# Patient Record
Sex: FEMALE | Race: WHITE | Employment: UNEMPLOYED | URBAN - METROPOLITAN AREA
[De-identification: names, ages, dates, MRNs, and addresses within clinical notes are randomized per-mention and may not be internally consistent; named-entity substitution may affect disease eponyms.]

---

## 2021-12-25 VITALS
HEART RATE: 96 BPM | WEIGHT: 293 LBS | TEMPERATURE: 97.9 F | DIASTOLIC BLOOD PRESSURE: 81 MMHG | SYSTOLIC BLOOD PRESSURE: 103 MMHG | HEIGHT: 62 IN | RESPIRATION RATE: 18 BRPM | BODY MASS INDEX: 53.92 KG/M2 | OXYGEN SATURATION: 96 %

## 2021-12-25 PROCEDURE — 99281 EMR DPT VST MAYX REQ PHY/QHP: CPT

## 2021-12-26 ENCOUNTER — HOSPITAL ENCOUNTER (OUTPATIENT)
Dept: GENERAL RADIOLOGY | Age: 41
Discharge: HOME | End: 2021-12-26
Attending: EMERGENCY MEDICINE

## 2021-12-26 ENCOUNTER — HOSPITAL ENCOUNTER (EMERGENCY)
Age: 41
Discharge: HOME | End: 2021-12-26
Attending: EMERGENCY MEDICINE
Payer: MEDICAID

## 2021-12-26 DIAGNOSIS — R29.6 RECURRENT FALLS: ICD-10-CM

## 2021-12-26 DIAGNOSIS — M25.561 ACUTE PAIN OF RIGHT KNEE: Primary | ICD-10-CM

## 2021-12-26 RX ORDER — TRAMADOL HYDROCHLORIDE 50 MG/1
100 TABLET ORAL
Status: DISCONTINUED | OUTPATIENT
Start: 2021-12-26 | End: 2021-12-26 | Stop reason: HOSPADM

## 2021-12-26 RX ORDER — LIDOCAINE 4 G/100G
1 PATCH TOPICAL
Status: DISCONTINUED | OUTPATIENT
Start: 2021-12-26 | End: 2021-12-26 | Stop reason: HOSPADM

## 2021-12-26 RX ORDER — TRAMADOL HYDROCHLORIDE 50 MG/1
50 TABLET ORAL
Qty: 20 TABLET | Refills: 0 | Status: SHIPPED | OUTPATIENT
Start: 2021-12-26 | End: 2021-12-29

## 2021-12-26 NOTE — ED PROVIDER NOTES
EMERGENCY DEPARTMENT HISTORY AND PHYSICAL EXAM     ------------------------------------------------------------------------------------------------------  Please note that this dictation was completed with M[employer], the [employer] voice recognition software. Quite often unanticipated grammatical, syntax, homophones, and other interpretive errors are inadvertently transcribed by the computer software. Please disregard these errors. Please excuse any errors that have escaped final proofreading.  -----------------------------------------------------------------------------------------------------------------    Date: 12/26/2021  Patient Name: Kyrie Perez    History of Presenting Illness     Chief Complaint   Patient presents with   Preston Fall     Patient Reports Right Knee Pain after a GLR from a Chair. Patient Denies Striking her Head or Use of Blood Thinners. History Provided By: Patient    HPI: Kyrie Perez is a 42 y.o. female, with significant pmhx of previous work-related injury to her left ankle status post repair at [facility], who presents via private vehicle to the ED with c/o right knee pain. Patient reports that she was at her senior mother's house utilizing her wheelchair when she slipped out of the wheelchair with subsequent pain in her right knee. Patient reports that she was attempting to get off the ground and that her knee would not cooperate with her. Patient is due also fell the other day at physical therapy attempting to reach her walker. Has not taking any medication to alleviate her symptoms. Denies having struck her head or loss of consciousness. Notes aching pain along the medial aspect of her right knee. Notes having similar pain in her knee with previous primary care visit suggesting arthritis and physical therapy. Has not followed with orthopedics for her knee pain.   Pt also specifically denies any recent fevers, chills, CP, SOB, nausea, vomiting, diarrhea, abd pain, changes in BM, urinary sxs, or headache. PCP: Titus Rosenberg MD    Social Hx: denies tobacco, denies EtOH, denies recreational/ Illicit Drugs     There are no other complaints, changes, or physical findings at this time. No Known Allergies          Past History     Past Medical History:  No past medical history on file. Past Surgical History:  No past surgical history on file. Family History:  No family history on file. Social History:  Social History     Tobacco Use    Smoking status: Not on file    Smokeless tobacco: Not on file   Substance Use Topics    Alcohol use: Not on file    Drug use: Not on file       Allergies:  No Known Allergies      Review of Systems   Review of Systems   Constitutional: Negative. Negative for fever. Eyes: Negative. Respiratory: Negative. Negative for shortness of breath. Cardiovascular: Negative for chest pain. Gastrointestinal: Negative for abdominal pain, nausea and vomiting. Endocrine: Negative. Genitourinary: Negative. Negative for difficulty urinating, dysuria and hematuria. Musculoskeletal: Positive for arthralgias. Skin: Negative. Neurological: Negative. Psychiatric/Behavioral: Negative for suicidal ideas. All other systems reviewed and are negative. Physical Exam   Physical Exam  Vitals and nursing note reviewed. Constitutional:       General: She is not in acute distress. Appearance: She is well-developed. She is morbidly obese. She is not diaphoretic. HENT:      Head: Normocephalic and atraumatic. Nose: Nose normal.   Eyes:      General: No scleral icterus. Conjunctiva/sclera: Conjunctivae normal.   Neck:      Trachea: No tracheal deviation. Cardiovascular:      Rate and Rhythm: Normal rate and regular rhythm. Heart sounds: Normal heart sounds. No murmur heard. No friction rub. Pulmonary:      Effort: Pulmonary effort is normal. No respiratory distress. Breath sounds: Normal breath sounds.  No stridor. No wheezing or rales. Abdominal:      General: Bowel sounds are normal. There is no distension. Palpations: Abdomen is soft. Tenderness: There is no abdominal tenderness. There is no rebound. Musculoskeletal:         General: Normal range of motion. Cervical back: Normal range of motion. Right knee: Tenderness present over the medial joint line. Legs:    Skin:     General: Skin is warm and dry. Findings: No rash. Neurological:      Mental Status: She is alert and oriented to person, place, and time. Cranial Nerves: No cranial nerve deficit. Psychiatric:         Speech: Speech normal.         Behavior: Behavior normal.         Thought Content: Thought content normal.         Judgment: Judgment normal.           Diagnostic Study Results     Labs -   No results found for this or any previous visit (from the past 12 hour(s)). Radiologic Studies -   XR KNEE RT 3 V   Final Result   No acute fracture or dislocation. CT Results  (Last 48 hours)    None        CXR Results  (Last 48 hours)    None            Medical Decision Making   I am the first provider for this patient. I reviewed the vital signs, available nursing notes, past medical history, past surgical history, family history and social history. Vital Signs-Reviewed the patient's vital signs. Patient Vitals for the past 12 hrs:   Temp Pulse Resp BP SpO2   12/25/21 2352 97.9 °F (36.6 °C) 96 18 103/81 96 %       Pulse Oximetry Analysis - 96% on RA Normal    Records Reviewed/Interpretted: Nursing Notes from triage and Old Medical Records, noting previous primary care and physical therapy visits    Provider Notes (Medical Decision Making):     DDX:  Contusion, fracture    Plan:  X-ray, analgesic    Impression:  Acute knee pain    ED Course:   Initial assessment performed.  The patients presenting problems have been discussed, and they are in agreement with the care plan formulated and outlined with them.  I have encouraged them to ask questions as they arise throughout their visit. I reviewed our electronic medical record system for any past medical records that were available that may contribute to the patients current condition, the nursing notes and and vital signs from today's visit  Nursing notes will be reviewed as they become available in realtime while the pt has been in the ED. Terra Downs MD    I personally reviewed/interpreted pt's imaging. Agree with official read by radiology as noted above. Terra Downs MD      2:39 AM  Progress note:  Pt noted to be feeling better, ready for discharge. Discussed lab and imaging findings with pt, specifically noting no evidence of fracture. Pt will follow up with primary care as instructed. All questions have been answered, pt voiced understanding and agreement with plan. Specific return precautions provided in addition to instructions for pt to return to the ED immediately should sx worsen at any time. Terra Downs MD             Critical Care Time:     none      Diagnosis     Clinical Impression:   1. Acute pain of right knee    2. Recurrent falls        PLAN:  1. Current Discharge Medication List      START taking these medications    Details   traMADoL (Ultram) 50 mg tablet Take 1 Tablet by mouth every six (6) hours as needed for Pain for up to 3 days. Max Daily Amount: 200 mg. Qty: 20 Tablet, Refills: 0  Start date: 12/26/2021, End date: 12/29/2021    Associated Diagnoses: Acute pain of right knee           2.    Follow-up Information     Follow up With Specialties Details Why Contact Info    Zoila Bird MD [facility] Medicine Schedule an appointment as soon as possible for a visit in 2 days  [facility] & [address]      Alexandr Sanz MD Orthopedic Surgery Schedule an appointment as soon as possible for a visit in 2 days  [address]  [address] [phone]  [address] [facility]  Schedule an appointment as soon as possible for a visit in 2 days  [address] [phone]  [phone]        Return to ED if worse     Disposition:    2:39 AM   The patient's results have been reviewed with family and/or caregiver. They verbally convey their understanding and agreement of the patient's signs, symptoms, diagnosis, treatment and prognosis and additionally agree to follow up as recommended in the discharge instructions or to return to the Emergency Room should the patient's condition change prior to their follow-up appointment. The family and/or caregiver verbally agrees with the care-plan and all of their questions have been answered. The discharge instructions have also been provided to the them with educational information regarding the patient's diagnosis as well a list of reasons why the patient would want to return to the ER prior to their follow-up appointment should their condition change.   North Salcedo MD

## 2022-01-25 ENCOUNTER — HOSPITAL ENCOUNTER (EMERGENCY)
Age: 42
Discharge: HOME | End: 2022-01-25
Attending: EMERGENCY MEDICINE
Payer: MEDICAID

## 2022-01-25 ENCOUNTER — APPOINTMENT (OUTPATIENT)
Dept: GENERAL RADIOLOGY | Age: 42
End: 2022-01-25
Attending: EMERGENCY MEDICINE
Payer: MEDICAID

## 2022-01-25 VITALS
BODY MASS INDEX: 51.74 KG/M2 | RESPIRATION RATE: 18 BRPM | TEMPERATURE: 98.9 F | OXYGEN SATURATION: 96 % | WEIGHT: 292 LBS | DIASTOLIC BLOOD PRESSURE: 64 MMHG | HEART RATE: 110 BPM | HEIGHT: 63 IN | SYSTOLIC BLOOD PRESSURE: 136 MMHG

## 2022-01-25 DIAGNOSIS — M25.561 CHRONIC PAIN OF RIGHT KNEE: Primary | ICD-10-CM

## 2022-01-25 DIAGNOSIS — G89.29 CHRONIC PAIN OF RIGHT KNEE: Primary | ICD-10-CM

## 2022-01-25 DIAGNOSIS — S80.01XA CONTUSION OF RIGHT KNEE, INITIAL ENCOUNTER: ICD-10-CM

## 2022-01-25 DIAGNOSIS — E66.01 MORBID OBESITY WITH BMI OF 50.0-59.9, ADULT (HCC): ICD-10-CM

## 2022-01-25 PROCEDURE — 73562 X-RAY EXAM OF KNEE 3: CPT

## 2022-01-25 PROCEDURE — 74011250637 HC RX REV CODE- 250/637: Performed by: EMERGENCY MEDICINE

## 2022-01-25 PROCEDURE — 99284 EMERGENCY DEPT VISIT MOD MDM: CPT

## 2022-01-25 RX ORDER — SULFAMETHOXAZOLE AND TRIMETHOPRIM 800; 160 MG/1; MG/1
1 TABLET ORAL 2 TIMES DAILY
COMMUNITY
End: 2022-01-25

## 2022-01-25 RX ORDER — IBUPROFEN 600 MG/1
600 TABLET ORAL
COMMUNITY
End: 2022-01-25 | Stop reason: SDUPTHER

## 2022-01-25 RX ORDER — OXYCODONE AND ACETAMINOPHEN 5; 325 MG/1; MG/1
2 TABLET ORAL
Status: COMPLETED | OUTPATIENT
Start: 2022-01-25 | End: 2022-01-25

## 2022-01-25 RX ORDER — IBUPROFEN 600 MG/1
600 TABLET ORAL
Qty: 20 TABLET | Refills: 0 | Status: SHIPPED | OUTPATIENT
Start: 2022-01-25 | End: 2022-02-13

## 2022-01-25 RX ADMIN — OXYCODONE HYDROCHLORIDE AND ACETAMINOPHEN 2 TABLET: 5; 325 TABLET ORAL at 17:15

## 2022-01-25 NOTE — PROGRESS NOTES
Transition of Care Plan:     * Disposition- Home with [place]  * Transportation at Discharge: [facility] with Ortho @ [place] 2/14/22  * DME needed: has walker at home     1600 CM receive consult patient lives alone at home patient took 2 to assist concern ADLs. 1620 CM reviewed chart. CM completed assessment with pt at bedside. Pt is alert and oriented throughout encounter. CM introduced self/role, verified demographics, and discussed discharge planning. Per patient her home phone does not work anymore. She is somewhat IADLS Her mother Linda Klein (73y/o) assist with something's. She walks around with a walker and gets in the shower with she can its difficult for her to get in the tub. She states \" I need help at home my mother can't lyft me and I can barely move around I just need a few days\" per patient she her PCP tried outpatient PT with [facility] she went for 2 days and she fell while there cause the pt staff had her walker far when she reach for it she fell she stated she was at [facility] for approximate 28 days did one hour of therapy and that was enough for her. Her goal with therapy is to get up walking and do things for herself cause her mother can't do it all. She has a sister she has not spoken to too since her car engine blew and patient has a boyfriend Juliet Lebron [phone]. CM discuss rules/regulations/admission criteria for medicaid for Inpatient Rehab, outpatient rehab, Home health, and SNF. Inform patient if she wants placement it will not take place today but facility are able to place from the community.      Discuss assisted living as her mother is getting of age and she needs help she states \" well I'm 42 and I don't want to go to a nursing home\" patient reference multiple times to having conversation with her  in reference to a prior injury and because of that patient neglects her health AEB: her knees has been hurting for the past several months close to a year she states \"I called my  and he said if I fell again then call the doctor, and I if can prove that my ankle that still has a screw in there is the reason for my arthritis and why my knees gave out then I can call my doctor and schedule an appointment\"     Discuss [facility], [facility] CM sent to [facility] agency via [phone] patient upset that she is not able to stay in the hospital and states \" I tried to explain to the doctor I have 4 stairs to get up to get in the house\" CM explain to patient the transportation agency will get patient in the home and will even place her wherever she like in the home, patient was okay with that.  discuss [facility] services coming to the home tomorrow and the SNF referral sent per her request.    1730 Cm called [facility] ETA 1830.   CM will follow-up with patient in the morning in reference to [facility] and SNF referral.     Jimenez Fuller CM

## 2022-01-25 NOTE — DISCHARGE INSTRUCTIONS
Follow up with outpatient physical therapy. You may benefit from short term physical rehabilitation, but you will also benefit from walking, weight loss, and taking extra strength ibuprofen for pain.

## 2022-01-25 NOTE — ED NOTES
Discharge instructions were given to the patient by Jason Askew  . The patient left the Emergency Department, alert and oriented and in no acute distress with 1 prescription. The patient was encouraged to call or return to the ED for worsening issues or problems and was encouraged to schedule a follow up appointment for continuing care. The patient verbalized understanding of discharge instructions and prescriptions, all questions were answered. The patient has no further concerns at this time.

## 2022-01-25 NOTE — ED NOTES
Pt was evaluated by MD. Pt reports she was trying to get up from the couch and fell in an awkward position. Pt noted to be making statements like \"I can't go home. \" And when MD mentioned that pt might not need to be admitted. Pt states \"I need to go somewhere that can help me. That is what medicare is for. \"

## 2022-01-25 NOTE — ED PROVIDER NOTES
EMERGENCY DEPARTMENT HISTORY AND PHYSICAL EXAM      Date: 1/25/2022  Patient Name: Constantin Escalante  Patient Age and Sex: 42 y.o. female  MRN:  [id number]  CSN:  [id number]    History of Presenting Illness     Chief Complaint   Patient presents with    Knee Pain       History Provided By: Patient    Ability to gather history was limited by:     HPI: Constantin Escalante, 42 y.o. female with history of morbid obesity, chronic knee pain, complains of pain in her right knee. Reports that about 30 to 60 minutes ago she fell off her sofa, landed on her right knee and in an awkward position on the floor, could not get herself back up. Her mother called EMS for assistance. She complains of 4 out of 10 severity throbbing pain in the right knee. No other injuries    Location:    Quality:      Severity:    Duration:   Timing:      Context:    Modifying factors:   Associated symptoms:     Past History      The patient's medical, surgical, and social history on file were reviewed by me today.  The family history was reviewed by me today and was non-contributory, unless otherwise specified below:    Past Medical History:  History reviewed. No pertinent past medical history. Past Surgical History:  History reviewed. No pertinent surgical history. Family History:  History reviewed. No pertinent family history. Social History:  Social History     Tobacco Use    Smoking status: Current Every Day Smoker     Packs/day: 0.25     Years: 22.00     Pack years: 5.50    Smokeless tobacco: Never Used   Vaping Use    Vaping Use: Never used   Substance Use Topics    Alcohol use: Yes    Drug use: Not Currently     Types: Marijuana     Comment: last dose was 9 years ago       Current Medications:  No current facility-administered medications on file prior to encounter.      Current Outpatient Medications on File Prior to Encounter   Medication Sig Dispense Refill    [DISCONTINUED] ibuprofen (MOTRIN) 600 mg tablet Take 600 mg by mouth every six (6) hours as needed for Pain.  [DISCONTINUED] trimethoprim-sulfamethoxazole (Bactrim DS) 160-800 mg per tablet Take 1 Tablet by mouth two (2) times a day. Allergies:  No Known Allergies  Review of Systems    A complete ROS was reviewed by me today and was negative, unless otherwise specified below:    Review of Systems   Neurological: Negative for syncope and headaches. All other systems reviewed and are negative. Physical Exam   Vital Signs  Patient Vitals for the past 8 hrs:   Temp Pulse Resp BP SpO2   01/25/22 1610     96 %   01/25/22 1552 98.9 °F (37.2 °C) (!) 110 18 136/64 96 %          Physical Exam  Vitals and nursing note reviewed. Constitutional:       General: She is not in acute distress. Appearance: Normal appearance. She is well-developed. She is obese. She is not ill-appearing. HENT:      Head: Normocephalic and atraumatic. Mouth/Throat:      Mouth: Mucous membranes are moist.   Eyes:      General:         Right eye: No discharge. Left eye: No discharge. Conjunctiva/sclera: Conjunctivae normal.   Cardiovascular:      Rate and Rhythm: Normal rate and regular rhythm. Heart sounds: Normal heart sounds. No murmur heard. Pulmonary:      Effort: Pulmonary effort is normal. No respiratory distress. Breath sounds: Normal breath sounds. No wheezing. Abdominal:      General: There is no distension. Palpations: Abdomen is soft. Tenderness: There is no abdominal tenderness. Musculoskeletal:         General: No deformity. Cervical back: Normal range of motion and neck supple. Right hip: Normal.      Right upper leg: Normal.      Right knee: No swelling, deformity, effusion, ecchymosis or bony tenderness. Decreased range of motion. Tenderness present. Normal alignment. Comments: Allodynia to light palpation over the soft tissues around the knee, yelling out in pain to very light palpation.   Otherwise completely normal examination. No swelling, no deformity, no effusion, no skin changes. Skin:     General: Skin is warm and dry. Findings: No bruising, ecchymosis or rash. Neurological:      General: No focal deficit present. Mental Status: She is alert and oriented to person, place, and time. Psychiatric:         Mood and Affect: Affect is angry. Speech: Speech normal.         Behavior: Behavior normal.         Cognition and Memory: Cognition normal.      Comments: Angry. Very unpleasant affect. Diagnostic Study Results   Labs  No results found for this or any previous visit (from the past 24 hour(s)). Radiologic Studies  XR KNEE RT 3 V   Final Result   Limited radiographic examination of the right knee. No definite   abnormalities detected. No definite evidence of acute traumatic injury. CT Results  (Last 48 hours)    None        CXR Results  (Last 48 hours)    None          Billable Procedures   Procedures    Medical Decision Making     I reviewed the patient's most recent Emergency Dept notes and diagnostic tests in formulating my MDM on today's visit. Provider Notes (Medical Decision Making):   79-year-old female complaining of right knee pain after she fell off of her sofa an hour ago. Pain is acute on chronic. She has been seen in the emergency department before for pain in the same knee. Reassuring vital signs and physical examination. Morbidly obese. The knee appears normal, without any significant deformity or swelling. She screams out in pain to the lightest of touch over the soft tissues around the knee. The exam is not suggestive of acute fracture or ligamentous injury. Xrays negative for fracture, consistent with my exam.  Recommend ibuprofen and outpatient PT/OT consult, which I arranged with case management. She can continue using her walker for now. Encouraged weight loss.     Mervin Ding MD  4:11 PM  1/25/2022 Consults:    Social History     Tobacco Use    Smoking status: Current Every Day Smoker     Packs/day: 0.25     Years: 22.00     Pack years: 5.50    Smokeless tobacco: Never Used   Vaping Use    Vaping Use: Never used   Substance Use Topics    Alcohol use: Yes    Drug use: Not Currently     Types: Marijuana     Comment: last dose was 9 years ago       Medications Administered during ED course:  Medications   oxyCODONE-acetaminophen (PERCOCET) 5-325 mg per tablet 2 Tablet (2 Tablets Oral Given 1/25/22 1715)          Prescriptions from today's ED visit:  Current Discharge Medication List      CONTINUE these medications which have CHANGED    Details   ibuprofen (MOTRIN) 600 mg tablet Take 1 Tablet by mouth every six (6) hours as needed for Pain. Qty: 20 Tablet, Refills: 0  Start date: 1/25/2022            Diagnosis and Disposition     Disposition:  Discharged    Clinical Impression:   1. Chronic pain of right knee    2. Contusion of right knee, initial encounter    3. Morbid obesity with BMI of 50.0-59.9, adult [facility])        Attestation:  I personally performed the services described in this documentation on this date 1/25/2022 for patient Farrah Lima MD        I was the first provider for this patient on this visit. To the best of my ability I reviewed relevant prior medical records, electrocardiograms, laboratories, and radiologic studies. The patient's presenting problems were discussed, and the patient was in agreement with the care plan formulated and outlined with them. Shar Curry MD    Please note that this dictation was completed with Dragon voice recognition software. Quite often unanticipated grammatical, syntax, homophones, and other interpretive errors are inadvertently transcribed by the computer software. Please disregard these errors and excuse any errors that have escaped final proofreading.

## 2022-01-26 NOTE — ED NOTES
Pt presents to ED via EMS complaining of 5/10 right knee pain. Pt fell off of couch and was found face down by EMS. Pt has history of arthritis. Pt is alert and oriented x 4, RR even and unlabored, skin is warm and dry. Assessment completed and pt updated on plan of care. Call bell in reach. Emergency Department Nursing Plan of Care       The Nursing Plan of Care is developed from the Nursing assessment and Emergency Department Attending provider initial evaluation. The plan of care may be reviewed in the ED Provider note.     The Plan of Care was developed with the following considerations:   Patient / Family readiness to learn indicated by:verbalized understanding  Persons(s) to be included in education: patient  Barriers to Learning/Limitations:No    Signed     Mahsa Vanegas    1/25/2022   7:26 PM

## 2022-01-26 NOTE — ED NOTES
This RN assumes role as preceptor to Rashawn Anderson, Student Nurse. This RN reviews all assessments, charting, medication administration, and skills performed by the preceptee.

## 2022-01-28 ENCOUNTER — TELEPHONE (OUTPATIENT)
Dept: CASE MANAGEMENT | Age: 42
End: 2022-01-28

## 2022-01-28 ENCOUNTER — HOSPITAL ENCOUNTER (INPATIENT)
Age: 42
LOS: 13 days | Discharge: NURSING, REHAB OR LONG-TERM CARE | DRG: 321 | End: 2022-02-13
Attending: EMERGENCY MEDICINE | Admitting: INTERNAL MEDICINE
Payer: MEDICAID

## 2022-01-28 ENCOUNTER — APPOINTMENT (OUTPATIENT)
Dept: GENERAL RADIOLOGY | Age: 42
DRG: 321 | End: 2022-01-28
Attending: EMERGENCY MEDICINE
Payer: MEDICAID

## 2022-01-28 DIAGNOSIS — R26.2 INABILITY TO WALK: ICD-10-CM

## 2022-01-28 DIAGNOSIS — Z98.1 S/P CERVICAL SPINAL FUSION: ICD-10-CM

## 2022-01-28 DIAGNOSIS — R29.6 RECURRENT FALLS: ICD-10-CM

## 2022-01-28 DIAGNOSIS — E66.01 MORBID OBESITY (HCC): ICD-10-CM
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
## 2022-01-28 DIAGNOSIS — Z72.0 TOBACCO ABUSE: ICD-10-CM

## 2022-01-28 DIAGNOSIS — M54.12 CERVICAL RADICULOPATHY: ICD-10-CM

## 2022-01-28 DIAGNOSIS — R26.2 AMBULATORY DYSFUNCTION: ICD-10-CM

## 2022-01-28 DIAGNOSIS — M25.561 ACUTE PAIN OF RIGHT KNEE: ICD-10-CM

## 2022-01-28 PROCEDURE — 99285 EMERGENCY DEPT VISIT HI MDM: CPT

## 2022-01-28 PROCEDURE — 73562 X-RAY EXAM OF KNEE 3: CPT

## 2022-01-28 RX ORDER — KETOROLAC TROMETHAMINE 30 MG/ML
30 INJECTION, SOLUTION INTRAMUSCULAR; INTRAVENOUS
Status: COMPLETED | OUTPATIENT
Start: 2022-01-28 | End: 2022-01-29

## 2022-01-28 RX ORDER — OXYCODONE AND ACETAMINOPHEN 5; 325 MG/1; MG/1
1 TABLET ORAL
Status: COMPLETED | OUTPATIENT
Start: 2022-01-28 | End: 2022-01-29

## 2022-01-28 NOTE — TELEPHONE ENCOUNTER
Late entry for 1/27/2022     Update on Home health services CM sent referral to [facility] home health pending referral.    Mayela Clancy CM    Recipient First Sent On Respond By First Response Received Last Response Received Last [address] [facility])  [phone]    * COVID-19: Positive patients under care,* COVID-19: Willing/Equipped to accept COVID-19 positive patients,* High-Risk Isolation Patients: Willing/Equipped to accept High-Risk Isolation Patients 1/25/2022 5:44 PM (ET)  1/26/2022 4:12 PM (ET)  1/25/2022 11:41 PM (ET)  1/25/2022 11:41 PM (ET)  Response: No, unable to accept patient  Reason: Insufficient Funding  Comments: We do not participate with patient's insurance  Waiting for you    [facility]  [phone]  none  * COVID-19: Positive patients under care,* COVID-19: Willing/Equipped to accept COVID-19 positive patients,* High-Risk Isolation Patients: Willing/Equipped to accept High-Risk Isolation Patients 1/25/2022 5:44 PM (ET)  1/26/2022 4:12 PM (ET)  1/25/2022 5:47 PM (ET)  1/26/2022 3:16 PM (ET)  Response: No, unable to accept patient  Reason: Other (see comments)  Comments: This payer not accepted  Waiting for you    [place] [facility]  [phone]    * COVID-19: Willing/Equipped to accept COVID-19 positive patients,* High-Risk Isolation Patients: Willing/Equipped to accept High-Risk Isolation Patients 1/25/2022 5:44 PM (ET)  1/26/2022 9:43 AM (ET)  1/25/2022 5:46 PM (ET)  1/25/2022 5:46 PM (ET)  Response: No, unable to accept patient  Reason: Other (see comments)  Comments: Thank you for your confidence in trusting us with your patient. We are unable to service this patient at this time. Please contact us with your next referral as things change quickly and we want to ensure we provide the highest clinical excellence for all of our patients.   Waiting for you    FABIO OWEN [facility]  [phone]    * COVID-19: Positive patients under care,* COVID-19: Willing/Equipped to accept COVID-19 positive patients,* High-Risk Isolation Patients: Willing/Equipped to accept High-Risk Isolation Patients 1/25/2022 5:44 PM (ET)  1/26/2022 4:12 PM (ET)  1/25/2022 6:07 PM (ET)  1/26/2022 3:15 PM (ET)  Response: No, unable to accept patient  Reason: Out of Network  Comments: .   Waiting for you    [address] [place] [address] [employer]  [phone]: Positive patients under care,* COVID-19: Willing/Equipped to accept COVID-19 positive patients,* High-Risk Isolation Patients: Willing/Equipped to accept High-Risk Isolation Patients 1/26/2022 3:13 PM (ET)  1/26/2022 4:12 PM (ET)  1/26/2022 3:15 PM (ET)  1/26/2022 3:15 PM (ET)  Response: No, unable to accept patient  Reason: Out of Network  Comments: Unable to accept due to patients insurance being out of network with agency  Waiting for you    [facility]  [phone]: Positive patients under care,* COVID-19: Willing/Equipped to accept COVID-19 positive patients,* High-Risk Isolation Patients: Willing/Equipped to accept High-Risk Isolation Patients -  -  -  -  -      AT [address] [place] [phone]: Positive patients under care,* COVID-19: Willing/Equipped to accept COVID-19 positive patients,* High-Risk Isolation Patients: Willing/Equipped to accept High-Risk Isolation Patients -  -  -  -  -      [address]  [phone]: Services not specified 1/25/2022 5:44 PM (ET)  1/26/2022 4:12 PM (ET)  -  -  -  Waiting for recipient    Response Notes  [address]   [phone]  -  -  -  -  -      [facility] [address])  [phone]: Willing/Equipped to accept COVID-19 positive patients -  -  -  -  -      [facility]  [phone]    * COVID-19: Positive patients under care,* COVID-19: Willing/Equipped to accept COVID-19 positive patients,* High-Risk Isolation Patients: Willing/Equipped to accept High-Risk Isolation Patients 1/25/2022 5:44 PM (ET)  1/26/2022 4:12 PM (ET)  -  -  -  Waiting for recipient    Response Notes  [facility]  [phone]: NOT able to accept COVID-19 positive patients -  -  -  -  -      [facility]  [phone]: Willing/Equipped to accept COVID-19 positive patients -  -  -  -  -      [facility] - ([address])  [phone]: Positive patients under care,* COVID-19: Willing/Equipped to accept COVID-19 positive patients,* High-Risk Isolation Patients: Willing/Equipped to accept High-Risk Isolation Patients -  -  -  -  -      [address] [place] [address].  [phone]  1/26/2022 3:13 PM (ET)  1/26/2022 4:12 PM (ET)  -  -  -  Waiting for recipient

## 2022-01-28 NOTE — TELEPHONE ENCOUNTER
CM receive call from [facility] with EMS/FIRE [facility] department states receive call from patient needed clarification on her discharge plan as patient was not clear. Per [facility] EMS/FIRE workers were at the home ans states the home is not safe for patient or mother the home is a hoarding condition pile of items waist tall and barely any place to move. Explain patient did not indicate the home was in such condition we discuss her ADLs and she only stated she has some problems getting in the tub to take a shower due to fall risk. Inform of [facility] referrals and SNF referrals inform several messages for care coordinator with Medicaid which patient was aware off. Per [facility] she inform patient if she calls EMS ask to go to [address] [place] [address] and she stated she will contact APS/CRISIS unit for additional assistance. Discuss if patient does not meet criteria to be admitted patient will have to be discharge this writer unable to approve social admits will inform Siria Lowell Davis.      John Hurst CM
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
## 2022-01-29 LAB
ANION GAP SERPL CALC-SCNC: 6 MMOL/L (ref 5–15)
BASOPHILS # BLD: 0 K/UL (ref 0–0.1)
BASOPHILS NFR BLD: 0 % (ref 0–1)
BUN SERPL-MCNC: 17 MG/DL (ref 6–20)
BUN/CREAT SERPL: 17 (ref 12–20)
CALCIUM SERPL-MCNC: 8.5 MG/DL (ref 8.5–10.1)
CHLORIDE SERPL-SCNC: 107 MMOL/L (ref 97–108)
CK SERPL-CCNC: 200 U/L (ref 26–192)
CO2 SERPL-SCNC: 24 MMOL/L (ref 21–32)
CREAT SERPL-MCNC: 1 MG/DL (ref 0.55–1.02)
DIFFERENTIAL METHOD BLD: ABNORMAL
EOSINOPHIL # BLD: 0.2 K/UL (ref 0–0.4)
EOSINOPHIL NFR BLD: 2 % (ref 0–7)
ERYTHROCYTE [DISTWIDTH] IN BLOOD BY AUTOMATED COUNT: 14.6 % (ref 11.5–14.5)
GLUCOSE SERPL-MCNC: 91 MG/DL (ref 65–100)
HCT VFR BLD AUTO: 41.4 % (ref 35–47)
HGB BLD-MCNC: 13.6 G/DL (ref 11.5–16)
IMM GRANULOCYTES # BLD AUTO: 0.1 K/UL (ref 0–0.04)
IMM GRANULOCYTES NFR BLD AUTO: 1 % (ref 0–0.5)
LYMPHOCYTES # BLD: 2 K/UL (ref 0.8–3.5)
LYMPHOCYTES NFR BLD: 24 % (ref 12–49)
MCH RBC QN AUTO: 37.3 PG (ref 26–34)
MCHC RBC AUTO-ENTMCNC: 32.9 G/DL (ref 30–36.5)
MCV RBC AUTO: 113.4 FL (ref 80–99)
MONOCYTES # BLD: 0.3 K/UL (ref 0–1)
MONOCYTES NFR BLD: 4 % (ref 5–13)
NEUTS SEG # BLD: 5.7 K/UL (ref 1.8–8)
NEUTS SEG NFR BLD: 69 % (ref 32–75)
NRBC # BLD: 0 K/UL (ref 0–0.01)
NRBC BLD-RTO: 0 PER 100 WBC
PLATELET # BLD AUTO: 237 K/UL (ref 150–400)
PMV BLD AUTO: 10.2 FL (ref 8.9–12.9)
POTASSIUM SERPL-SCNC: 4.4 MMOL/L (ref 3.5–5.1)
RBC # BLD AUTO: 3.65 M/UL (ref 3.8–5.2)
RBC MORPH BLD: ABNORMAL
SODIUM SERPL-SCNC: 137 MMOL/L (ref 136–145)
WBC # BLD AUTO: 8.3 K/UL (ref 3.6–11)

## 2022-01-29 PROCEDURE — 97530 THERAPEUTIC ACTIVITIES: CPT | Performed by: OCCUPATIONAL THERAPIST

## 2022-01-29 PROCEDURE — G0378 HOSPITAL OBSERVATION PER HR: HCPCS

## 2022-01-29 PROCEDURE — 85025 COMPLETE CBC W/AUTO DIFF WBC: CPT

## 2022-01-29 PROCEDURE — 97165 OT EVAL LOW COMPLEX 30 MIN: CPT | Performed by: OCCUPATIONAL THERAPIST

## 2022-01-29 PROCEDURE — 82550 ASSAY OF CK (CPK): CPT

## 2022-01-29 PROCEDURE — 74011250636 HC RX REV CODE- 250/636: Performed by: INTERNAL MEDICINE

## 2022-01-29 PROCEDURE — 74011250637 HC RX REV CODE- 250/637: Performed by: EMERGENCY MEDICINE

## 2022-01-29 PROCEDURE — 96372 THER/PROPH/DIAG INJ SC/IM: CPT

## 2022-01-29 PROCEDURE — 74011250636 HC RX REV CODE- 250/636: Performed by: EMERGENCY MEDICINE

## 2022-01-29 PROCEDURE — 97535 SELF CARE MNGMENT TRAINING: CPT | Performed by: OCCUPATIONAL THERAPIST

## 2022-01-29 PROCEDURE — 36415 COLL VENOUS BLD VENIPUNCTURE: CPT

## 2022-01-29 PROCEDURE — 80048 BASIC METABOLIC PNL TOTAL CA: CPT

## 2022-01-29 PROCEDURE — 97162 PT EVAL MOD COMPLEX 30 MIN: CPT

## 2022-01-29 PROCEDURE — 97530 THERAPEUTIC ACTIVITIES: CPT

## 2022-01-29 RX ORDER — ACETAMINOPHEN 325 MG/1
650 TABLET ORAL
Status: DISCONTINUED | OUTPATIENT
Start: 2022-01-29 | End: 2022-02-13 | Stop reason: HOSPADM

## 2022-01-29 RX ORDER — IBUPROFEN 200 MG
1 TABLET ORAL DAILY
Status: DISCONTINUED | OUTPATIENT
Start: 2022-01-29 | End: 2022-02-13 | Stop reason: HOSPADM

## 2022-01-29 RX ORDER — ONDANSETRON 2 MG/ML
4 INJECTION INTRAMUSCULAR; INTRAVENOUS
Status: DISCONTINUED | OUTPATIENT
Start: 2022-01-29 | End: 2022-02-13 | Stop reason: HOSPADM

## 2022-01-29 RX ORDER — SODIUM CHLORIDE 0.9 % (FLUSH) 0.9 %
5-40 SYRINGE (ML) INJECTION AS NEEDED
Status: DISCONTINUED | OUTPATIENT
Start: 2022-01-29 | End: 2022-02-04 | Stop reason: SDUPTHER

## 2022-01-29 RX ORDER — ENOXAPARIN SODIUM 100 MG/ML
40 INJECTION SUBCUTANEOUS DAILY
Status: DISCONTINUED | OUTPATIENT
Start: 2022-01-29 | End: 2022-02-04

## 2022-01-29 RX ORDER — OXYCODONE HYDROCHLORIDE 5 MG/1
5 TABLET ORAL
Status: DISCONTINUED | OUTPATIENT
Start: 2022-01-29 | End: 2022-02-13 | Stop reason: HOSPADM

## 2022-01-29 RX ORDER — POLYETHYLENE GLYCOL 3350 17 G/17G
17 POWDER, FOR SOLUTION ORAL DAILY PRN
Status: DISCONTINUED | OUTPATIENT
Start: 2022-01-29 | End: 2022-02-04

## 2022-01-29 RX ORDER — ACETAMINOPHEN 650 MG/1
650 SUPPOSITORY RECTAL
Status: DISCONTINUED | OUTPATIENT
Start: 2022-01-29 | End: 2022-02-13 | Stop reason: HOSPADM

## 2022-01-29 RX ORDER — IBUPROFEN 600 MG/1
600 TABLET ORAL
Status: DISCONTINUED | OUTPATIENT
Start: 2022-01-29 | End: 2022-01-30

## 2022-01-29 RX ORDER — ACETAMINOPHEN 325 MG/1
650 TABLET ORAL
Status: DISCONTINUED | OUTPATIENT
Start: 2022-01-29 | End: 2022-02-03

## 2022-01-29 RX ORDER — SODIUM CHLORIDE 0.9 % (FLUSH) 0.9 %
5-40 SYRINGE (ML) INJECTION EVERY 8 HOURS
Status: DISCONTINUED | OUTPATIENT
Start: 2022-01-29 | End: 2022-02-04 | Stop reason: SDUPTHER

## 2022-01-29 RX ORDER — ONDANSETRON 4 MG/1
4 TABLET, ORALLY DISINTEGRATING ORAL
Status: DISCONTINUED | OUTPATIENT
Start: 2022-01-29 | End: 2022-02-13 | Stop reason: HOSPADM

## 2022-01-29 RX ADMIN — ENOXAPARIN SODIUM 40 MG: 100 INJECTION SUBCUTANEOUS at 11:56

## 2022-01-29 RX ADMIN — OXYCODONE AND ACETAMINOPHEN 1 TABLET: 5; 325 TABLET ORAL at 00:23

## 2022-01-29 RX ADMIN — KETOROLAC TROMETHAMINE 30 MG: 30 INJECTION, SOLUTION INTRAMUSCULAR; INTRAVENOUS at 00:23

## 2022-01-29 NOTE — PROGRESS NOTES
End of Shift Note    Bedside shift change report given to [facility] (oncoming nurse) by Pippa Baumann RN (offgoing nurse). Report included the following information SBAR, Kardex, ED Summary, Procedure Summary, Intake/Output and MAR    Shift worked:  [id number]     Shift summary and any significant changes:     Pt arrived on unit from ED this am. Pt had no complaints of pain. Pt stated she was unable to walk, had no control of her bladder and was unable to care for herself and just wanted us to help get her into some kind of care facility. Safety rounding and toileting needs met.       Concerns for physician to address:       Zone phone for oncoming shift:            Pippa Bamuann RN

## 2022-01-29 NOTE — ED PROVIDER NOTES
EMERGENCY DEPARTMENT HISTORY AND PHYSICAL EXAM          Date: 1/28/2022  Patient Name: Simona Garcia  Attending of Record: Janeth Jaylon     History of Presenting Illness     Chief Complaint   Patient presents with    Knee Pain     Patient arrives via EMS from home after her right knee \"gave out\" and the patient fell. Patient is unable to take care of herself and needs long term care admission. History Provided By: Patient and EMS    HPI: Simona Garcia is a 42 y.o. female with no reported PMH who presents today with complaints of right knee pain. Pt states that she has had multiple falls because her legs give out. She fell a few days ago on her knee and now has right knee pain. She was evaluated at that time and was told nothing was wrong with her knee. She fell again today and called EMS because she was not able to get up. She states that she is unable to take care of herself and is unable to ambulate. She lives at home with her mother. She is requesting help with getting admitted to a nursing facility. She denies any SOB, chest pain, or abdominal pain. Pt denies any other exacerbating or ameliorating factors. Additionally, pt specifically denies any recent fever, chills, headache, nausea, vomiting, abdominal pain, CP, SOB, lightheadedness, dizziness, numbness, weakness, lower extremity swelling, heart palpitations, urinary sxs, diarrhea, constipation, melena, hematochezia, cough, or congestion. PCP: Armen Loco MD    There are no other complaints, changes, or physical findings at this time.      Current Facility-Administered Medications   Medication Dose Route Frequency Provider Last Rate Last Admin    ibuprofen (MOTRIN) tablet 600 mg  600 mg Oral Q6H PRN Alfredo Wynn MD        sodium chloride (NS) flush 5-40 mL  5-40 mL IntraVENous Q8H Alfredo Wynn MD        sodium chloride (NS) flush 5-40 mL  5-40 mL IntraVENous PRN Alfredo Wynn MD        acetaminophen (TYLENOL) tablet 650 mg  650 mg Oral Q6H PRN Harjeet Colvin MD        Or    acetaminophen (TYLENOL) suppository 650 mg  650 mg Rectal Q6H PRN Harjeet Colvin MD        polyethylene glycol (MIRALAX) packet 17 g  17 g Oral DAILY PRN Harjeet Colvin MD        ondansetron (ZOFRAN ODT) tablet 4 mg  4 mg Oral Q8H PRN Harjeet Colvin MD        Or    ondansetron [facility]) injection 4 mg  4 mg IntraVENous Q6H PRN Harjeet Colvin MD        enoxaparin (LOVENOX) injection 40 mg  40 mg SubCUTAneous DAILY Harjeet Colvin MD   40 mg at 01/29/22 1156    acetaminophen (TYLENOL) tablet 650 mg  650 mg Oral Q6H PRN Deanna Kurtz MD        oxyCODONE IR (ROXICODONE) tablet 5 mg  5 mg Oral Q4H PRN Harjeet Colvin MD   5 mg at 01/30/22 0431    nicotine (NICODERM CQ) 14 mg/24 hr patch 1 Patch  1 Patch TransDERmal DAILY Harjeet Colvin MD           Past History     Past Medical History:  History reviewed. No pertinent past medical history. Past Surgical History:  Past Surgical History:   Procedure Laterality Date    HX ANKLE FRACTURE TX         Family History:  History reviewed. No pertinent family history. Social History:  Social History     Tobacco Use    Smoking status: Current Every Day Smoker     Packs/day: 0.25     Years: 22.00     Pack years: 5.50    Smokeless tobacco: Never Used   Vaping Use    Vaping Use: Never used   Substance Use Topics    Alcohol use: Yes    Drug use: Not Currently     Types: Marijuana     Comment: last dose was 9 years ago       Allergies:  No Known Allergies      Review of Systems   Review of Systems   Constitutional: Negative for chills and fever. HENT: Negative for sore throat. Respiratory: Negative for cough and shortness of breath. Cardiovascular: Negative for chest pain. Gastrointestinal: Negative for abdominal pain, nausea and vomiting. Genitourinary: Negative for dysuria, frequency and urgency. Musculoskeletal: Negative for back pain.         Right knee pain   Skin: Negative for rash.   Neurological: Negative for headaches. Falls     Psychiatric/Behavioral: Negative for confusion. Physical Exam   Physical Exam  Constitutional:       General: She is not in acute distress. Appearance: She is obese. Comments: Appears disheveled, only wearing a shirt   HENT:      Head: Normocephalic and atraumatic. Mouth/Throat:      Mouth: Mucous membranes are moist.   Eyes:      Pupils: Pupils are equal, round, and reactive to light. Cardiovascular:      Rate and Rhythm: Normal rate and regular rhythm. Pulses: Normal pulses. Heart sounds: Normal heart sounds. Pulmonary:      Effort: Pulmonary effort is normal.      Breath sounds: Normal breath sounds. Abdominal:      General: Abdomen is flat. Bowel sounds are normal.      Palpations: Abdomen is soft. Musculoskeletal:         General: Normal range of motion. Cervical back: Normal range of motion. Comments: Right knee tender to palpation, ecchymosis over knee   Skin:     General: Skin is warm and dry. Neurological:      General: No focal deficit present. Mental Status: She is alert. Psychiatric:         Mood and Affect: Mood normal.         Diagnostic Study Results     Labs -       Fabio Koyanagi #[id number] ([de-identified]) (15 y.o.  F) PCP: Ever Duran ([phone])  01    Lab Results           Contains abnormal data CBC WITH AUTOMATED DIFF (Final result)   Component (Lab Inquiry)  Collection Time Result Time WBC RBC HGB HCT MCV MCH MCHC RDW PLT MPLV   01/29/22 00:45:00 01/29/22 02:06:56 8.3 3.65 Low  13.6 41.4 113.4 High  37.3 High  32.9 14.6 High  237 10.2       Collection Time Result Time NRBC ANRBC GRANS LYMPH MONOS EOS BASOS IG ABG ABL   01/29/22 00:45:00 01/29/22 02:06:56 0.0 0.00 69 24 4 Low  2 0 1 High  5.7 2.0       Collection Time Result Time ABM CLIF ABB AIG DF RCOM   01/29/22 00:45:00 01/29/22 02:06:56 0.3 0.2 0.0 0.1 High  SMEAR SCANNED MACROCYTOSIS   1+          Final result           METABOLIC PANEL, BASIC (Final result)   Component (Lab Inquiry)  Collection Time Result Time NA K CL CO2 AGAP GLU BUN CREA BUCR GFRAA   01/29/22 00:45:00 01/29/22 02:07:52 137 4.4   SPECIMEN HEMOLYZED, RESULTS MAY BE AFFECTED 107 24 6 91 17 1.00 17 >60       Collection Time Result Time GFRNA CA   01/29/22 00:45:00 01/29/22 02:07:52 >60   Estimated GFR is calcu. .. 8.5         Final result                           Contains abnormal data CK (Final result)   Component (Lab Inquiry)  Collection Time Result Time CPK   01/29/22 00:45:00 01/29/22 02:07:52 200   SPECIMEN HEMOLYZED, RESULTS MAY BE AFFECTED High          Final result                      ECG Results    None    PACS Images     Show images for XR KNEE RT 3 V    PACS Images     Show images for MRI CERV SPINE W WO CONT    PACS Images     Show images for MRI LUMB SPINE W WO CONT    PACS Images     Show images for XR CHEST PORT    Imaging Results          XR KNEE RT 3 V (Final result)  Result time 01/28/22 23:15:12  Final result by Giacomo Arteaga MD (01/28/22 23:15:12)                Impression:    No acute abnormality. Narrative:    EXAM: XR KNEE RT 3 V     INDICATION: Right knee pain. COMPARISON: 1/25/2022. FINDINGS: Three views of the right knee demonstrate no acute fracture or bony   erosion. The joint spaces are maintained. There is no joint effusion.                   Peak Flow Results    None   Go to Results Review             Radiologic Studies -   XR KNEE RT 3 V   Final Result   No acute abnormality. CT Results  (Last 48 hours)    None        CXR Results  (Last 48 hours)    None            Medical Decision Making   I am the first provider for this patient. I reviewed the vital signs, available nursing notes, past medical history, past surgical history, family history and social history. Vital Signs-Reviewed the patient's vital signs.   Patient Vitals for the past 12 hrs:   Temp Pulse Resp BP SpO2 01/29/22 2358 97.9 °F (36.6 °C) 89 20 (!) 112/52 92 %   01/29/22 1959 98 °F (36.7 °C) 95 20 137/77 95 %     Pulse Oximetry Analysis: 95% on RA    Cardiac Monitor:   Rate: 89 bpm  The cardiac monitor revealed the following rhythm as interpreted by me: Normal Sinus Rhythm  Cardiac monitoring was ordered to monitor patient for signs of cardiac dysrhythmia, which they are at risk for based on their history and/or risk for cardiovascular disease and/or metabolic abnormalities. Records Reviewed: Nursing Notes and Old Medical Records    Provider Notes (Medical Decision Making):   Pt presents today with complaint of right knee pain, multiple falls, and requesting placement into a facility. Pt is hemodynamically stable is morbidly obese with pain to palpation of her knee. Pulses are normal and she is neurologically intact. Will get xray, treat patient's pain, and ambulate. ED Course and Progress Notes:   Initial assessment performed. The patients presenting problems have been discussed, and they are in agreement with the care plan formulated and outlined with them. I have encouraged them to ask questions as they arise throughout their visit. ED Course as of 01/30/22 0647   Fri Jan 28, 2022   2324 XR KNEE RT 3 V  Normal xray [ND]      ED Course User Index  [ND] Tabby Beach MD     TOBACCO COUNSELING:  Upon evaluation, pt expressed that they are a current tobacco user. For approximately 3-5 mins, pt has been counseled on the dangers of smoking and was encouraged to quit as soon as possible in order to decrease further risks to their health. Pt has conveyed their understanding of the risks involved should they continue to use tobacco products. Progress Note:  Pt with noted recurrent falls, ambulatory dysfunction, fall precautions initiated, will likely need admission with PT/OT evaluations and possible rehab placement. Progress Note:  I have just re-evaluated the patient.  Patient reports improvement of sx's.  I have reviewed His vital signs and determined there is currently no worsening in their condition or physical exam. Results have been reviewed with them and their questions have been answered. We will continue to review further results as they come available. Consult Note:  Ally Arora MD spoke with Dr. Alvan Lanes  Specialty: Hospitalist  Discussed pt's hx, disposition, and available diagnostic and imaging results. Reviewed care plans. Agree with management and plan thus far. Consultant will evaluate pt. ADMIT  Given the patient's current clinical presentation, I have a high level of concern for decompensation if discharged from the emergency department. Patient is being admitted to the hospital.  The results of their tests and reasons for their admission have been discussed with them and/or available family. They convey agreement and understanding for the need to be admitted and for their admission diagnosis. Consultation has been made with the inpatient physician specialist for hospitalization. Diagnosis     Clinical Impression:   1. Fall from ground level    2. Acute pain of right knee    3. Inability to walk    4. Recurrent falls    5. Ambulatory dysfunction    6. Morbid obesity ([address]    7. Tobacco abuse        Disposition:  Admitted to hospital     I personally performed the services described in this documentation on this date 1/28/2022 for Zaki Infante. I have reviewed and verified that all the information is accurate and complete.  Ally Arora MD

## 2022-01-29 NOTE — ED NOTES
Pt voided in the bed, states she is incontinent, advised pt to use the call bell provided and call for assist, bedding changed, large chux pad placed and pure wick; area to left hip/buttock circular skin breakdown size of 50cent piece, pt states she is unable to care for herself, hands are filthy with visible dirt;

## 2022-01-29 NOTE — ED NOTES
Patient is being transferred to \[facility]\"" Medical Oncology, Room # 6604. Report given to Marlena Verduzco RN on [address], [facility] JOSEPHINE Weeks for routine progression of care. Report consisted of the following information SBAR, ED Summary, Procedure Summary, Intake/Output, MAR and Recent Results. Patient transferred to receiving unit by: ed tech (RN or tech name). Outstanding consults needed: Yes    Next labs due: No     The following personal items will be sent with the patient during transfer to the floor: All valuables:    Cardiac monitoring ordered: No     The following CURRENT information was reported to the receiving RN:    Code status: Full Code at time of transfer    Last set of vital signs:  Vital Signs  Level of Consciousness: Alert (0) (01/29/22 0335)  Temp: 98.1 °F (36.7 °C) (01/29/22 0335)  Temp Source: Oral (01/29/22 0335)  Pulse (Heart Rate): 95 (01/29/22 0335)  Heart Rate Source: Monitor (01/29/22 0335)  Resp Rate: 18 (01/29/22 0335)  BP: 126/88 (01/29/22 0335)  MAP (Calculated): 101 (01/29/22 0335)  BP 1 Location: Right lower arm (01/29/22 0335)  BP 1 Method: Automatic (01/29/22 0335)  BP Patient Position: At rest (01/29/22 0335)  MEWS Score: 1 (01/29/22 0335)         Oxygen Therapy  O2 Sat (%): 95 % (01/29/22 0335)  O2 Device: None (01/29/22 0335)      Last pain assessment:  Pain 1  Pain Scale 1: Numeric (0 - 10)  Pain Intensity 1: 10  Patient Stated Pain Goal: 0      Wounds: No pt has areas of skin breakdown left hip/buttock area    Urinary catheter: incontinent using pure wick  Is there a trujillo order: No     LDAs:       Peripheral IV 01/29/22 Right Antecubital (Active)         Opportunity for questions and clarification was provided.     Chanel Jaimes RN

## 2022-01-29 NOTE — PROGRESS NOTES
Pt seen and examined briefly today AM on my rounds. Pt just admitted on Observation bed overnight/after MN for recurrent falls due to R knee giving away.   Labs ok  Imaging ok  Awaiting Ortho eval for any further recommendations  IP PT/OT eval for DC planning  IP CM consulted for DC planning in 24-48 hrs        DO NOT BILL

## 2022-01-29 NOTE — PROGRESS NOTES
Orders received, chart reviewed and patient evaluated by physical therapy. Pending progression with skilled acute physical therapy, recommend:  Therapy 3 hours per day 5-7 days per week    Found to have weakness in R S1, L5 and L4 myotomes with numbness/tingling R leg with painful R knee to palpation concerning for possible lumbar dysfunction and R knee injury. Ortho consult recommended. Recommend with nursing OOB to chair 2x/day and  2 person assist and walker. Thank you for completing as able in order to maintain patient strength, endurance and independence. Full evaluation to follow.      Camilla Guallpa, PT

## 2022-01-29 NOTE — PROGRESS NOTES
Transition of Care Plan:    RUR: OBS. Oral and Written notification given to patient and/or caregiver informing patient of current Observation status receiving care in our facility. Copied placed on bedside chart. - Copy given to Pt. Pt refused to sign. CM documented refusal on form and placed on bedside chart. Disposition: TBD    Ortho Consult is pending. Therapy recommending Acute Rehab. CM offered FOC to Pt. Copy placed on bedside chart. Referrals will be sent to the following once evals on are on chart and Ortho MD evaluates Pt:   - [address] pending    Insurance: [employer]. - it will require authorization to go to Acute Rehab. Pt was at [facility] ER 1/25: and they DC home. ER CM was working on setting up [place] but has been unsuccessful in finding [place] provider that can accept. [facility] had set up a Ortho Consult at [facility] on 2/14/22? Follow up appointments: PCP: Alexa Trinidad   DME needed: tbd  Transportation at Discharge: tbd. Sofia Demarco anticipating Pt will need stretcher EMTALA transport to Acute Rehab. [place] or means to access home:     mother   IM Medicare Letter: n/a Medicaid  Is patient a BCPI-A Bundle:        n/a   If yes, was Bundle Letter given?:    Is patient a [place] and connected with the [address] [facility]? no             If yes, was [place] transfer form completed and [facility] notified? Caregiver Contact: Mother: Donice Dance: [phone]  Boyfriend: Alverto [facility]: [phone]  Discharge Caregiver contacted prior to discharge? CM talked to Pt in room and Boyfriend was on the phone and CM provided updates. Pt is alert and oriented and is able to update mom and boyfriend. Care Conference needed?:               TBD  Currently in OBS status. Reason for Admission:  Pt was admitted under OBS on 1/28/22 d/t Recurrent Falls and R Knee Pain. Obesity 5'3\" and 292 lbs.  Pt indicating that she can not care for herself at home with this pain/weakness in Knee. RUR Score:          OBS           Plan for utilizing home health:      Pt came to [facility] and CM there has been trying to get [place] for her but has been unsuccessful in finding an agency that works with her insurance. PCP: First and Last name:  Phillip Nolen   Name of Practice: [facility]   Are you a current patient: Yes/No: yes   Approximate date of last visit: 2 months   Can you participate in a virtual visit with your PCP: yes                    Current Advanced Directive/Advance Care Plan: Full Code      Healthcare Decision Maker: Mother: Trace Saleem: [phone]  Boyfriend: Marilyn Shi: [phone]                 Eval  Pt is alert and oriented. Pt is very detailed oriented. Sherryle Moder and feels that she can no longer take care of herself at home because her knee/leg is in so much pain and weak. She lives with elderly mother Nain Olson who is 66 y/o and she can not pick her up when she falls. Pt has a boyfriend that come over on Wednesday and Thursday but he takes care of elderly parent as well. He has come and helped her up after a fall before. Pt stated she tried outpatient at [facility] but had a fall there and then developed COVID and has not been back. Her goal with therapy is to get up walking and do things for herself since mother has health issues over her own. EMS driver note documented that home was a [place] situation and that they contacted APS. Lives in one story home with 4 BRENDEN.   DME: RW  Needs A currently for all ADLs. Pt does not drive. Mother transports if she is able to get into car. Pharmacy: [facility] in [place]. Pt has prescription drug coverage. CM will continue to monitor discharge plan. Care Management Interventions  PCP Verified by CM:  Yes  Palliative Care Criteria Met (RRAT>21 & CHF Dx)?: No  Mode of Transport at Discharge: BLS  Transition of Care Consult (CM Consult): Discharge Planning,Acute Rehab  MyChart Signup: No  Discharge Durable Medical Equipment: No  Physical Therapy Consult: Yes  Occupational Therapy Consult: Yes  Speech Therapy Consult: No  Support Systems: Parent(s),Spouse/Significant Other  Confirm Follow Up Transport: Family  The Plan for Transition of Care is Related to the Following Treatment Goals : Acute Rehab: Nola CHONG, Awilda Adkins  The Patient and/or Patient Representative was Provided with a Choice of Provider and Agrees with the Discharge Plan?: Yes  Name of the Patient Representative Who was Provided with a Choice of Provider and Agrees with the Discharge Plan: Pt  Freedom of Choice List was Provided with Basic Dialogue that Supports the Patient's Individualized Plan of Care/Goals, Treatment Preferences and Shares the Quality Data Associated with the Providers?: Yes  Discharge Location  Patient Expects to be Discharged to[de-identified] Rehab hospital/unit acute    Mayelin Antu, Weekend   Ext [phone]

## 2022-01-29 NOTE — PROGRESS NOTES
Problem: Falls - Risk of  Goal: *Absence of Falls  Description: Document Julio Hauser Fall Risk and appropriate interventions in the flowsheet.   Outcome: Progressing Towards Goal  Note: Fall Risk Interventions:  Mobility Interventions: PT Consult for mobility concerns,Patient to call before getting OOB         Medication Interventions: Bed/chair exit alarm    Elimination Interventions: Call light in reach    History of Falls Interventions: Bed/chair exit alarm

## 2022-01-29 NOTE — PROGRESS NOTES
Problem: Self Care Deficits Care Plan (Adult)  Goal: *Acute Goals and Plan of Care (Insert Text)  Description: FUNCTIONAL STATUS PRIOR TO ADMISSION: Lives with her mother, who is able to care for herself, but can not take care of the patient. History of frequent falls due to her R knee \"giving out\". Sleeping on the sofa. She indicates that the can not use a RW in the house \"because there is not enough room for it. \" She reports having difficulty getting dressed; likely does not get dressed every day. Mother assists with IADL. HOME SUPPORT: Mother    Occupational Therapy Goals  Initiated 1/29/2022  1. Patient will perform grooming standing up to 2 minutes with contact guard assist within 7 day(s). 2.  Patient will perform lower body dressing with minimal assistance using AE as needed within 7 day(s). 3.  Patient will perform toilet transfers with supervision/SBA within 7 day(s). 4.  Patient will perform all aspects of toileting with minimal assistance within 7 day(s). 5.  Patient will participate in upper extremity therapeutic exercise/activities with Min cues for 10 minutes within 7 day(s). Outcome: Not Met    OCCUPATIONAL THERAPY EVALUATION  Patient: Fernanda Baez (89 y.o. female)  Date: 1/29/2022  Primary Diagnosis: Falls [W19. XXXA]        Precautions:  Fall    ASSESSMENT  Based on the objective data described below, the patient presents with deficits in self-care, primarily due to poor activity tolerance, R knee pain and distal weakness, impaired balance, general weakness, anxiety, fear of falling, and decreased safety. She is quick to say \"I can't\" before she even tries anything. She needs A LOT of encouragement throughout all tasks. Ortho consult is pending for R LE, which significantly impacts function due to pain and weakness. Patient will benefit from skilled OT treatment to maximize independence and safety in ADL.     Current Level of Function Impacting Discharge (ADLs/self-care): up to Total A    Functional Outcome Measure: The patient scored 35/100 on the Barthel Index. Patient will benefit from skilled therapy intervention to address the above noted impairments. PLAN :  Recommendations and Planned Interventions: self care training, functional mobility training, therapeutic exercise, balance training, therapeutic activities, cognitive retraining, endurance activities, neuromuscular re-education, patient education, home safety training and family training/education    Frequency/Duration: Patient will be followed by occupational therapy 4 times a week to address goals. Recommendation for discharge: (in order for the patient to meet his/her long term goals)  Therapy 3 hours per day 5-7 days per week    This discharge recommendation:  Has been made in collaboration with the attending provider and/or case management    IF patient discharges home will need the following DME: TBD       SUBJECTIVE:   Patient stated I CAN'T.    OBJECTIVE DATA SUMMARY:   HISTORY:   History reviewed. No pertinent past medical history. Past Surgical History:   Procedure Laterality Date    HX ANKLE FRACTURE [address]         Expanded or extensive additional review of patient history:     Home Situation  Home Environment: Private residence  # Steps to Enter: 4  Rails to Enter: Yes  Hand Rails : Bilateral (unstable)  One/Two Story Residence: One story  Living Alone: No  Support Systems: Parent(s)  Current DME Used/Available at Home: Crutches,Walker, rolling,Shower chair,Grab bars  Tub or Shower Type: Tub/Shower combination    Hand dominance: Right    EXAMINATION OF PERFORMANCE DEFICITS:  Cognitive/Behavioral Status:  Neurologic State: Alert;Irritable  Orientation Level: Oriented X4  Cognition: Follows commands; Impaired decision making;Poor safety awareness     Perseveration: Perseverates during conversation  Safety/Judgement: Home safety; Fall prevention;Decreased insight into deficits; Decreased awareness of need for safety    Hearing:       Vision/Perceptual:    Not formally assessed. No acute changes reported. Range of Motion:  B carpal tunnel (R worse than L)  AROM: Generally decreased, functional  PROM: Within functional limits                      Strength:  Strength: B UE grossly WFL; mild deficits in R hand                Coordination:  Coordination: Within functional limits  Fine Motor Skills-Upper: Right Impaired;Left Intact    Gross Motor Skills-Upper: Right Intact; Left Intact    Tone & Sensation:  Tone: Normal  Sensation: Impaired (socklike numbess/tingling R leg; B hands due to carpal tunnel)                      Balance:  Sitting: Intact  Standing: Impaired  Standing - Static: Fair;Constant support  Standing - Dynamic : Fair;Constant support    Functional Mobility and Transfers for ADLs:  Bed Mobility:  Supine to Sit: Minimum assistance; Additional time (cues for motor planning)  Scooting: Contact guard assistance    Transfers:  Sit to Stand: Moderate assistance;Assist x2; Additional time  Stand to Sit: Moderate assistance;Minimum assistance;Assist x2  Bed to Chair: Moderate assistance;Assist x2;Adaptive equipment; Additional time (RW)  Toilet Transfer : Moderate assistance;Assist x2 (recommend [facility])  Assistive Device : Walker, rolling    ADL Assessment:  Feeding: Independent    Oral Facial Hygiene/Grooming: Setup    Bathing: Moderate assistance    Upper Body Dressing: Supervision;Setup    Lower Body Dressing: Total assistance    Toileting: Total assistance                ADL Intervention and task modifications:  Patient instructed and indicated understanding the benefits of maintaining activity tolerance, functional mobility, and independence with self care tasks during acute stay  to ensure safe return home and to baseline.  Encouraged patient to increase frequency and duration OOB, be out of bed for all meals, perform daily ADLs (as approved by RN/MD regarding bathing etc), and performing functional mobility to/from bathroom. Pt educated on safe transfer techniques, with specific emphasis on proper hand placement to push up from seated surface rather than attempt to pull self up, fully positioning self in-front of desired seated location, feeling chair on back of legs and reaching back with 1-2 UE to slowly lower self to seated position. Provided verbal cuing for education for breathing techniques including pursed lip breathing techniques (PLB) and circulatory breathing, especially slow deep breaths to help with anxiety. Additionally, patient was educated on energy conservation techniques, including how they specifically apply to functional activities; This includes pacing, rest breaks, and planning. Cognitive Retraining  Safety/Judgement: Home safety; Fall prevention;Decreased insight into deficits; Decreased awareness of need for safety    Functional Measure:    Barthel Index:  Bathin  Bladder: 5  Bowels: 5  Groomin  Dressin  Feeding: 10  Mobility: 0  Stairs: 0  Toilet Use: 0  Transfer (Bed to Chair and Back): 5  Total: 35/100      The Barthel ADL Index: Guidelines  1. The index should be used as a record of what a patient does, not as a record of what a patient could do. 2. The main aim is to establish degree of independence from any help, physical or verbal, however minor and for whatever reason. 3. The need for supervision renders the patient not independent. 4. A patient's performance should be established using the best available evidence. Asking the patient, friends/relatives and nurses are the usual sources, but direct observation and common sense are also important. However direct testing is not needed. 5. Usually the patient's performance over the preceding 24-48 hours is important, but occasionally longer periods will be relevant. 6. Middle categories imply that the patient supplies over 50 per cent of the effort.   7. Use of aids to be independent is allowed. Score Interpretation (from [address] [facility])    Independent   60-79 Minimally independent   40-59 Partially dependent   20-39 Very dependent   <20 Totally dependent     -Iggy Barker., Barthel, D.W. (1965). Functional evaluation: the Barthel Index. [facility] ([address] (1997). The Barthel activities of daily living index: self-reporting versus actual performance in the old (> or = 75 years). Journal of [address] [place] [address] 45(7), [facility], JBRANDENF, Maurice Orr., Janel Burr. (1999). Measuring the change in disability after inpatient rehabilitation; comparison of the responsiveness of the Barthel Index and Functional [place] Measure. Journal of Neurology, Neurosurgery, and Psychiatry, 66(4), 173-441. Kasia Diaz, NNATASHA.JOSEPHINE, EVER Coles, & Darrian Torres, MSiriaA. (2004) Assessment of post-stroke quality of life in cost-effectiveness studies: The usefulness of the Barthel Index and the EuroQoL-5D. Quality of Life Research, 15, 523-49        Occupational Therapy Evaluation Charge Determination   History Examination Decision-Making   LOW Complexity : Brief history review  HIGH Complexity : 5 or more performance deficits relating to physical, cognitive , or psychosocial skils that result in activity limitations and / or participation restrictions LOW Complexity : No comorbidities that affect functional and no verbal or physical assistance needed to complete eval tasks       Based on the above components, the patient evaluation is determined to be of the following complexity level: LOW   Pain Rating:  R knee/LE    Activity Tolerance:   Poor    After treatment patient left in no apparent distress:    Sitting in chair and Call bell within reach    COMMUNICATION/EDUCATION:   The patients plan of care was discussed with: Physical therapist, Registered nurse and Case management.      Patient/family agree to work toward stated goals and plan of care. This patients plan of care is appropriate for delegation to JOSE DAVID.     Thank you for this referral.  Mingo Marsh OTR/L  Time Calculation: 34 mins

## 2022-01-29 NOTE — PROGRESS NOTES
Problem: Mobility Impaired (Adult and Pediatric)  Goal: *Acute Goals and Plan of Care (Insert Text)  Description: FUNCTIONAL STATUS PRIOR TO ADMISSION: recurrent falls due to R knee buckling; has difficulty getting off sofa where she sleeps; reports home is too cluttered to use a RW. HOME SUPPORT PRIOR TO ADMISSION: The patient lived with mother in 1 story home with 4 steps to enter and unstable rails. Mother assists patient with ADLs and meals. Physical Therapy Goals  Initiated 1/29/2022  1. Patient will move from supine to sit and sit to supine , scoot up and down, and roll side to side in bed with supervision/set-up within 7 day(s). 2.  Patient will transfer from bed to chair and chair to bed with minimal assistance/contact guard assist using the least restrictive device within 7 day(s). 3.  Patient will perform sit to stand with minimal assistance/contact guard assist within 7 day(s). 4.  Patient will ambulate with minimal assistance/contact guard assist for 35 feet with the least restrictive device within 7 day(s). 5.  Patient will ascend/descend 4 stairs with 1 handrail(s) with minimal assistance/contact guard assist within 7 day(s). Outcome: Not Met   PHYSICAL THERAPY EVALUATION  Patient: Billie Flores (59 y.o. female)  Date: 1/29/2022  Primary Diagnosis: Falls [W19. XXXA]        Precautions: spine; Fall    ASSESSMENT  Based on the objective data described below, the patient presents with RLE weakness distal>proximal with R foot drop and numbness/tingling from right knee down, increased R knee pain with light palpation, decreased standing balance due to RLE deficits and decreased mobility skills with high risk for falls. Weakness noted in R S1, L5, L4, and L3 myotomes; reported some numbness in LLE but much less than RLE. Needed assist to come to sitting. 2 person assist sit to stand and bed to chair with RW support.  She lives in her mothers home which is too cluttered for her to use RW at this time. Reports her RLE just alex unexpectedly and she falls. Recommend ortho consult to assess knee and possible lumbar spine involvement in symptoms. Current Level of Function Impacting Discharge (mobility/balance): min a sit to supine; mod a x 2 sit to stand with bed height raised; mod a x 2 bed to chair with RW taking very small steps; reports increased pain in R knee with WB during transfers. Functional Outcome Measure: The patient scored 35 on the Barthel outcome measure which is indicative of being very dependent for ADLs and mobility at this time. Other factors to consider for discharge: lives with mother who assists with meals and ADLs; high risk for continued falls if unassisted; may have knee injury and/or lumbar spine dysfunction. Patient will benefit from skilled therapy intervention to address the above noted impairments. PLAN :  Recommendations and Planned Interventions: bed mobility training, transfer training, gait training, therapeutic exercises, neuromuscular re-education, modalities, edema management/control, patient and family training/education and therapeutic activities      Frequency/Duration: Patient will be followed by physical therapy:  4 times a week to address goals. Recommendation for discharge: (in order for the patient to meet his/her long term goals)  Therapy 3 hours per day 5-7 days per week    This discharge recommendation:  Has been made in collaboration with the attending provider and/or case management    IF patient discharges home will need the following DME: rolling walker       SUBJECTIVE:   Patient stated  I can't stand up or walk.     OBJECTIVE DATA SUMMARY:   HISTORY:    History reviewed. No pertinent past medical history.   Past Surgical History:   Procedure Laterality Date    HX ANKLE FRACTURE TX         Personal factors and/or comorbidities impacting plan of care: morbid obesity, recurrent falls    Home Situation  Home Environment: Private residence  # Steps to Enter: 4  Rails to Enter: Yes  Hand Rails : Bilateral (unstable)  One/Two Story Residence: One story  Living Alone: No  Support Systems: Parent(s)  Current DME Used/Available at Home: Crutches,Walker, rolling,Shower chair,Grab bars  Tub or Shower Type: Tub/Shower combination    EXAMINATION/PRESENTATION/DECISION MAKING:   Critical Behavior:  Neurologic State: Alert,Irritable  Orientation Level: Oriented X4  Cognition: Follows commands,Impaired decision making,Poor safety awareness  Safety/Judgement: Home safety,Fall prevention,Decreased insight into deficits,Decreased awareness of need for safety  Hearing:   good  Skin:  R knee mildly bruised  Edema: mild edema R knee  Range Of Motion:  AROM: Generally decreased, functional           PROM: Within functional limits           Strength:    Strength: Generally decreased, functional (R: S1 2+/5; L5 0/5; L4 0/5; L3 3-/5; L2 3/5; 4/5 otherwise)                    Tone & Sensation:   Tone: Normal              Sensation: Impaired (socklike numbess/tingling R leg)               Coordination:  Coordination: Within functional limits  Vision:      Functional Mobility:  Bed Mobility:     Supine to Sit: Minimum assistance; Additional time (cues for motor planning)     Scooting: Contact guard assistance  Transfers:  Sit to Stand: Moderate assistance;Assist x2; Additional time  Stand to Sit: Moderate assistance;Minimum assistance;Assist x2        Bed to Chair: Moderate assistance;Assist x2;Adaptive equipment; Additional time (RW)              Balance:   Sitting: Intact  Standing: Impaired  Standing - Static: Fair;Constant support  Standing - Dynamic : Fair;Constant support  Ambulation/Gait Training:              Gait Description (WDL):  (took some small steps to chair with RW)     Right Side Weight Bearing: As tolerated  Left Side Weight Bearing: Full                         Functional Measure:  Barthel Index:    Bathin  Bladder: 5  Bowels: 5  Grooming: 5  Dressin  Feeding: 10  Mobility: 0  Stairs: 0  Toilet Use: 0  Transfer (Bed to Chair and Back): 5  Total: 35/100       The Barthel ADL Index: Guidelines  1. The index should be used as a record of what a patient does, not as a record of what a patient could do. 2. The main aim is to establish degree of independence from any help, physical or verbal, however minor and for whatever reason. 3. The need for supervision renders the patient not independent. 4. A patient's performance should be established using the best available evidence. Asking the patient, friends/relatives and nurses are the usual sources, but direct observation and common sense are also important. However direct testing is not needed. 5. Usually the patient's performance over the preceding 24-48 hours is important, but occasionally longer periods will be relevant. 6. Middle categories imply that the patient supplies over 50 per cent of the effort. 7. Use of aids to be independent is allowed. Score Interpretation (from [address] [facility] [address])    Independent   60-79 Minimally independent   40-59 Partially dependent   20-39 Very dependent   <20 Totally dependent     -Iggy Barker., Barthel, D.W. (1965). Functional evaluation: the Barthel Index. [address] [facility] ([address] [place] [address] (). The Barthel activities of daily living index: self-reporting versus actual performance in the old (> or = 75 years). Journal of [address] [place] [address] 45(7), [address] [facility], ..., [facility]., [facility]. (). Measuring the change in disability after inpatient rehabilitation; comparison of the responsiveness of the Barthel Index and Functional Lequire Measure. Journal of Neurology, Neurosurgery, and Psychiatry, 66(4), 384-420. Clemencia Gilford, N.J.A, KISHORE Coles.MARY, & Ericka Flannery M.A. (2004) Assessment of post-stroke quality of life in cost-effectiveness studies:  The usefulness of the Barthel Index and the EuroQoL-5D. Quality of Life Research, 15, 140-36        Physical Therapy Evaluation Charge Determination   History Examination Presentation Decision-Making   HIGH Complexity :3+ comorbidities / personal factors will impact the outcome/ POC  HIGH Complexity : 4+ Standardized tests and measures addressing body structure, function, activity limitation and / or participation in recreation  HIGH Complexity : Unstable and unpredictable characteristics  Other outcome measures Barthel  HIGH       Based on the above components, the patient evaluation is determined to be of the following complexity level: HIGH     Pain Ratin/10 R knee cap with palpation    Activity Tolerance:   Fair, SpO2 stable on RA and requires rest breaks    After treatment patient left in no apparent distress:   Sitting in chair and Call bell within reach    COMMUNICATION/EDUCATION:   The patients plan of care was discussed with: Occupational therapist, Registered nurse and Case management. Fall prevention education was provided and the patient/caregiver indicated understanding., Patient/family have participated as able in goal setting and plan of care. and Patient/family agree to work toward stated goals and plan of care.     Thank you for this referral.  Blayne Sullivan, PT   Time Calculation: 34 mins

## 2022-01-29 NOTE — H&P
Hospitalist Admission Note    NAME: Robbi Jordan   :  1980   MRN:  [id number]     Date/Time:  2022 3:15 AM    Patient PCP: Unknown, Provider, AMILCAR  ______________________________________________________________________  Given the patient's current clinical presentation, I have a high level of concern for decompensation if discharged from the emergency department. Complex decision making was performed, which includes reviewing the patient's available past medical records, laboratory results, and x-ray films. My assessment of this patient's clinical condition and my plan of care is as follows. Assessment / Plan:  Right knee pain  Recurrent falls  Ambulatory dysfunction  History of arthritis  Morbid obesity  Tobacco abuse  We will admit under observation, as needed ibuprofen oxycodone, falls precautions  PT OT and  consult  Ortho consult  Check HbA1c, TSH  We will order nicotine patch  Patient counseled about weight loss    Code Status: Full code  Surrogate Decision Maker:    DVT Prophylaxis: Lovenox  GI Prophylaxis: not indicated    Baseline: Ambulatory      Subjective:   CHIEF COMPLAINT: Recurrent falls and right knee pain    HISTORY OF PRESENT ILLNESS:     Robbi Jordan is a 42 y.o.  female history of tobacco abuse, arthritis who presents with complaint of right knee pain and ambulatory dysfunction, status post recurrent falls. Patient reported having right knee pain for the last 5 months, has been getting gradually worse leading to difficulty getting up to perform her ADL, lately has been difficult to get up to go to the bathroom. Also reported recurrent falls, has had for falls in total.  She said her right knee just gives out , then she falls. Also reported numbness and tingling sensation of lower extremity. She lives with her 70-year-old mom who cannot help her.   She is being admitted for PT OT evaluation and possible rehab placement  Vital signs are stable, labs unremarkable  Right knee x-ray showed no fracture  We were asked to admit for work up and evaluation of the above problems. No past medical history on file. No past surgical history on file. Social History     Tobacco Use    Smoking status: Current Every Day Smoker     Packs/day: 0.25     Years: 22.00     Pack years: 5.50    Smokeless tobacco: Never Used   Substance Use Topics    Alcohol use: Yes        No family history on file. No Known Allergies     Prior to Admission medications    Medication Sig Start Date End Date Taking? Authorizing Provider   ibuprofen (MOTRIN) 600 mg tablet Take 1 Tablet by mouth every six (6) hours as needed for Pain. 1/25/22   Fito Ivan MD       REVIEW OF SYSTEMS:     I am not able to complete the review of systems because:    The patient is intubated and sedated    The patient has altered mental status due to his acute medical problems    The patient has baseline aphasia from prior stroke(s)    The patient has baseline dementia and is not reliable historian    The patient is in acute medical distress and unable to provide information           Total of 12 systems reviewed as follows:       POSITIVE= underlined text  Negative = text not underlined  General:  fever, chills, sweats, generalized weakness, weight loss/gain,      loss of appetite   Eyes:    blurred vision, eye pain, loss of vision, double vision  ENT:    rhinorrhea, pharyngitis   Respiratory:   cough, sputum production, SOB, BECKETT, wheezing, pleuritic pain   Cardiology:   chest pain, palpitations, orthopnea, PND, edema, syncope   Gastrointestinal:  abdominal pain , N/V, diarrhea, dysphagia, constipation, bleeding   Genitourinary:  frequency, urgency, dysuria, hematuria, incontinence   Muskuloskeletal :  arthralgia, myalgia, back pain  Hematology:  easy bruising, nose or gum bleeding, lymphadenopathy   Dermatological: rash, ulceration, pruritis, color change / jaundice  Endocrine: hot flashes or polydipsia   Neurological:  headache, dizziness, confusion, focal weakness, paresthesia,     Speech difficulties, memory loss, gait difficulty  Psychological: Feelings of anxiety, depression, agitation    Objective:   VITALS:    Visit Vitals  BP (!) 123/94 (BP 1 Location: Left upper arm, BP Patient Position: At rest)   Pulse 100   Temp 98.1 °F (36.7 °C)   Resp 20   SpO2 97%       PHYSICAL EXAM:    General:    Alert, cooperative, no distress, appears stated age. HEENT: Atraumatic, anicteric sclerae, pink conjunctivae     No oral ulcers, mucosa moist, throat clear, dentition fair  Neck:  Supple, symmetrical,  thyroid: non tender  Lungs:   Clear to auscultation bilaterally. No Wheezing or Rhonchi. No rales. Chest wall:  No tenderness  No Accessory muscle use. Heart:   Regular  rhythm,  No  murmur   No edema  Abdomen:   Soft, non-tender. Not distended. Bowel sounds normal  Extremities: No cyanosis. No clubbing,      Skin turgor normal, Capillary refill normal, Radial dial pulse 2+  Skin:     Not pale. Not Jaundiced  No rashes   Psych:  Good insight. Not depressed. Not anxious or agitated. Neurologic: EOMs intact. No facial asymmetry. No aphasia or slurred speech. Symmetrical strength, Sensation grossly intact.  Alert and oriented X 4.     _______________________________________________________________________  Care Plan discussed with:    Comments   Patient     Family      RN     Care Manager                    Consultant:      _______________________________________________________________________  Expected  Disposition:   Home with Family    HH/PT/OT/RN    SNF/LTC    IONA    ________________________________________________________________________  TOTAL TIME:  72 Minutes    Critical Care Provided     Minutes non procedure based      Comments     Reviewed previous records   >50% of visit spent in counseling and coordination of care  Discussion with patient and/or family and questions answered       ________________________________________________________________________  Signed: Rosie Jin MD    Procedures: see electronic medical records for all procedures/Xrays and details which were not copied into this note but were reviewed prior to creation of Plan. LAB DATA REVIEWED:    Recent Results (from the past 24 hour(s))   CBC WITH AUTOMATED DIFF    Collection Time: 01/29/22 12:45 AM   Result Value Ref Range    WBC 8.3 3.6 - 11.0 K/uL    RBC 3.65 (L) 3.80 - 5.20 M/uL    HGB 13.6 11.5 - 16.0 g/dL    HCT 41.4 35.0 - 47.0 %    .4 (H) 80.0 - 99.0 FL    MCH 37.3 (H) 26.0 - 34.0 PG    MCHC 32.9 30.0 - 36.5 g/dL    RDW 14.6 (H) 11.5 - 14.5 %    PLATELET 676 863 - 132 K/uL    MPV 10.2 8.9 - 12.9 FL    NRBC 0.0 0  WBC    ABSOLUTE NRBC 0.00 0.00 - 0.01 K/uL    NEUTROPHILS 69 32 - 75 %    LYMPHOCYTES 24 12 - 49 %    MONOCYTES 4 (L) 5 - 13 %    EOSINOPHILS 2 0 - 7 %    BASOPHILS 0 0 - 1 %    IMMATURE GRANULOCYTES 1 (H) 0.0 - 0.5 %    ABS. NEUTROPHILS 5.7 1.8 - 8.0 K/UL    ABS. LYMPHOCYTES 2.0 0.8 - 3.5 K/UL    ABS. MONOCYTES 0.3 0.0 - 1.0 K/UL    ABS. EOSINOPHILS 0.2 0.0 - 0.4 K/UL    ABS. BASOPHILS 0.0 0.0 - 0.1 K/UL    ABS. IMM.  GRANS. 0.1 (H) 0.00 - 0.04 K/UL    DF SMEAR SCANNED      RBC COMMENTS MACROCYTOSIS  1+       METABOLIC PANEL, BASIC    Collection Time: 01/29/22 12:45 AM   Result Value Ref Range    Sodium 137 136 - 145 mmol/L    Potassium 4.4 3.5 - 5.1 mmol/L    Chloride 107 97 - 108 mmol/L    CO2 24 21 - 32 mmol/L    Anion gap 6 5 - 15 mmol/L    Glucose 91 65 - 100 mg/dL    BUN 17 6 - 20 MG/DL    Creatinine 1.00 0.55 - 1.02 MG/DL    BUN/Creatinine ratio 17 12 - 20      GFR est AA >60 >60 ml/min/1.73m2    GFR est non-AA >60 >60 ml/min/1.73m2    Calcium 8.5 8.5 - 10.1 MG/DL   CK    Collection Time: 01/29/22 12:45 AM   Result Value Ref Range     (H) 26 - 192 U/L

## 2022-01-30 PROCEDURE — 74011250637 HC RX REV CODE- 250/637: Performed by: INTERNAL MEDICINE

## 2022-01-30 PROCEDURE — 96372 THER/PROPH/DIAG INJ SC/IM: CPT

## 2022-01-30 PROCEDURE — 74011000250 HC RX REV CODE- 250: Performed by: INTERNAL MEDICINE

## 2022-01-30 PROCEDURE — G0378 HOSPITAL OBSERVATION PER HR: HCPCS

## 2022-01-30 PROCEDURE — 74011250636 HC RX REV CODE- 250/636: Performed by: INTERNAL MEDICINE

## 2022-01-30 PROCEDURE — 99223 1ST HOSP IP/OBS HIGH 75: CPT | Performed by: PSYCHIATRY & NEUROLOGY

## 2022-01-30 RX ORDER — MELOXICAM 7.5 MG/1
7.5 TABLET ORAL DAILY
Status: DISCONTINUED | OUTPATIENT
Start: 2022-01-30 | End: 2022-02-13 | Stop reason: HOSPADM

## 2022-01-30 RX ADMIN — OXYCODONE HYDROCHLORIDE 5 MG: 5 TABLET ORAL at 04:31

## 2022-01-30 RX ADMIN — SODIUM CHLORIDE, PRESERVATIVE FREE 10 ML: 5 INJECTION INTRAVENOUS at 13:23

## 2022-01-30 RX ADMIN — ENOXAPARIN SODIUM 40 MG: 100 INJECTION SUBCUTANEOUS at 13:21

## 2022-01-30 RX ADMIN — MELOXICAM 7.5 MG: 7.5 TABLET ORAL at 13:23

## 2022-01-30 NOTE — CONSULTS
ORTHOPAEDIC CONSULT NOTE    Subjective:     Date of Consultation:  January 29, 2022      Ragini Carney is a 42 y.o. female who is being seen for right knee pain, difficulty ambulating. Pt reports she has been suffering right knee pain for 6mths, states she hasn't been able to get out of a chair(from sit to stand). C/O of numbness of the leg, below the knee. States she can move her ankle. Ambulates at baseline for very short distances w walker. Pt notes several fall over the last 6mths. She thinks her RLE problems are a result of relying on it so much while her Left ankle healed-up after fx(treated by [facility])    States she is scared and cant go home b/c her 68 yo mother cant help her. Inquiring about smoking a cig/e-sig ASAP---admits that she smokes to much. Patient Active Problem List    Diagnosis Date Noted    Falls 01/29/2022     History reviewed. No pertinent family history. Social History     Tobacco Use    Smoking status: Current Every Day Smoker     Packs/day: 0.25     Years: 22.00     Pack years: 5.50    Smokeless tobacco: Never Used   Substance Use Topics    Alcohol use: Yes     History reviewed. No pertinent past medical history. Past Surgical History:   Procedure Laterality Date    HX ANKLE FRACTURE TX        Prior to Admission medications    Medication Sig Start Date End Date Taking? Authorizing Provider   ibuprofen (MOTRIN) 600 mg tablet Take 1 Tablet by mouth every six (6) hours as needed for Pain.  1/25/22   Patricia Beverly MD     Current Facility-Administered Medications   Medication Dose Route Frequency    ibuprofen (MOTRIN) tablet 600 mg  600 mg Oral Q6H PRN    sodium chloride (NS) flush 5-40 mL  5-40 mL IntraVENous Q8H    sodium chloride (NS) flush 5-40 mL  5-40 mL IntraVENous PRN    acetaminophen (TYLENOL) tablet 650 mg  650 mg Oral Q6H PRN    Or    acetaminophen (TYLENOL) suppository 650 mg  650 mg Rectal Q6H PRN    polyethylene glycol (MIRALAX) packet 17 g  17 g Oral DAILY PRN    ondansetron (ZOFRAN ODT) tablet 4 mg  4 mg Oral Q8H PRN    Or    ondansetron (ZOFRAN) injection 4 mg  4 mg IntraVENous Q6H PRN    enoxaparin (LOVENOX) injection 40 mg  40 mg SubCUTAneous DAILY    acetaminophen (TYLENOL) tablet 650 mg  650 mg Oral Q6H PRN    oxyCODONE IR (ROXICODONE) tablet 5 mg  5 mg Oral Q4H PRN    nicotine (NICODERM CQ) 14 mg/24 hr patch 1 Patch  1 Patch TransDERmal DAILY      No Known Allergies     Review of Systems:  A comprehensive review of systems was negative except for that written in the HPI. Mental Status: no dementia    Objective:     Patient Vitals for the past 8 hrs:   BP Temp Pulse Resp SpO2   22 1542 (!) 118/57 98.6 °F (37 °C) 90 19 94 %     Temp (24hrs), Av.2 °F (36.8 °C), Min:98 °F (36.7 °C), Max:98.6 °F (37 °C)    BMI: 51+  Gen: Well-developed,  in no acute distress, obese, anxious  HEENT: Pink conjunctivae, hearing intact to voice, moist mucous membranes   Neck: Supple  Resp: No respiratory distress   Card: RRR, palpable distal pulse-equal bilaterally, birsk cap refill all distal digits   Abd:  non-distended  Musc: NO knee effusion. NO erythema. Hyper acute ttp of the entire knee. Full ex/[id number]. No collateral instability , Intact ext mech. Minimal calf ttp. Intact achilles mech. Pt is either unable or unwilling to dorsiflex her ankle or EHL. Plantar flexion 2/5  Skin: fingers dusky/grey in color likely due to cigarette smoking. No skin breakdown noted. Skin warm, pink, dry  Neuro: Cranial nerves are grossly intact,   Psych: Emotional Good insight, oriented to person, place and time, alert    Imaging Review: EXAM: XR KNEE RT 3 V   INDICATION: Right knee pain.   COMPARISON: 2022.   FINDINGS: Three views of the right knee demonstrate no acute fracture or bony  erosion. The joint spaces are maintained. There is no joint effusion.   IMPRESSION--No acute abnormality.     Labs:   Recent Results (from the past 24 hour(s))   CBC WITH AUTOMATED DIFF    Collection Time: 01/29/22 12:45 AM   Result Value Ref Range    WBC 8.3 3.6 - 11.0 K/uL    RBC 3.65 (L) 3.80 - 5.20 M/uL    HGB 13.6 11.5 - 16.0 g/dL    HCT 41.4 35.0 - 47.0 %    .4 (H) 80.0 - 99.0 FL    MCH 37.3 (H) 26.0 - 34.0 PG    MCHC 32.9 30.0 - 36.5 g/dL    RDW 14.6 (H) 11.5 - 14.5 %    PLATELET 413 231 - 560 K/uL    MPV 10.2 8.9 - 12.9 FL    NRBC 0.0 0  WBC    ABSOLUTE NRBC 0.00 0.00 - 0.01 K/uL    NEUTROPHILS 69 32 - 75 %    LYMPHOCYTES 24 12 - 49 %    MONOCYTES 4 (L) 5 - 13 %    EOSINOPHILS 2 0 - 7 %    BASOPHILS 0 0 - 1 %    IMMATURE GRANULOCYTES 1 (H) 0.0 - 0.5 %    ABS. NEUTROPHILS 5.7 1.8 - 8.0 K/UL    ABS. LYMPHOCYTES 2.0 0.8 - 3.5 K/UL    ABS. MONOCYTES 0.3 0.0 - 1.0 K/UL    ABS. EOSINOPHILS 0.2 0.0 - 0.4 K/UL    ABS. BASOPHILS 0.0 0.0 - 0.1 K/UL    ABS. IMM. GRANS. 0.1 (H) 0.00 - 0.04 K/UL    DF SMEAR SCANNED      RBC COMMENTS MACROCYTOSIS  1+       METABOLIC PANEL, BASIC    Collection Time: 01/29/22 12:45 AM   Result Value Ref Range    Sodium 137 136 - 145 mmol/L    Potassium 4.4 3.5 - 5.1 mmol/L    Chloride 107 97 - 108 mmol/L    CO2 24 21 - 32 mmol/L    Anion gap 6 5 - 15 mmol/L    Glucose 91 65 - 100 mg/dL    BUN 17 6 - 20 MG/DL    Creatinine 1.00 0.55 - 1.02 MG/DL    BUN/Creatinine ratio 17 12 - 20      GFR est AA >60 >60 ml/min/1.73m2    GFR est non-AA >60 >60 ml/min/1.73m2    Calcium 8.5 8.5 - 10.1 MG/DL   CK    Collection Time: 01/29/22 12:45 AM   Result Value Ref Range     (H) 26 - 192 U/L         Impression:     Patient Active Problem List    Diagnosis Date Noted    Falls 01/29/2022     Active Problems:    Falls (1/29/2022)        Plan:     RLE weakness/foot drop  Consider lumbar stenosis? ?? Disk herniation ??? Radicular cause? ?   She denies sig LBP, radicular pain of the buttock/thigh---    - Left knee without concern for infectious process at this time  - Pt's body habitus in combination with generalized deconditioning/muscular weakness are likely the primary contributor to her declining function   - Generalized bony knee pain due to overload   - NO knee intervention at this time  - Smoking cessation   - Wt loss through dietary changes   -  Considering adding once a day NSAID- Celebrex or mobic if OK with medical team  - Pt should continue working with PT/OT- upper and lower body strengthening   - She need to off load her RLE w/ a walker  - If right foot drop is persistent--- recommend AFO  - She is not safe to return home in her current condition-- She needs placement    Will consult neurology to evaluate her plantarflexion weakness    Dr. Londa Buerger aware and agrees with plan as above.         PREET Braswell-C  [employer]

## 2022-01-30 NOTE — PROGRESS NOTES
Hospitalist Progress Note    NAME: Fernanda Baez   :  1980   MRN:  [id number]       Assessment / Plan:  Right knee pain POA  Causing Recurrent falls at home POA- per pt causes buckling  Ambulatory dysfunction due to above  History of arthritis  H/o L ankle Fracture s/p ORIF at [facility] ortho  Morbid obesity POA  Tobacco abuse     Pt under observation stay  Started Mobic per recommendations of Orthopedic team  Cont PO oxycodone prn severe pain  Cont falls precautions  IP PT OT consult noted-- recommends IPR  IP  consulted- working on [employer] placement  IP Ortho consult appreciated-   - Generalized bony knee pain due to overload - NO KNEE intervention recommended  -  Wt loss through dietary changes   -  Considering adding once a day NSAID- Celebrex or mobic  - Rehab for upper and lower body strengthening  - AFO if R foot drop persists  - IP Neurology consulted for recommendations on R Foot drop  Check HbA1c, TSH stat today  Cont nicotine patch  Patient counseled about weight loss- discussed bariatric surgery options, pt not motivated for it but understands the weight is the crux of all her problems     Code Status: Full code  Surrogate Decision Maker: Mother 79 yrs old at home who cant help physically     DVT Prophylaxis: Lovenox  GI Prophylaxis: not indicated     Baseline: Ambulatory    Estimated discharge date:   Barriers: IPRehab placement    Recommended Disposition: [facility]/Rehab being looked into     Subjective:     Chief Complaint / Reason for Physician Visit: F/U R Knee pain/Buckling, Recurrent falls at home, Obesity, inability to ambulate  \"I am ok\". Discussed with RN events overnight.      Review of Systems:  Symptom Y/N Comments  Symptom Y/N Comments   Fever/Chills n   Chest Pain n    Poor Appetite n   Edema n    Cough n   Abdominal Pain n    Sputum n   Joint Pain y R Knee   SOB/BECKETT n   Pruritis/Rash n    Nausea/vomit n   Tolerating PT/OT y    Diarrhea    Tolerating Diet y Constipation    Other       Could NOT obtain due to:      Objective:     VITALS:   Last 24hrs VS reviewed since prior progress note. Most recent are:  Patient Vitals for the past 24 hrs:   Temp Pulse Resp BP SpO2   01/29/22 2358 97.9 °F (36.6 °C) 89 20 (!) 112/52 92 %   01/29/22 1959 98 °F (36.7 °C) 95 20 137/77 95 %   01/29/22 1542 98.6 °F (37 °C) 90 19 (!) 118/57 94 %   01/29/22 0812 98.1 °F (36.7 °C) (!) 113 19 111/64 97 %     No intake or output data in the 24 hours ending 01/30/22 0810     I had a face to face encounter and independently examined this patient on 1/30/2022, as outlined below:  PHYSICAL EXAM:  General: WD, WN. Alert, cooperative, no acute distress, Obese +    EENT:  EOMI. Anicteric sclerae. MMM  Resp:  CTA bilaterally, no wheezing or rales. No accessory muscle use  CV:  Regular  rhythm,  No edema  GI:  Soft, Non distended, Non tender. +Bowel sounds  Neurologic:  Alert and oriented X 3, normal speech,   Psych:   Good insight. Not anxious nor agitated  Skin:  No rashes. No jaundice    Reviewed most current lab test results and cultures  YES  Reviewed most current radiology test results   YES  Review and summation of old records today    NO  Reviewed patient's current orders and MAR    YES  PMH/ reviewed - no change compared to H&P  ________________________________________________________________________  Care Plan discussed with:    Comments   Patient x    Family      RN x    Care Manager     Consultant                        Multidiciplinary team rounds were held today with , nursing, pharmacist and clinical coordinator. Patient's plan of care was discussed; medications were reviewed and discharge planning was addressed.      ________________________________________________________________________  Total NON critical care TIME:  36   Minutes    Total CRITICAL CARE TIME Spent:   Minutes non procedure based      Comments   >50% of visit spent in counseling and coordination of care ________________________________________________________________________  Anita Weston MD     Procedures: see electronic medical records for all procedures/Xrays and details which were not copied into this note but were reviewed prior to creation of Plan. LABS:  I reviewed today's most current labs and imaging studies.   Pertinent labs include:  Recent Labs     01/29/22 0045   WBC 8.3   HGB 13.6   HCT 41.4        Recent Labs     01/29/22 0045      K 4.4      CO2 24   GLU 91   BUN 17   CREA 1.00   CA 8.5       Signed: Anita Weston MD

## 2022-01-30 NOTE — PROGRESS NOTES
Problem: Falls - Risk of  Goal: *Absence of Falls  Description: Document Lettie Duverney Fall Risk and appropriate interventions in the flowsheet.   Note: Fall Risk Interventions:  Mobility Interventions: Bed/chair exit alarm         Medication Interventions: Bed/chair exit alarm    Elimination Interventions: Call light in reach    History of Falls Interventions: Bed/chair exit alarm

## 2022-01-30 NOTE — PROGRESS NOTES
End of Shift Note    Bedside shift change report given to iVncent Ronquillo (oncoming nurse) by Vinicius Clarke (offgoing nurse). Report included the following information SBAR and Kardex    Shift worked:  7a-7p     Shift summary and any significant changes:     Hourly rounding completed. Meds given see MAR. PT/OT saw patient today. Patient up in chair. Waiting placement or d/c back home. Will need COVID test if going to facility. Concerns for physician to address:       Zone phone for oncoming shift:          Activity:  Activity Level: Chair  Number times ambulated in hallways past shift: 0  Number of times OOB to chair past shift: 2    Cardiac:   Cardiac Monitoring: Yes           Access:   Current line(s): PIV     Genitourinary:   Urinary status: incontinent    Respiratory:   O2 Device: None (Room air)  Chronic home O2 use?: NO  Incentive spirometer at bedside: N/A     GI:     Current diet:  ADULT DIET Regular  Passing flatus: YES  Tolerating current diet: YES       Pain Management:   Patient states pain is manageable on current regimen: YES    Skin:  Tru Score: 14  Interventions: increase time out of bed and PT/OT consult    Patient Safety:  Fall Score:  Total Score: 4  Interventions: bed/chair alarm, assistive device (walker, cane, etc), gripper socks and pt to call before getting OOB  High Fall Risk: Yes    Length of Stay:  Expected LOS: - - -  Actual LOS: 0      Vinicius Clarke

## 2022-01-30 NOTE — CONSULTS
Date of Consultation:  January 30, 2022    Referring Physician: stefano     Reason for Consultation:  Weakness, right foot drop     Chief Complaint   Patient presents with    Knee Pain     Patient arrives via EMS from home after her right knee \"gave out\" and the patient fell. Patient is unable to take care of herself and needs long term care admission. History of Present Illness:   Radha Wesley is a 42 y.o. female with hx of left ankle fracture who who is currently admitted to the hospital after she had recurrent falls at home. Patient provided much of the history and she reports that this started approximately 2 weeks ago where she started having increasing difficulty with ambulation and  standing from a seated position. She indicates that she started having numbness and tingling in her right knee which has now progressed to pain. She reports that she has had a few falls at home which ultimately led her to being admitted. She does also complain of lower back pain which is worse with ambulation however better when resting. She does report that she has had a fracture to the left ankle and still has pain and numbness in that area. History reviewed. No pertinent past medical history. Past Surgical History:   Procedure Laterality Date    HX ANKLE FRACTURE TX          History reviewed. No pertinent family history. Social History     Tobacco Use    Smoking status: Current Every Day Smoker     Packs/day: 0.25     Years: 22.00     Pack years: 5.50    Smokeless tobacco: Never Used   Substance Use Topics    Alcohol use: Yes        No Known Allergies     Prior to Admission medications    Medication Sig Start Date End Date Taking? Authorizing Provider   ibuprofen (MOTRIN) 600 mg tablet Take 1 Tablet by mouth every six (6) hours as needed for Pain.  1/25/22   Chris Robison MD       Review of Systems:    General, constitutional: negative  Eyes, vision: negative  Ears, nose, throat: negative  Cardiovascular, heart: negative  Respiratory: negative  Gastrointestinal: negative  Genitourinary: negative  Musculoskeletal: negative  Skin and integumentary: negative  Psychiatric: negative  Endocrine: negative  Neurological: negative, except for HPI  Hematologic/lymphatic: negative  Allergy/immunology: negative    PHYSICAL EXAMINATION:   Visit Vitals  BP (!) 139/94 (BP 1 Location: Right upper arm, BP Patient Position: At rest)   Pulse (!) 110   Temp 98.8 °F (37.1 °C)   Resp 21   SpO2 94%       Physical Exam:  General:  no acute distress  Neck: no carotid bruits  Lungs: clear to auscultation  Heart:  no murmurs, regular rate and rhythm   Lower extremity: no edema    Neurological exam:  Mental Status: Awake, alert, oriented to person, place and time  Attention and Concentration: able to state the days of the week backwards   Speech and Language: No dysarthria. Able to name, repeat and follow commands   Fund of knowledge was preserved    Cranial nerves: II-XII:  Pupils equal and reactive, visual fields intact by confrontation   Extraocular movements intact, no evidence of nystagmus or ptosis   Facial sensation intact   Facial movements symmetric   Hearing intact to soft rub bilaterally   Shoulder shrug symmetric and strong   Tongue protrusion full and midline without fasciculation or atrophy    Motor:   Normal tone and Bulk   Drift: No evidence of pronator drift     Strength testing:   deltoid triceps biceps Wrist ext. Wrist flex. intrinsics   Right 5 5 5 3 3 4   Left 5 5 5 5 5 5      Hip flex. Hip ext. Knee ext. Knee flex Dorsi flex Plantar flex   Right  1 1 1 1 0 0   Left  5 5 5 5 5 5     Movement in the right leg appeared to be limited by effort. She did have a positive Wall sign. Sensory:  Patchy sensory loss in the bilateral upper and lower extremities.   This did not follow dermatomal pattern    Reflexes:   Biceps Triceps  Brachiorad Patellar Achilles Plantar Hoffmans   Right  2 2 2 2 2 Down pos   Left  2 2 2 2 2 Down Pos       Cerebellar testing: Finger-nose-finger intact. She was unable to do heel-to-shin given the weakness. Gait was deferred given her falls and weakness    Data:     Lab Results   Component Value Date/Time    Sodium 137 01/29/2022 12:45 AM    Potassium 4.4 01/29/2022 12:45 AM    Chloride 107 01/29/2022 12:45 AM    Glucose 91 01/29/2022 12:45 AM    BUN 17 01/29/2022 12:45 AM    Creatinine 1.00 01/29/2022 12:45 AM    Calcium 8.5 01/29/2022 12:45 AM    WBC 8.3 01/29/2022 12:45 AM    HCT 41.4 01/29/2022 12:45 AM    HGB 13.6 01/29/2022 12:45 AM    PLATELET 272 98/29/5739 12:45 AM       Imaging:    No results found for this or any previous visit. No results found for this or any previous visit. IMPRESSION/RECOMMENDATIONS:  Brandy Carter is a 42 y.o. female with hx of left ankle fracture who who is currently admitted to the hospital after she had recurrent falls at home, her neurological does reveal weakness on the right side which is greater than the left however she does not appear to be giving full effort when checking the right side. Her reflexes however do appear to be hyperactive with positive Jesu sign bilaterally. This may indicate a cervical lesion.  -Would recommend obtaining an eye of the cervical and lumbar spine to check for radiculopathy and cervical or lumbar stenosis. - If the above imaging is negative, would recommend obtaining an EMG with nerve conduction studies. This however would need to be done as an outpatient. We will continue with physical therapy and Occupational Therapy. Thank you very much for this consultation, will continue to follow. Please call with any additional questions.       Jennifer Soria MD

## 2022-01-30 NOTE — PROGRESS NOTES
-Please complete MRI History and Safety Screening Form   - Patient cannot be scanned until this form is completed, including signatures, and reviewed in MRI to ensure patient is SAFE and eligible for MRI. - CALL MRI when this has been successfully completed at [phone].

## 2022-01-30 NOTE — PROGRESS NOTES
End of Shift Note    Bedside shift change report given to Horacio Banks (oncoming nurse) by Jaja Hill RN (offgoing nurse).   Report included the following information SBAR, Kardex, ED Summary, Procedure Summary, Intake/Output and MAR    Shift worked:  5213-7252     Shift summary and any significant changes:     Pt had a difficult night stating she really needs to be placed soemwhere     Concerns for physician to address:  ***     Zone phone for oncoming shift:   ***         Jaja Hill RN

## 2022-01-31 ENCOUNTER — APPOINTMENT (OUTPATIENT)
Dept: MRI IMAGING | Age: 42
DRG: 321 | End: 2022-01-31
Attending: PSYCHIATRY & NEUROLOGY
Payer: MEDICAID

## 2022-01-31 ENCOUNTER — APPOINTMENT (OUTPATIENT)
Dept: GENERAL RADIOLOGY | Age: 42
DRG: 321 | End: 2022-01-31
Attending: NURSE PRACTITIONER
Payer: MEDICAID

## 2022-01-31 PROBLEM — M54.12 CERVICAL RADICULOPATHY: Status: ACTIVE | Noted: 2022-01-31

## 2022-01-31 PROBLEM — M25.561 RIGHT KNEE PAIN: Status: ACTIVE | Noted: 2022-01-31

## 2022-01-31 PROBLEM — R26.2 INABILITY TO AMBULATE DUE TO MULTIPLE JOINTS: Status: ACTIVE | Noted: 2022-01-31

## 2022-01-31 LAB
EST. AVERAGE GLUCOSE BLD GHB EST-MCNC: 114 MG/DL
HBA1C MFR BLD: 5.6 % (ref 4–5.6)
SARS-COV-2, COV2: NORMAL
TSH SERPL DL<=0.05 MIU/L-ACNC: 11.3 UIU/ML (ref 0.36–3.74)

## 2022-01-31 PROCEDURE — 74011250636 HC RX REV CODE- 250/636: Performed by: INTERNAL MEDICINE

## 2022-01-31 PROCEDURE — 77030038269 HC DRN EXT URIN PURWCK BARD -A

## 2022-01-31 PROCEDURE — 72158 MRI LUMBAR SPINE W/O & W/DYE: CPT

## 2022-01-31 PROCEDURE — A9576 INJ PROHANCE MULTIPACK: HCPCS | Performed by: INTERNAL MEDICINE

## 2022-01-31 PROCEDURE — 71045 X-RAY EXAM CHEST 1 VIEW: CPT

## 2022-01-31 PROCEDURE — 65270000015 HC RM PRIVATE ONCOLOGY

## 2022-01-31 PROCEDURE — 74011250637 HC RX REV CODE- 250/637: Performed by: INTERNAL MEDICINE

## 2022-01-31 PROCEDURE — U0005 INFEC AGEN DETEC AMPLI PROBE: HCPCS

## 2022-01-31 PROCEDURE — G0378 HOSPITAL OBSERVATION PER HR: HCPCS

## 2022-01-31 PROCEDURE — 72156 MRI NECK SPINE W/O & W/DYE: CPT

## 2022-01-31 PROCEDURE — 83036 HEMOGLOBIN GLYCOSYLATED A1C: CPT

## 2022-01-31 PROCEDURE — 74011000250 HC RX REV CODE- 250: Performed by: INTERNAL MEDICINE

## 2022-01-31 PROCEDURE — 84443 ASSAY THYROID STIM HORMONE: CPT

## 2022-01-31 RX ORDER — LORAZEPAM 2 MG/ML
1 INJECTION INTRAMUSCULAR
Status: DISPENSED | OUTPATIENT
Start: 2022-01-31 | End: 2022-02-01

## 2022-01-31 RX ORDER — GUAIFENESIN/DEXTROMETHORPHAN 100-10MG/5
10 SYRUP ORAL
Status: DISCONTINUED | OUTPATIENT
Start: 2022-01-31 | End: 2022-02-13 | Stop reason: HOSPADM

## 2022-01-31 RX ORDER — LEVOTHYROXINE SODIUM 50 UG/1
50 TABLET ORAL
Status: DISCONTINUED | OUTPATIENT
Start: 2022-01-31 | End: 2022-02-11

## 2022-01-31 RX ORDER — DIPHENHYDRAMINE HYDROCHLORIDE 50 MG/ML
25 INJECTION, SOLUTION INTRAMUSCULAR; INTRAVENOUS ONCE
Status: COMPLETED | OUTPATIENT
Start: 2022-01-31 | End: 2022-01-31

## 2022-01-31 RX ORDER — LORAZEPAM 2 MG/ML
1 INJECTION INTRAMUSCULAR ONCE
Status: COMPLETED | OUTPATIENT
Start: 2022-01-31 | End: 2022-01-31

## 2022-01-31 RX ADMIN — ENOXAPARIN SODIUM 40 MG: 100 INJECTION SUBCUTANEOUS at 08:54

## 2022-01-31 RX ADMIN — LORAZEPAM 1 MG: 2 INJECTION INTRAMUSCULAR; INTRAVENOUS at 14:28

## 2022-01-31 RX ADMIN — MELOXICAM 7.5 MG: 7.5 TABLET ORAL at 08:57

## 2022-01-31 RX ADMIN — SODIUM CHLORIDE, PRESERVATIVE FREE 10 ML: 5 INJECTION INTRAVENOUS at 14:32

## 2022-01-31 RX ADMIN — GADOTERIDOL 20 ML: 279.3 INJECTION, SOLUTION INTRAVENOUS at 15:39

## 2022-01-31 RX ADMIN — LEVOTHYROXINE SODIUM 50 MCG: 0.05 TABLET ORAL at 08:55

## 2022-01-31 RX ADMIN — SODIUM CHLORIDE, PRESERVATIVE FREE 10 ML: 5 INJECTION INTRAVENOUS at 01:10

## 2022-01-31 RX ADMIN — SODIUM CHLORIDE, PRESERVATIVE FREE 10 ML: 5 INJECTION INTRAVENOUS at 06:34

## 2022-01-31 RX ADMIN — DIPHENHYDRAMINE HYDROCHLORIDE 25 MG: 50 INJECTION, SOLUTION INTRAMUSCULAR; INTRAVENOUS at 14:27

## 2022-01-31 RX ADMIN — SODIUM CHLORIDE, PRESERVATIVE FREE 10 ML: 5 INJECTION INTRAVENOUS at 22:40

## 2022-01-31 NOTE — PROGRESS NOTES
End of Shift Note    Bedside shift change report given to Erin DUNLAP (oncoming nurse) by Shimon Warren RN (offgoing nurse). Report included the following information SBAR, Kardex, ED Summary, Intake/Output, MAR, Recent Results and Cardiac Rhythm NSR    Shift worked:  7568-9632     Shift summary and any significant changes:     Patient demanding. Right knee with mild swelling. Patient no c/o pain. Patient vitals WNL. Patient tolerating PO and voiding Inez urine QS. Right knee swelling. Patient coughing. IS given and guaifenesin ordered. Concerns for physician to address:  see above     Zone phone for oncoming shift:  [phone]     Activity:  Activity Level: Bed Rest  Number times ambulated in hallways past shift: 0  Number of times OOB to chair past shift: 0    Cardiac:   Cardiac Monitoring: No           Access:   Current line(s): PIV     Genitourinary:   Urinary status: incontinent and external catheter    Respiratory:   O2 Device: None (Room air)  Chronic home O2 use?: NO  Incentive spirometer at bedside:YES     GI:  Last Bowel Movement Date: 01/23/22  Current diet:  ADULT DIET Regular  Passing flatus: YES  Tolerating current diet: YES       Pain Management:   Patient states pain is manageable on current regimen: YES    Skin:  Tru Score: 15  Interventions: float heels, PT/OT consult and internal/external urinary devices    Patient Safety:  Fall Score:  Total Score: 7  Interventions: bed/chair alarm, assistive device (walker, cane, etc), gripper socks and pt to call before getting OOB  High Fall Risk: Yes    Length of Stay:  Expected LOS: - - -  Actual LOS: 0      Rose Santamaria RN

## 2022-01-31 NOTE — PROGRESS NOTES
Occupational Therapy  Chart reviewed; cleared for tx; Patient agreeable to tx but transport arrived to take patient to MRI. Patient expresses fear of falling recommend A x 2 for a;; therapy tx sessions as able. Also reports no BM in 5 days. Will retry later as able.  Jaz Robertson OTR/L

## 2022-01-31 NOTE — PROGRESS NOTES
Transition of Care Plan:    RUR: 12%  Disposition: IPR-Referrals sent on 1/29/22 to facilities: acceptance pending and insurance auth required   Follow up appointments: Follow up with PCP and/or Specialist   DME needed: Facility will provide   Transportation at [address] required   [address] or means to access home:    N/A    IM Medicare Letter: N/A  Is patient a BCPI-A Bundle:  CM will provide if required          If yes, was Bundle Letter given?:    Is patient a  and connected with the [place]? N/A               If yes, was [employer] transfer form completed and [facility] notified? Caregiver Contact: Patti Davis (mother) [phone]  Discharge Caregiver contacted prior to discharge? Family to be contacted  Care Conference needed?:  Not at this time    CM: Kayla Carpenter is currently working with pt in the [facility]. Pt is being recommended for IPR at the time of d/c.  CM informed that referrals to be sent to IPR: [facility], [facility], and [facility]. CM reviewed referrals sent for IPR on 1/29/22 and acceptance is currently pending, due to pt's medical stability and home dynamics. [facility], pending pt's medially stability-CM informed admin coordinator to move forward with acceptance to facility. PT WILL REQUIRE INSURANCE AUTH, UPON ACCEPTANCE-INSURANCE AUTH CAN TAKE 24-48HRS, TO APPROVE. PT WILL REQUIRE PCR COVID TEST, UPON D/C. CM staffed case with physician and was informed that MRI is currently pending for pt at this time. Pt was unable to receive MRI due to claustrophobia. MRI will resume at a later time/date. CM will continue to follow.     Kayla Carpenter, RANCHO, [facility]

## 2022-01-31 NOTE — PROGRESS NOTES
Hospitalist Progress Note    NAME: Delmer Egan   :  1980   MRN:  [id number]       Assessment / Plan:  Suspected Cervical/Lumbar Radiculopathy POA  Right knee pain POA  Causing Recurrent falls at home POA- per pt causes buckling  Ambulatory dysfunction due to above  History of arthritis  H/o L ankle Fracture s/p ORIF at [facility] ortho  Hypothyroidism (newly diagnosed) POA  Morbid obesity POA  Tobacco abuse     Pt was under observation stay- changed to IP today after 2 MN stay - failed outpatient management/treatment  C Mobic per recommendations of Orthopedic team- cont daily scheduled  Cont PO oxycodone prn severe pain only- avoid as able  Cont falls precautions  IP PT OT consult noted-- recommends IPR  IP  consulted- working on [employer] placement  IP Ortho consult appreciated-   - Generalized bony knee pain due to overload - NO KNEE intervention recommended  -  Wt loss through dietary changes   -  Considering adding once a day NSAID- Celebrex or mobic  - Rehab for upper and lower body strengthening  - AFO if R foot drop persists  - IP Neurology consulted for recommendations on R Foot drop- noted, MRI C & L spine awaited  Check HbA1c- pending  TSH = 11.3, started on Synthroid daily today, Outpatient TSH as outpatient in 3 weeks for further dose titration  Offered nicotine patch on admission- pt has declined at this time, give if pt wants  Patient counseled about weight loss- discussed bariatric surgery options, pt not motivated for it but understands the weight is the crux of all her problems     Code Status: Full code  Surrogate Decision Maker:  Mother 79 yrs old at home who cant help physically     DVT Prophylaxis: Lovenox  GI Prophylaxis: not indicated     Baseline: Ambulatory    Estimated discharge date:   Barriers: IPRehab placement    Recommended Disposition: [facility]/Rehab being looked into     Subjective:     Chief Complaint / Reason for Physician Visit: F/U R Knee pain/Buckling, Recurrent falls at home, Obesity, inability to ambulate  \"I am ok\". Discussed with RN events overnight. Review of Systems:  Symptom Y/N Comments  Symptom Y/N Comments   Fever/Chills n   Chest Pain n    Poor Appetite n   Edema n    Cough n   Abdominal Pain n    Sputum n   Joint Pain y R Knee   SOB/BECKETT n   Pruritis/Rash n    Nausea/vomit n   Tolerating PT/OT y    Diarrhea    Tolerating Diet y    Constipation    Other       Could NOT obtain due to:      Objective:     VITALS:   Last 24hrs VS reviewed since prior progress note. Most recent are:  Patient Vitals for the past 24 hrs:   Temp Pulse Resp BP SpO2   01/31/22 0735 97.7 °F (36.5 °C) 91 18 132/79 97 %   01/30/22 2330 98.4 °F (36.9 °C) 91 18 (!) 124/57 95 %   01/30/22 1930 98.2 °F (36.8 °C) 97 18 117/65 97 %   01/30/22 1531 98.3 °F (36.8 °C) 98 19 107/65 95 %   01/30/22 0848 98.8 °F (37.1 °C) (!) 110 21 (!) 139/94 94 %       Intake/Output Summary (Last 24 hours) at 1/31/2022 0809  Last data filed at 1/31/2022 5826  Gross per 24 hour   Intake 700 ml   Output 550 ml   Net 150 ml        I had a face to face encounter and independently examined this patient on 1/31/2022, as outlined below:  PHYSICAL EXAM:  General: WD, WN. Alert, cooperative, no acute distress, Morbid Obese +    EENT:  EOMI. Anicteric sclerae. MMM  Resp:  CTA bilaterally, no wheezing or rales. No accessory muscle use  CV:  Regular  rhythm,  No edema  GI:  Soft, Non distended, Non tender. +Bowel sounds  Neurologic:  Alert and oriented X 3, normal speech,   Psych:   Good insight. Not anxious nor agitated  Skin:  No rashes.   No jaundice    Reviewed most current lab test results and cultures  YES  Reviewed most current radiology test results   YES  Review and summation of old records today    NO  Reviewed patient's current orders and MAR    YES  PMH/SH reviewed - no change compared to H&P  ________________________________________________________________________  Care Plan discussed with:    Comments Patient x    Family      RN x    Care Manager x    Consultant                        Multidiciplinary team rounds were held today with , nursing, pharmacist and clinical coordinator. Patient's plan of care was discussed; medications were reviewed and discharge planning was addressed. ________________________________________________________________________  Total NON critical care TIME:  36   Minutes    Total CRITICAL CARE TIME Spent:   Minutes non procedure based      Comments   >50% of visit spent in counseling and coordination of care     ________________________________________________________________________  Viola Schuler MD     Procedures: see electronic medical records for all procedures/Xrays and details which were not copied into this note but were reviewed prior to creation of Plan. LABS:  I reviewed today's most current labs and imaging studies.   Pertinent labs include:  Recent Labs     01/29/22 0045   WBC 8.3   HGB 13.6   HCT 41.4        Recent Labs     01/29/22 0045      K 4.4      CO2 24   GLU 91   BUN 17   CREA 1.00   CA 8.5       Signed: Viola Schuler MD

## 2022-01-31 NOTE — PROGRESS NOTES
Problem: Pressure Injury - Risk of  Goal: *Prevention of pressure injury  Description: Document Tru Scale and appropriate interventions in the flowsheet. Outcome: Progressing Towards Goal  Note: Pressure Injury Interventions:  Sensory Interventions: Assess changes in LOC,Assess need for specialty bed,Chair cushion,Check visual cues for pain,Discuss PT/OT consult with provider,Float heels,Keep linens dry and wrinkle-free,Maintain/enhance activity level,Minimize linen layers,Monitor skin under medical devices    Moisture Interventions: Absorbent underpads,Apply protective barrier, creams and emollients,Assess need for specialty bed,Check for incontinence Q2 hours and as needed,Internal/External urinary devices,Minimize layers    Activity Interventions: Chair cushion,Increase time out of bed,Pressure redistribution bed/mattress(bed type)    Mobility Interventions: Chair cushion,Float heels,HOB 30 degrees or less,Pressure redistribution bed/mattress (bed type),PT/OT evaluation,Turn and reposition approx. every two hours(pillow and wedges)    Nutrition Interventions: Document food/fluid/supplement intake    Friction and Shear Interventions: Feet elevated on foot rest,Foam dressings/transparent film/skin sealants,HOB 30 degrees or less,Lift sheet,Minimize layers                Problem: Falls - Risk of  Goal: *Absence of Falls  Description: Document Leslee Fall Risk and appropriate interventions in the flowsheet.   Outcome: Progressing Towards Goal  Note: Fall Risk Interventions:  Mobility Interventions: Communicate number of staff needed for ambulation/transfer,OT consult for ADLs,Patient to call before getting OOB,PT Consult for mobility concerns,PT Consult for assist device competence,Utilize walker, cane, or other assistive device,Utilize gait belt for transfers/ambulation    Mentation Interventions: Bed/chair exit alarm,Door open when patient unattended,Evaluate medications/consider consulting pharmacy,Familiar objects from home,More frequent rounding,Reorient patient,Room close to nurse's station,Toileting rounds    Medication Interventions: Bed/chair exit alarm,Patient to call before getting OOB,Teach patient to arise slowly    Elimination Interventions: Bed/chair exit alarm,Call light in reach,Patient to call for help with toileting needs,Toileting schedule/hourly rounds    History of Falls Interventions: Bed/chair exit alarm,Door open when patient unattended,Room close to nurse's station,Assess for delayed presentation/identification of injury for 48 hrs (comment for end date)         Problem: Patient Education: Go to Patient Education Activity  Goal: Patient/Family Education  Outcome: Progressing Towards Goal

## 2022-01-31 NOTE — PROGRESS NOTES
End of Shift Note    Bedside shift change report given to Karissa Kinney RN (oncoming nurse) by Christy Villalba, RN (offgoing nurse). Report included the following information SBAR, Kardex and MAR    Shift worked:  7a - 7p     Shift summary and any significant changes:     Pt's bed controls not working. Transferred pt to another bed. Pt denied having pain once in new bed. Swallows pills whole. Concerns for physician to address:       Zone phone for oncoming shift:          Activity:  Activity Level: Bed Rest  Number times ambulated in hallways past shift: 0  Number of times OOB to chair past shift: 0    Cardiac:   Cardiac Monitoring: No           Access:   Current line(s): PIV     Genitourinary:   Urinary status: voiding, incontinent and external catheter    Respiratory:   O2 Device: None (Room air)  Chronic home O2 use?: NO  Incentive spirometer at bedside: NO     GI:  Last Bowel Movement Date: 01/23/22  Current diet:  ADULT DIET Regular  Passing flatus: YES  Tolerating current diet: YES       Pain Management:   Patient states pain is manageable on current regimen: YES    Skin:  Tru Score: 15  Interventions: turn team, float heels, increase time out of bed, PT/OT consult, limit briefs, internal/external urinary devices and nutritional support     Patient Safety:  Fall Score:  Total Score: 4  Interventions: bed/chair alarm, gripper socks, pt to call before getting OOB and stay with me (per policy)  High Fall Risk: Yes    Length of Stay:  Expected LOS: - - -  Actual LOS: 0      Christy Villalba, GERMÁN

## 2022-01-31 NOTE — PROGRESS NOTES
.End of Shift Note    Bedside shift change report given to  (oncoming nurse) by Michelle Hackett RN (offgoing nurse). Report included the following information SBAR, Procedure Summary, Intake/Output, MAR and Med Rec Status    Shift worked:  7 a - 7 p     Shift summary and any significant changes:     Patient had her MRI done today with a dose of ativan IV and Benadryl IV to calm her nerves for procedure. Patient returned to room and doing well. Concerns for physician to address:       Zone phone for oncoming shift:   [phone]       Activity:  Activity Level: Bed Rest  Number times ambulated in hallways past shift: 0  Number of times OOB to chair past shift: 0    Cardiac:   Cardiac Monitoring: No           Access:   Current line(s): PIV     Genitourinary:   Urinary status: external catheter    Respiratory:   O2 Device: None (Room air)  Chronic home O2 use?: NO  Incentive spirometer at bedside: NO     GI:  Last Bowel Movement Date: 01/31/22  Current diet:  ADULT DIET Regular  Passing flatus: YES  Tolerating current diet: YES       Pain Management:   Patient states pain is manageable on current regimen: YES    Skin:  Tru Score: 15  Interventions: PT/OT consult    Patient Safety:  Fall Score:  Total Score: 7  Interventions: gripper socks  High Fall Risk: Yes    Length of Stay:  Expected LOS: 2d 14h  Actual LOS: Alcon España RN

## 2022-01-31 NOTE — PROGRESS NOTES
Physical Therapy Note:  Chart reviewed in preparation for intervention. Patient currently MALAIKA for MRI. Will defer and follow up next treatment day.   Thank you,  Kamala Simeon, PT, DPT

## 2022-01-31 NOTE — PROGRESS NOTES
Problem: Falls - Risk of  Goal: *Absence of Falls  Description: Document Magali Embs Fall Risk and appropriate interventions in the flowsheet. Outcome: Progressing Towards Goal  Note: Fall Risk Interventions:  Mobility Interventions: Bed/chair exit alarm         Medication Interventions: Bed/chair exit alarm    Elimination Interventions: Call light in reach    History of Falls Interventions:  Investigate reason for fall

## 2022-01-31 NOTE — PROGRESS NOTES
Attempted MRI, pt claustrophobic, unable to tolerate scan. Reached out to patient's RN, she will try to get meds ordered so that patient can be reattempted later.

## 2022-01-31 NOTE — PROGRESS NOTES
Received message from patient's nurse stating:    Patient coughing with yellow mucus. Lungs course. Patient smoker. IS given. Request something for cough         Discussion / orders:     Entered order for Robitussin 10 mL every 6 hours as needed for cough or congestion   Chest x-ray           Please note that this note was dictated using Dragon computer voice recognition software. Quite often unanticipated grammatical, syntax, homophones, and other interpretive errors are inadvertently transcribed by the computer software. Please disregard these errors. Please excuse any errors that have escaped final proofreading.      Signed by:  Jaswinder Headr DNP, ACNP-BC

## 2022-01-31 NOTE — PROGRESS NOTES
Patient coughing with yellow mucus. Lungs course. Patient smoker. IS given. Purveyor notified and Request something for cough.

## 2022-02-01 ENCOUNTER — APPOINTMENT (OUTPATIENT)
Dept: ULTRASOUND IMAGING | Age: 42
DRG: 321 | End: 2022-02-01
Attending: NURSE PRACTITIONER
Payer: MEDICAID

## 2022-02-01 ENCOUNTER — APPOINTMENT (OUTPATIENT)
Dept: GENERAL RADIOLOGY | Age: 42
DRG: 321 | End: 2022-02-01
Attending: NURSE PRACTITIONER
Payer: MEDICAID

## 2022-02-01 LAB
SARS-COV-2, XPLCVT: NOT DETECTED
SOURCE, COVRS: NORMAL

## 2022-02-01 PROCEDURE — 73590 X-RAY EXAM OF LOWER LEG: CPT

## 2022-02-01 PROCEDURE — 97535 SELF CARE MNGMENT TRAINING: CPT

## 2022-02-01 PROCEDURE — 73560 X-RAY EXAM OF KNEE 1 OR 2: CPT

## 2022-02-01 PROCEDURE — 74011250636 HC RX REV CODE- 250/636: Performed by: INTERNAL MEDICINE

## 2022-02-01 PROCEDURE — 97530 THERAPEUTIC ACTIVITIES: CPT

## 2022-02-01 PROCEDURE — 74011250637 HC RX REV CODE- 250/637: Performed by: INTERNAL MEDICINE

## 2022-02-01 PROCEDURE — 93971 EXTREMITY STUDY: CPT

## 2022-02-01 PROCEDURE — 74011000250 HC RX REV CODE- 250: Performed by: INTERNAL MEDICINE

## 2022-02-01 PROCEDURE — 65270000015 HC RM PRIVATE ONCOLOGY

## 2022-02-01 RX ADMIN — LEVOTHYROXINE SODIUM 50 MCG: 0.05 TABLET ORAL at 06:24

## 2022-02-01 RX ADMIN — SODIUM CHLORIDE, PRESERVATIVE FREE 10 ML: 5 INJECTION INTRAVENOUS at 21:48

## 2022-02-01 RX ADMIN — MELOXICAM 7.5 MG: 7.5 TABLET ORAL at 09:39

## 2022-02-01 RX ADMIN — ENOXAPARIN SODIUM 40 MG: 100 INJECTION SUBCUTANEOUS at 09:39

## 2022-02-01 NOTE — PROGRESS NOTES
Bedside shift change report given to Av Jeffries RN   (oncoming nurse) by Cathy Richardson RN (offgoing nurse).   Report included the following information SBAR, Procedure Summary, Intake/Output, MAR and Med Rec Status

## 2022-02-01 NOTE — CONSULTS
Full consult to follow  Briefly, 40yo with progressively myelopathy, right sided weakness. MRI shows severe cervical stenosis.   Plan for OR Thurs am for ACDF 3/4, 4/5, 5/6

## 2022-02-01 NOTE — PROGRESS NOTES
End of Shift Note    Bedside shift change report given to Vickie Spurling (oncoming nurse) by Michael Hartley RN (offgoing nurse).   Report included the following information SBAR, Kardex, ED Summary, Intake/Output, MAR and Recent Results    Shift worked:  [id number]     Shift summary and any significant changes:    Pt relaxed in bed, pt cleaned up several times   Concerns for physician to address: none   Zone phone for oncoming shift:  none         Michael Hartley RN

## 2022-02-01 NOTE — PROGRESS NOTES
Problem: Self Care Deficits Care Plan (Adult)  Goal: *Acute Goals and Plan of Care (Insert Text)  Description: FUNCTIONAL STATUS PRIOR TO ADMISSION: Lives with her mother, who is able to care for herself, but can not take care of the patient. History of frequent falls due to her R knee \"giving out\". Sleeping on the sofa. She indicates that the can not use a RW in the house \"because there is not enough room for it. \" She reports having difficulty getting dressed; likely does not get dressed every day. HOME SUPPORT: Mother    Occupational Therapy Goals  Initiated 1/29/2022  1. Patient will perform grooming standing up to 2 minutes with contact guard assist within 7 day(s). 2.  Patient will perform lower body dressing with minimal assistance using AE as needed within 7 day(s). 3.  Patient will perform toilet transfers with supervision/SBA within 7 day(s). 4.  Patient will perform all aspects of toileting with minimal assistance within 7 day(s). 5.  Patient will participate in upper extremity therapeutic exercise/activities with Min cues for 10 minutes within 7 day(s). Outcome: Progressing Towards Goal   OCCUPATIONAL THERAPY TREATMENT  Patient: Belinda Brown (66 y.o. female)  Date: 2/1/2022  Diagnosis: Falls [W19. XXXA]  Inability to ambulate due to multiple joints [R26.2]  Cervical radiculopathy [M54.12]  Right knee pain [M25.561] <principal problem not specified>  Procedure(s) (LRB):  C3,4 C4,5 C5,6 ANTERIOR CERVICAL DISCECTOMY AND  FUSION (URGENT) (N/A)    Precautions: Fall  Chart, occupational therapy assessment, plan of care, and goals were reviewed. ASSESSMENT  Patient continues with skilled OT services and is progressing slowly towards goals. Appeared slightly less anxious with activity; able to focus on tasks; patient not willing to stand/transfer to chair or BSC but willing to work at edge of bed. No pain reported in sitting.  Incontinent B&B/diarrhea after marked effort for BM after several days waiting. VSS in sitting but she does report feeling dizzy with CGA needed at times for balance when distracted by UE tasks. A x 2 bed mobility/supine to sit. Current Level of Function Impacting Discharge (ADLs): set up-max A  UE ADLs \"I didn't brush my teeth for a long time because I can't stand up the whole time. \"  Hands unusually soiled; says she has not worked for 2 yrs since ankle fx; D LE ADLs- will benefit from LE AE training, mod/max A bed mobility; declined out of bed activity    Other factors to consider for discharge: Patients hands/fingernails appear heavily soiled; may be infection control risk factor mohamud w/pending surgery         PLAN :  Patient continues to benefit from skilled intervention to address the above impairments. Continue treatment per established plan of care to address goals. Recommend with staff: offer chair position for meals    Recommend next OT session: B UE AROM HEP- cervical precautions handout/pre-op training if that is neuro recommendation/6 pack hand exercises    Recommendation for discharge: (in order for the patient to meet his/her long term goals)  Therapy 3 hours per day 5-7 days per week    This discharge recommendation:  Has been made in collaboration with the attending provider and/or case management    IF patient discharges home will need the following DME: AE: long handled bathing, AE: long handled dressing, AE: feeding, bedside commode, transfer bench, walker: rolling, and wheelchair       SUBJECTIVE:   Patient stated I can'feel anything so I cant clean down there very well.  ([facility])    OBJECTIVE DATA SUMMARY:   Cognitive/Behavioral Status:  Neurologic State: Alert; Appropriate for age  Orientation Level: Oriented X4  Cognition: Follows commands; Impulsive (receptive to initiation of potential post op cervical precau)  Perception: Appears intact  Perseveration: Perseverates during ADLS  Safety/Judgement: Awareness of environment; Fall prevention; Insight into deficits; Decreased insight into deficits (learning about deficits; anxious re deficits)    Functional Mobility and Transfers for ADLs:  Bed Mobility:  Rolling: Moderate assistance  Supine to Sit: Moderate assistance;Assist x2; Additional time; Adaptive equipment;Bed Modified  Sit to Supine: Maximum assistance (A B LEs and sencond person at back)  Scooting: Moderate assistance;Maximum assistance; Additional time;Assist x2;Adaptive equipment    Transfers:  Sit to Stand:  (refused)  Functional Transfers  Bathroom Mobility:  (she reports she plans to decline until post op)  Toilet Transfer :  ([address] in room when ready to use; recommend A x 2)  Bed to Chair:  (refused during session; asked at end of session for w/c)    Balance:  Sitting: Impaired  Sitting - Static: Good (unsupported); Fair (occasional)  Sitting - Dynamic: Good (unsupported); Fair (occasional)  Standing: Impaired (refused standing \" I am afraid I will fall\")    ADL Intervention:  Feeding  Feeding Assistance: Modified independent; Set-up (assist w/ containers PRN)    Grooming  Grooming Assistance: Minimum assistance (decreased coordination with use of comb etc)         Lower Body Bathing  Perineal  : Total assistance (dependent) (\"I cant feel my hands so I cant do it good\")  Position Performed:  (long sitting/semi fowlers)         Lower Body Dressing Assistance  Dressing Assistance: Total assistance(dependent)    Toileting  Toileting Assistance: Total assistance(dependent) (incontinent B&B; finally having BM \"after 1 week\")    Cognitive Retraining  Maintains Attention For (Time): Greater than 10 minutes  Safety/Judgement: Awareness of environment; Fall prevention; Insight into deficits; Decreased insight into deficits (learning about deficits; anxious re deficits)        Pain:  No pain reported this session in sitting    Activity Tolerance:   Fair, requires frequent rest breaks, and VSS post/during sitting ADLs    After treatment patient left in no apparent distress:   Call bell within reach, Side rails x 3, and semi fowlers; declined lunch    COMMUNICATION/COLLABORATION:   The patients plan of care was discussed with: Physical therapist and Registered nurse.      Ramirez Orlando OTR/L  Time Calculation: 46 mins

## 2022-02-01 NOTE — PROGRESS NOTES
Hospitalist Progress Note    NAME: Ascencion Dozier   :  1980   MRN:  [id number]       Assessment / Plan:  Severe Cervical Spinal stenosis with Radiculopathy POA- MRI +ve for findings ()  Right knee pain POA  Causing Recurrent falls at home POA- per pt causes buckling  Ambulatory dysfunction due to above  History of arthritis  H/o L ankle Fracture s/p ORIF at [facility] ortho  Hypothyroidism (newly diagnosed) POA  Morbid obesity POA  Tobacco abuse   MRI C Spine=  1. Disc protrusions C4-C5 and C5-C6, with severe canal stenosis and cord  flattening. 2. Probable abnormal cord signal C4-C5. Possible abnormal cord signal C5-C6. 3. Moderate canal stenosis C3-C4. 4. Neural foraminal stenoses: bilateral C3-C4, right and severe left C4-C5, left  C5-C6.     Pt was under observation stay- changed to IP  after 2 [place] stay - failed outpatient management/treatment  Started on Mobic per recommendations of Orthopedic team- cont daily scheduled  Cont PO oxycodone prn severe pain only- avoid as able  Cont falls precautions  IP PT OT consult noted-- recommends IPR  IP  consulted- working on [employer] placement  IP Ortho consult appreciated-   - Generalized bony knee pain due to overload - NO KNEE intervention recommended  -  Wt loss through dietary changes   -  Considering adding once a day NSAID- Celebrex or mobic  - Rehab for upper and lower body strengthening  - AFO if R foot drop persists  - IP Neurology consulted for recommendations on R Foot drop- noted, MRI C & L spine ordered-- will await input on findings today  Will get IP Neurosurgery evaluation for Abnormal C Spine MRI  HbA1c- 5.6  TSH = 11.3, started on Synthroid daily, Outpatient TSH as outpatient in 3 weeks for further dose titration  Offered nicotine patch on admission- pt has declined at this time, give if pt wants  Patient counseled about weight loss- discussed bariatric surgery options, pt not motivated for it but understands the weight is the crux of all her problems     Code Status: Full code  Surrogate Decision Maker: Mother 79 yrs old at home who cant help physically     DVT Prophylaxis: Lovenox  GI Prophylaxis: not indicated     Baseline: Ambulatory    Estimated discharge date: February 2? Barriers: IPRehab placement once cleared by Neuro surgery    Recommended Disposition: SAH/Rehab being looked into  PCR covid test (1/31) pending for placement     Subjective:     Chief Complaint / Reason for Physician Visit: F/U R Knee pain/Buckling, Recurrent falls at home, Obesity, inability to ambulate  \"I am ok\". Discussed with RN events overnight. Review of Systems:  Symptom Y/N Comments  Symptom Y/N Comments   Fever/Chills n   Chest Pain n    Poor Appetite n   Edema n    Cough n   Abdominal Pain n    Sputum n   Joint Pain y R Knee   SOB/BECKETT n   Pruritis/Rash n    Nausea/vomit n   Tolerating PT/OT y    Diarrhea    Tolerating Diet y    Constipation    Other       Could NOT obtain due to:      Objective:     VITALS:   Last 24hrs VS reviewed since prior progress note. Most recent are:  Patient Vitals for the past 24 hrs:   Temp Pulse Resp BP SpO2   02/01/22 0802 98.2 °F (36.8 °C) 92 20 118/65 93 %   01/31/22 2358 97.8 °F (36.6 °C) 93 20 116/62 98 %   01/31/22 1955 97.9 °F (36.6 °C) 90 19 (!) 119/90 98 %   01/31/22 1612 98.8 °F (37.1 °C) 95 18 119/67 97 %       Intake/Output Summary (Last 24 hours) at 2/1/2022 0956  Last data filed at 2/1/2022 0369  Gross per 24 hour   Intake    Output 750 ml   Net -750 ml        I had a face to face encounter and independently examined this patient on 2/1/2022, as outlined below:  PHYSICAL EXAM:  General: WD, WN. Alert, cooperative, no acute distress, Morbid Obese +    EENT:  EOMI. Anicteric sclerae. MMM  Resp:  CTA bilaterally, no wheezing or rales. No accessory muscle use  CV:  Regular  rhythm,  No edema  GI:  Soft, Non distended, Non tender.   +Bowel sounds  Neurologic:  Alert and oriented X 3, normal speech,   Psych: Good insight. Not anxious nor agitated  Skin:  No rashes. No jaundice    Reviewed most current lab test results and cultures  YES  Reviewed most current radiology test results   YES  Review and summation of old records today    NO  Reviewed patient's current orders and MAR    YES  PMH/SH reviewed - no change compared to H&P  ________________________________________________________________________  Care Plan discussed with:    Comments   Patient x    Family      RN x    Care Manager x Christie   Consultant                        Multidiciplinary team rounds were held today with , nursing, pharmacist and clinical coordinator. Patient's plan of care was discussed; medications were reviewed and discharge planning was addressed. ________________________________________________________________________  Total NON critical care TIME:  36   Minutes    Total CRITICAL CARE TIME Spent:   Minutes non procedure based      Comments   >50% of visit spent in counseling and coordination of care     ________________________________________________________________________  Jhonathan Veronica MD     Procedures: see electronic medical records for all procedures/Xrays and details which were not copied into this note but were reviewed prior to creation of Plan. LABS:  I reviewed today's most current labs and imaging studies. Pertinent labs include:  No results for input(s): WBC, HGB, HCT, PLT, HGBEXT, HCTEXT, PLTEXT, HGBEXT, HCTEXT, PLTEXT in the last 72 hours. No results for input(s): NA, K, CL, CO2, GLU, BUN, CREA, CA, MG, PHOS, ALB, TBIL, TBILI, ALT, INR, INREXT, INREXT in the last 72 hours.     No lab exists for component: SGOT    Signed: Jhonathan Veronica MD

## 2022-02-01 NOTE — PROGRESS NOTES
Occupational Therapy  Chart reviewed; cleared for tx; patient earlier declined due to bedpan use; now off unit; will retry later as able; Note MRI results and pending neuro consult.  Michael Novakr OTR/NABILA

## 2022-02-01 NOTE — PROGRESS NOTES
Transition of Care Plan:    RUR: 12%  Disposition: IPR-Referrals sent on 1/29/22 to facilities: acceptance pending and insurance auth required   Follow up appointments: Follow up with PCP and/or Specialist   DME needed: Facility will provide   Transportation at [address] required   Reche Crick or means to access home:    N/A    IM Medicare Letter: N/A  Is patient a BCPI-A Bundle:  CM will provide if required          If yes, was Bundle Letter given?:    Is patient a  and connected with the [place]? N/A               If yes, was [employer] transfer form completed and [facility] notified? Caregiver Contact: Shey Ho (mother) [phone]  Discharge Caregiver contacted prior to discharge? Family to be contacted  Care Conference needed?:  Not at this time    UPDATE: 10:38AM    CM received call from Giovanny, via telephone regarding CM voicemail. Diamante reported that pt will need updated clinicals from therapist to determine pt's baseline. CM will inform clinical team of the following and enter therapy notes to be reviewed by IPR liaison. CM will continue to follow. RANCHO Sarmiento, [place]        INITIAL NOTE: CM attempted to follow up with IPR liaison from [facility]: Diamante ([phone]) , to verify if pt clinicals were reviewed for placement. CM left voicemail for a return call. PT WILL REQUIRE INSURANCE AUTH, PRIOR TO ADMISSION.    PT WILL REQUIRE PCR COVID TEST AT THE TIME OF D/C    RANCHO Sarmiento, [address] [place] [address]

## 2022-02-01 NOTE — PROGRESS NOTES
Problem: Mobility Impaired (Adult and Pediatric)  Goal: *Acute Goals and Plan of Care (Insert Text)  Description: FUNCTIONAL STATUS PRIOR TO ADMISSION: recurrent falls due to R knee buckling; has difficulty getting off sofa where she sleeps; reports home is too cluttered to use a RW. HOME SUPPORT PRIOR TO ADMISSION: The patient lived with mother in 1 story home with 4 steps to enter and unstable rails. Mother assists patient with ADLs and meals. Physical Therapy Goals  Initiated 1/29/2022  1. Patient will move from supine to sit and sit to supine , scoot up and down, and roll side to side in bed with supervision/set-up within 7 day(s). 2.  Patient will transfer from bed to chair and chair to bed with minimal assistance/contact guard assist using the least restrictive device within 7 day(s). 3.  Patient will perform sit to stand with minimal assistance/contact guard assist within 7 day(s). 4.  Patient will ambulate with minimal assistance/contact guard assist for 35 feet with the least restrictive device within 7 day(s). 5.  Patient will ascend/descend 4 stairs with 1 handrail(s) with minimal assistance/contact guard assist within 7 day(s). 2/1/2022 1623 by Misty Duane, PT, DPT  Outcome: Progressing Towards Goal  2/1/2022 1509 by Misty Duane, PT, DPT  Outcome: Progressing Towards Goal   PHYSICAL THERAPY TREATMENT  Patient: Brandy Carter (17 y.o. female)  Date: 2/1/2022  Diagnosis: Falls [W19. XXXA]  Inability to ambulate due to multiple joints [R26.2]  Cervical radiculopathy [M54.12]  Right knee pain [M25.561] <principal problem not specified>  Procedure(s) (LRB):  C3,4 C4,5 C5,6 ANTERIOR CERVICAL DISCECTOMY AND  FUSION (URGENT) (N/A)    Precautions: Fall  Chart, physical therapy assessment, plan of care and goals were reviewed. ASSESSMENT  Patient continues with skilled PT services and is progressing towards goals.  Patient agreeable to sit EOB but immediately deferred any attempt to stand d/t fear of falling and preference to direct her own care. Education provided regarding back precautions post pending ACDF and discussed log roll technique for transfer to EOB. Concern for her ability to \"log roll\" post operatively and patient preferred to transfer to EOB from bed modified position with moderate assist and increased time. Patient unable to scoot forward to EOB and required maximum assist with cues for weight shifts to complete. Scooting to [facility] improved requiring moderate assist x2 following demonstration of technique. Patient noted to be incontinent of stool upon return to bed and completed rolling for mercy care. Challenged with rolling left > right d/t right LE weakness. The patient is far below her baseline and has had multiple recent falls. She is pending an ACDF 2/3/22 and will require reassessment following. Recommend discharge to IP rehab based on current presentation. Current Level of Function Impacting Discharge (mobility/balance): mod-max x2 for bed mobility; unable to stand             PLAN :  Patient continues to benefit from skilled intervention to address the above impairments. Continue treatment per established plan of care. to address goals. Recommendation for discharge: (in order for the patient to meet his/her long term goals)  Therapy 3 hours per day 5-7 days per week    This discharge recommendation:  Has been made in collaboration with the attending provider and/or case management    IF patient discharges home will need the following DME: to be determined (TBD)       SUBJECTIVE:   Patient stated I'm not standing. I know that I can't and I'm afraid that I will fall.     OBJECTIVE DATA SUMMARY:   Critical Behavior:  Neurologic State: Alert,Appropriate for age  Orientation Level: Oriented X4  Cognition: Follows commands,Impulsive (receptive to initiation of potential post op cervical precau)  Safety/Judgement: Awareness of environment,Fall prevention,Insight into deficits,Decreased insight into deficits (learning about deficits; anxious re deficits)  Functional Mobility Training:  Bed Mobility:  Rolling: Moderate assistance  Supine to Sit: Bed Modified; Additional time; Moderate assistance  Sit to Supine: Maximum assistance (A B LEs and sencond person at back)  Scooting: Moderate assistance;Maximum assistance; Additional time;Assist x2;Adaptive equipment        Transfers:  Sit to Stand:  (refused)           Bed to Chair:  (refused during session; asked at end of session for w/c)                    Balance:  Sitting: Impaired  Sitting - Static: Good (unsupported); Fair (occasional)  Sitting - Dynamic: Good (unsupported); Fair (occasional)  Standing:  ( )      Activity Tolerance:   Fair    After treatment patient left in no apparent distress:   Supine in bed and Call bell within reach    COMMUNICATION/COLLABORATION:   The patients plan of care was discussed with: Registered nurse.      Jazmin Christianson, PT, DPT   Time Calculation: 34 mins

## 2022-02-01 NOTE — PROGRESS NOTES
Problem: Falls - Risk of  Goal: *Absence of Falls  Description: Document Ashli Thompson Fall Risk and appropriate interventions in the flowsheet.   Outcome: Progressing Towards Goal  Note: Fall Risk Interventions:  Mobility Interventions: Communicate number of staff needed for ambulation/transfer    Mentation Interventions: Familiar objects from home    Medication Interventions: Teach patient to arise slowly    Elimination Interventions: Call light in reach,Patient to call for help with toileting needs    History of Falls Interventions: Bed/chair exit alarm

## 2022-02-02 ENCOUNTER — ANESTHESIA EVENT (OUTPATIENT)
Dept: SURGERY | Age: 42
DRG: 321 | End: 2022-02-02
Payer: MEDICAID

## 2022-02-02 ENCOUNTER — APPOINTMENT (OUTPATIENT)
Dept: CT IMAGING | Age: 42
DRG: 321 | End: 2022-02-02
Attending: NEUROLOGICAL SURGERY
Payer: MEDICAID

## 2022-02-02 LAB
ABO + RH BLD: NORMAL
APTT PPP: 24.9 SEC (ref 22.1–31)
BLOOD GROUP ANTIBODIES SERPL: NORMAL
INR PPP: 1 (ref 0.9–1.1)
PROTHROMBIN TIME: 10.8 SEC (ref 9–11.1)
SPECIMEN EXP DATE BLD: NORMAL
THERAPEUTIC RANGE,PTTT: NORMAL SECS (ref 58–77)

## 2022-02-02 PROCEDURE — 86900 BLOOD TYPING SEROLOGIC ABO: CPT

## 2022-02-02 PROCEDURE — 85730 THROMBOPLASTIN TIME PARTIAL: CPT

## 2022-02-02 PROCEDURE — 74011000250 HC RX REV CODE- 250: Performed by: INTERNAL MEDICINE

## 2022-02-02 PROCEDURE — 72125 CT NECK SPINE W/O DYE: CPT

## 2022-02-02 PROCEDURE — 65270000015 HC RM PRIVATE ONCOLOGY

## 2022-02-02 PROCEDURE — 97110 THERAPEUTIC EXERCISES: CPT

## 2022-02-02 PROCEDURE — 97530 THERAPEUTIC ACTIVITIES: CPT

## 2022-02-02 PROCEDURE — 85610 PROTHROMBIN TIME: CPT

## 2022-02-02 PROCEDURE — 74011250637 HC RX REV CODE- 250/637: Performed by: INTERNAL MEDICINE

## 2022-02-02 PROCEDURE — 36415 COLL VENOUS BLD VENIPUNCTURE: CPT

## 2022-02-02 RX ADMIN — SODIUM CHLORIDE, PRESERVATIVE FREE 10 ML: 5 INJECTION INTRAVENOUS at 21:27

## 2022-02-02 RX ADMIN — SODIUM CHLORIDE, PRESERVATIVE FREE 10 ML: 5 INJECTION INTRAVENOUS at 06:32

## 2022-02-02 RX ADMIN — LEVOTHYROXINE SODIUM 50 MCG: 0.05 TABLET ORAL at 06:32

## 2022-02-02 NOTE — PROGRESS NOTES
Spiritual Care Assessment/Progress Note  [facility]      NAME: Noman Shore      MRN: [id number]  AGE: 42 y.o.  SEX: female  Orthodox Affiliation: Oriental orthodox   Language: English     2/2/2022     Total Time (in minutes): 27     Spiritual Assessment begun in [facility] through conversation with:         [x]Patient        [] Family    [] Friend(s)        Reason for Consult: Initial/Spiritual assessment, patient floor     Spiritual beliefs: (Please include comment if needed)     [x] Identifies with a ashley tradition:    Judaism      [] Supported by a ashley community:            [] Claims no spiritual orientation:           [] Seeking spiritual identity:                [] Adheres to an individual form of spirituality:           [] Not able to assess:                           Identified resources for coping:      [] Prayer                               [] Music                  [] Guided Imagery     [x] Family/friends                 [] Pet visits     [] Devotional reading                         [] Unknown     [] Other:                                               Interventions offered during this visit: (See comments for more details)    Patient Interventions: Affirmation of emotions/emotional suffering,Coping skills reviewed/reinforced,Life review/legacy,Normalization of emotional/spiritual concerns,Prayer (assurance of),Orthodox beliefs/image of God discussed,Integration of medical assessment with existing values and beliefs,Affirmation of ashley           Plan of Care:     [] Support spiritual and/or cultural needs    [] Support AMD and/or advance care planning process      [] Support grieving process   [] Coordinate Rites and/or Rituals    [] Coordination with community clergy   [] No spiritual needs identified at this time   [] Detailed Plan of Care below (See Comments)  [] Make referral to Music Therapy  [] Make referral to Pet Therapy     [] Make referral to Addiction services  [] Make referral to [facility]  [] Make referral to Spiritual Care Partner  [] No future visits requested        [x] Contact Spiritual Care for further referrals     Comments:   visit was for initial spiritual assessment on 1 Med On[facility]. Miss Fabian Ren was in bed and appeared comfortable. She ended her phone conversation with her sister and welcomed  for the visit. She engaged at length about her medical condition and explored with  her coping resources. Patient shared that she has upcoming surgery the following day, which creates some fear and anxiety. But, she was also optimistic because of her ashley in God and support from family. Talking to people who care makes her feels she will be okay. She engaged in life review sharing about her parents and other family dynamics.  affirmed and validated her thoughts and emotions, assurance of prayer offered. Patient thanked  for the visit, stating that talking with  was helpful. Please contact spiritual care for any further referrals. Visited by: Kedar Roland.    Paging Service: [phone])

## 2022-02-02 NOTE — PROGRESS NOTES
Patient wants to have the risks of her procedure explained to her again before she will sign the consent. 3:51 PM notified neurosurgery office re: above.

## 2022-02-02 NOTE — PROGRESS NOTES
End of Shift Note    Bedside shift change report given to GERMÁN Landeros (oncoming nurse) by Lucero Gallardo RN (offgoing nurse). Report included the following information SBAR, Kardex, Intake/Output, MAR and Recent Results    Shift worked:  7p-7a     Shift summary and any significant changes:     Pt stable, no scheduled meds ordered, no pain complaints, education given and teaching to pt about her desire to go outside and smoke was given by myself and Charge IOCS, pt had multiple incontinent soft/loose BM's overnight, labs drawn - Type and Screen     Concerns for physician to address: Pt has prominent labia swelling noted with a pink/red coloration. Pt is still voiding - incontinent. Pennelope Menghini has been removed to see if it may be causing the genitalia irritation. Pt has had multiple incontinent BM's overnight    Pt has a lot of anxiety about upcoming neck surgery on Thurs 2/3/22 AM. She may need something to help calm her tonight. Pt requested to go outside to smoke in a wheelchair today and refused her Nicotine patch during the day 2/1. Zone phone for oncoming shift:   [phone]       Activity:  Activity Level: Bed Rest  Number times ambulated in hallways past shift: 0  Number of times OOB to chair past shift: 0    Cardiac:   Cardiac Monitoring: No           Access:   Current line(s): PIV     Genitourinary:   Urinary status: voiding and incontinent    Respiratory:   O2 Device: None (Room air)  Chronic home O2 use?: NO  Incentive spirometer at bedside: NO     GI:  Last Bowel Movement Date: 02/02/22  Current diet:  ADULT DIET Regular  DIET NPO  Passing flatus: YES  Tolerating current diet: YES       Pain Management:   Patient states pain is manageable on current regimen: YES    Skin:  Tru Score: 15  Interventions: turn team, increase time out of bed and PT/OT consult    Patient Safety:  Fall Score:  Total Score: 4  Interventions: bed/chair alarm, assistive device (walker, cane, etc), gripper socks, pt to call before getting OOB and stay with me (per policy)  High Fall Risk: Yes    Length of Stay:  Expected LOS: 2d 14h  Actual LOS: 2      Iggy Fung RN

## 2022-02-02 NOTE — PROGRESS NOTES
Problem: Pressure Injury - Risk of  Goal: *Prevention of pressure injury  Description: Document Tru Scale and appropriate interventions in the flowsheet. Outcome: Progressing Towards Goal  Note: Pressure Injury Interventions:  Sensory Interventions: Assess changes in LOC,Check visual cues for pain,Keep linens dry and wrinkle-free,Minimize linen layers,Discuss PT/OT consult with provider    Moisture Interventions: Absorbent underpads,Apply protective barrier, creams and emollients,Internal/External urinary devices,Maintain skin hydration (lotion/cream),Moisture barrier,Check for incontinence Q2 hours and as needed    Activity Interventions: PT/OT evaluation,Increase time out of bed    Mobility Interventions: PT/OT evaluation,HOB 30 degrees or less    Nutrition Interventions: Offer support with meals,snacks and hydration    Friction and Shear Interventions: HOB 30 degrees or less                Problem: Falls - Risk of  Goal: *Absence of Falls  Description: Document Leslee Fall Risk and appropriate interventions in the flowsheet.   Outcome: Progressing Towards Goal  Note: Fall Risk Interventions:  Mobility Interventions: Patient to call before getting OOB,PT Consult for mobility concerns,OT consult for ADLs,Utilize walker, cane, or other assistive device,Bed/chair exit alarm    Mentation Interventions: Increase mobility,Familiar objects from home,Bed/chair exit alarm,Adequate sleep, hydration, pain control    Medication Interventions: Evaluate medications/consider consulting pharmacy,Patient to call before getting OOB,Teach patient to arise slowly    Elimination Interventions: Call light in reach,Patient to call for help with toileting needs,Toilet paper/wipes in reach,Toileting schedule/hourly rounds    History of Falls Interventions: Bed/chair exit alarm,Investigate reason for fall,Room close to nurse's station,Evaluate medications/consider consulting pharmacy,Door open when patient unattended

## 2022-02-02 NOTE — PROGRESS NOTES
Problem: Mobility Impaired (Adult and Pediatric)  Goal: *Acute Goals and Plan of Care (Insert Text)  Description: FUNCTIONAL STATUS PRIOR TO ADMISSION: recurrent falls due to R knee buckling; has difficulty getting off sofa where she sleeps; reports home is too cluttered to use a RW. HOME SUPPORT PRIOR TO ADMISSION: The patient lived with mother in 1 story home with 4 steps to enter and unstable rails. Mother assists patient with ADLs and meals. Physical Therapy Goals  Initiated 1/29/2022  1. Patient will move from supine to sit and sit to supine , scoot up and down, and roll side to side in bed with supervision/set-up within 7 day(s). 2.  Patient will transfer from bed to chair and chair to bed with minimal assistance/contact guard assist using the least restrictive device within 7 day(s). 3.  Patient will perform sit to stand with minimal assistance/contact guard assist within 7 day(s). 4.  Patient will ambulate with minimal assistance/contact guard assist for 35 feet with the least restrictive device within 7 day(s). 5.  Patient will ascend/descend 4 stairs with 1 handrail(s) with minimal assistance/contact guard assist within 7 day(s). Outcome: Progressing Towards Goal   PHYSICAL THERAPY TREATMENT  Patient: Toñito Boudreaux (78 y.o. female)  Date: 2/2/2022  Diagnosis: Falls [W19. XXXA]  Inability to ambulate due to multiple joints [R26.2]  Cervical radiculopathy [M54.12]  Right knee pain [M25.561] <principal problem not specified>  Procedure(s) (LRB):  C3-4, C4-5, C5-6 ANTERIOR CERVICAL DISCECTOMY AND  FUSION (URGENT) (N/A)    Precautions: Fall  Chart, physical therapy assessment, plan of care and goals were reviewed. ASSESSMENT  Patient continues with skilled PT services and is progressing towards goals. RN cleared pt for session. Pt is hesitant to participate, noting fear of falling. Pt provides long commentary regarding lost of function and lack of ability to perform OOB.  Informed pt of prior documentation regarding standing and chair transfers. She updates to a refusal for standing of transfer to chair. Pt is agreeable to EOB sitting and LE xercises (below). Mod assist to sitting. Slowly convince to attempt standing. Poor R knee ext/ TKE while in standing, which created unsafe stand with RW. Pt provided with full guard for 3 additional standing trials (longest being 3 min). R knee blocked as support and biofeedback to prevent buckle. Improved posture and stability on each trial. RN and CNA assist with  linen change, as pt future worked on bed mobility. Placement is recommended, as pt's R sided neuro deficits will defer post ACDF    . Current Level of Function Impacting Discharge (mobility/balance): Poor R sided strength and R knee alex with standing atmepts    Other factors to consider for discharge: Not safe for ind OOB         PLAN :  Patient continues to benefit from skilled intervention to address the above impairments. Continue treatment per established plan of care. to address goals. Recommendation for discharge: (in order for the patient to meet his/her long term goals)  Therapy 3 hours per day 5-7 days per week    This discharge recommendation:  Has been made in collaboration with the attending provider and/or case management    IF patient discharges home will need the following DME: hospital bed, mechanical lift, rolling walker, and wheelchair       SUBJECTIVE:   Patient stated You don't understand.     OBJECTIVE DATA SUMMARY:   Critical Behavior:  Neurologic State: Alert,Appropriate for age,Eyes open spontaneously  Orientation Level: Oriented X4  Cognition: Follows commands,Impulsive  Safety/Judgement: Awareness of environment,Fall prevention,Insight into deficits,Decreased insight into deficits (learning about deficits; anxious re deficits)  Functional Mobility Training:  Bed Mobility:  Rolling:  Moderate assistance  Supine to Sit: Moderate assistance  Sit to Supine: Moderate assistance  Scooting: Maximum assistance  Transfers:  Sit to Stand: Moderate assistance  Stand to Sit: Moderate assistance  Bed to Chair:  (Refused)  Balance:  Sitting: Impaired  Sitting - Static: Good (unsupported)  Sitting - Dynamic: Good (unsupported)  Standing: Impaired; With support  Standing - Static: Fair  Standing - Dynamic : Fair  Ambulation/Gait Training:  Right Side Weight Bearing: As tolerated  Left Side Weight Bearing: Full  Therapeutic Exercises:       EXERCISE   Sets   Reps   Active Active Assist   Passive Self ROM   Comments   Ankle Pumps 1 20  [] [x] [x]R []    Quad Sets/Glut Sets 1 10 [] [x] [] []    Hamstring Sets   [] [] [] []    Short Arc Quads   [] [] [] []    Heel Slides   [] [] [] []    Straight Leg Raises   [] [] [] []    Hip Abd/Add 1 10 [] [x] [] []    Long Arc Quads 2 5 [x] [x]R [] []    Seated Marching 1 10 [] [x] [] [] Posterior LOB   Standing Marching   [] [] [] []       [] [] [] []        Pain Rating:  Pain in: 0  Pain out: 5 (R patella)    Activity Tolerance:   Fair-    After treatment patient left in no apparent distress:   Supine in bed, Call bell within reach, Bed / chair alarm activated, and Side rails x 3    COMMUNICATION/COLLABORATION:   The patients plan of care was discussed with: Registered nurse and Certified nursing assistant/patient care technician.      Brendon Leonardo PTA   Time Calculation: 55 mins

## 2022-02-02 NOTE — PROGRESS NOTES
Hospitalist Progress Note    NAME: Sayra Mcclelland   :  1980   MRN:  [id number]       Assessment / Plan:  Severe Cervical Spinal stenosis with Radiculopathy POA- MRI +ve for findings ()  Right knee pain POA  Causing Recurrent falls at home POA- per pt causes buckling  Ambulatory dysfunction due to above  History of arthritis  H/o L ankle Fracture s/p ORIF at [address] ortho  Hypothyroidism (newly diagnosed) POA  Morbid obesity POA  Tobacco abuse   MRI C Spine=  1. Disc protrusions C4-C5 and C5-C6, with severe canal stenosis and cord  flattening. 2. Probable abnormal cord signal C4-C5. Possible abnormal cord signal C5-C6. 3. Moderate canal stenosis C3-C4. 4. Neural foraminal stenoses: bilateral C3-C4, right and severe left C4-C5, left  C5-C6. Pt was under observation stay- changed to IP  after 2 [place] stay - failed outpatient management/treatment  Started on Mobic per recommendations of Orthopedic team- cont daily scheduled  Cont PO oxycodone prn severe pain only- avoid as able  Cont falls precautions  IP PT OT consult noted-- recommends IPR  IP  consulted- working on [employer] placement  IP Ortho consult appreciated-   - Generalized bony knee pain due to overload - NO KNEE intervention recommended  -  Wt loss through dietary changes   -  Considering adding once a day NSAID- Celebrex or mobic  - Rehab for upper and lower body strengthening  - AFO if R foot drop persists  - IP Neurology consulted for recommendations on R Foot drop- noted, MRI C & L spine ordered. Neurosurgery on boardplans to do surgery tomorrow for cervical surgery, n.p.o. after midnight. HbA1c- 5.6  TSH = 11.3, started on Synthroid daily, Outpatient TSH as outpatient in 3 weeks for further dose titration  Patient declined nicotine patch.     Code Status: Full code  Surrogate Decision Maker:  Mother 79 yrs old at home who cant help physically     DVT Prophylaxis: Lovenox  GI Prophylaxis: not indicated     Baseline: Ambulatory    Estimated discharge date: February 2? Barriers: IPRehab placement once cleared by Neuro surgery    Recommended Disposition: SAH/Rehab being looked into  PCR covid test (1/31) pending for placement     Subjective:     Chief Complaint / Reason for Physician Visit:   Patient seen and examined today, no complaints today. Review of Systems:  Symptom Y/N Comments  Symptom Y/N Comments   Fever/Chills n   Chest Pain n    Poor Appetite n   Edema n    Cough n   Abdominal Pain n    Sputum n   Joint Pain y R Knee   SOB/BECKETT n   Pruritis/Rash n    Nausea/vomit n   Tolerating PT/OT y    Diarrhea    Tolerating Diet y    Constipation    Other       Could NOT obtain due to:      Objective:     VITALS:   Last 24hrs VS reviewed since prior progress note. Most recent are:  Patient Vitals for the past 24 hrs:   Temp Pulse Resp BP SpO2   02/02/22 0759 98.4 °F (36.9 °C) 92 18 131/71 99 %   02/01/22 2359 98.3 °F (36.8 °C) 80 18 108/69 99 %   02/01/22 1955 98.2 °F (36.8 °C) 87 19 122/67 100 %   02/01/22 1539 98.4 °F (36.9 °C) 82 20 124/73 98 %     No intake or output data in the 24 hours ending 02/02/22 1228     I had a face to face encounter and independently examined this patient on 2/2/2022, as outlined below:  PHYSICAL EXAM:  General: WD, WN. Alert, cooperative, no acute distress, Morbid Obese +    EENT:  EOMI. Anicteric sclerae. MMM  Resp:  CTA bilaterally, no wheezing or rales. No accessory muscle use  CV:  Regular  rhythm,  No edema  GI:  Soft, Non distended, Non tender. +Bowel sounds  Neurologic:  Alert and oriented X 3, normal speech,   Psych:   Good insight. Not anxious nor agitated  Skin:  No rashes.   No jaundice    Reviewed most current lab test results and cultures  YES  Reviewed most current radiology test results   YES  Review and summation of old records today    NO  Reviewed patient's current orders and MAR    YES  PMH/SH reviewed - no change compared to H&P  ________________________________________________________________________  Care Plan discussed with:    Comments   Patient x    Family      RN x    Care Manager x    Consultant                        Multidiciplinary team rounds were held today with , nursing, pharmacist and clinical coordinator. Patient's plan of care was discussed; medications were reviewed and discharge planning was addressed. ________________________________________________________________________  Total NON critical care TIME:  36   Minutes    Total CRITICAL CARE TIME Spent:   Minutes non procedure based      Comments   >50% of visit spent in counseling and coordination of care     ________________________________________________________________________  Lui Rodriguez MD     Procedures: see electronic medical records for all procedures/Xrays and details which were not copied into this note but were reviewed prior to creation of Plan. LABS:  I reviewed today's most current labs and imaging studies. Pertinent labs include:  No results for input(s): WBC, HGB, HCT, PLT, HGBEXT, HCTEXT, PLTEXT, HGBEXT, HCTEXT, PLTEXT in the last 72 hours.   Recent Labs     02/02/22  1022   INR 1.0       Signed: Lui Rodriguez MD

## 2022-02-02 NOTE — PROGRESS NOTES
Transition of Care Plan:    RUR: 12%  Disposition: IPR-Referrals sent on 1/29/22 to facilities: acceptance pending and insurance auth required   Follow up appointments: Follow up with PCP and/or Specialist   DME needed: Facility will provide   Transportation at [address] required   [address] or means to access home:    N/A     Medicare Letter: N/A  Is patient a BCPI-A Bundle:  CM will provide if required          If yes, was Bundle Letter given?:    Is patient a  and connected with the [place]? N/A               If yes, was [employer] transfer form completed and [facility] notified? Caregiver Contact: Milana Phoenix (mother) [phone]  Discharge Caregiver contacted prior to discharge? Family to be contacted  Care Conference needed?:  Not at this time    UPDATE: 10:59AM      CM informed during IDRs that pt has been consulted by ortho for potential procedure. Pt will potentially transition to ortho if procedure takes place. CM will continue to follow. RANCHO Alcala, [place]      INITIAL NOTE: CM spoke with [facility] liasion: [facility], via telephone regarding referrals sent for placement. Pt was seen by therapist (PT/OT) on 2/1/22. CM sent updated clinicals to Lin, via [place]. CM currently waiting for response, regarding if pt is potential candidate for facility. CM informed by therapist that pt has potential potential surgery on 2/3/22. CM will staff case with clinical team regarding surgery and potential d/c. CM will continue to follow. PT WILL REQUIRE PCR COVID TEST AT THE TIME OF D/C. PT WILL REQUIRE EMTALA TO BE COMPLETED BY PHYSICIAN AND NURSE.     RANCHO Alcala, [address] [place] [address]

## 2022-02-02 NOTE — PROGRESS NOTES
Occupational Therapy  Chart reviewed, @ CT earlier today; currently with nsg  For blood draw, discussion of tomorrows planned procedure; will retry later as able.  Aura Wesley OTR/L

## 2022-02-03 ENCOUNTER — ANESTHESIA (OUTPATIENT)
Dept: SURGERY | Age: 42
DRG: 321 | End: 2022-02-03
Payer: MEDICAID

## 2022-02-03 ENCOUNTER — APPOINTMENT (OUTPATIENT)
Dept: GENERAL RADIOLOGY | Age: 42
DRG: 321 | End: 2022-02-03
Attending: NEUROLOGICAL SURGERY
Payer: MEDICAID

## 2022-02-03 LAB — HCG SERPL QL: NEGATIVE

## 2022-02-03 PROCEDURE — 77030010507 HC ADH SKN DERMBND J&J -B: Performed by: NEUROLOGICAL SURGERY

## 2022-02-03 PROCEDURE — 74011250636 HC RX REV CODE- 250/636: Performed by: NURSE ANESTHETIST, CERTIFIED REGISTERED

## 2022-02-03 PROCEDURE — 77030008684 HC TU ET CUF COVD -B: Performed by: ANESTHESIOLOGY

## 2022-02-03 PROCEDURE — 65270000029 HC RM PRIVATE

## 2022-02-03 PROCEDURE — 76210000017 HC OR PH I REC 1.5 TO 2 HR: Performed by: NEUROLOGICAL SURGERY

## 2022-02-03 PROCEDURE — 74011250636 HC RX REV CODE- 250/636: Performed by: ANESTHESIOLOGY

## 2022-02-03 PROCEDURE — 2709999900 HC NON-CHARGEABLE SUPPLY: Performed by: NEUROLOGICAL SURGERY

## 2022-02-03 PROCEDURE — 77030034094 HC GRFT BN SUB ELITE TRNTY MUSC -I1: Performed by: NEUROLOGICAL SURGERY

## 2022-02-03 PROCEDURE — 74011000258 HC RX REV CODE- 258: Performed by: NEUROLOGICAL SURGERY

## 2022-02-03 PROCEDURE — C1889 IMPLANT/INSERT DEVICE, NOC: HCPCS | Performed by: NEUROLOGICAL SURGERY

## 2022-02-03 PROCEDURE — C1713 ANCHOR/SCREW BN/BN,TIS/BN: HCPCS | Performed by: NEUROLOGICAL SURGERY

## 2022-02-03 PROCEDURE — 77030002916 HC SUT ETHLN J&J -A: Performed by: NEUROLOGICAL SURGERY

## 2022-02-03 PROCEDURE — 74011250637 HC RX REV CODE- 250/637: Performed by: NURSE PRACTITIONER

## 2022-02-03 PROCEDURE — 84703 CHORIONIC GONADOTROPIN ASSAY: CPT

## 2022-02-03 PROCEDURE — L0172 CERV COL SR FOAM 2PC PRE OTS: HCPCS | Performed by: NEUROLOGICAL SURGERY

## 2022-02-03 PROCEDURE — 74011250637 HC RX REV CODE- 250/637: Performed by: INTERNAL MEDICINE

## 2022-02-03 PROCEDURE — 0RG20A0 FUSION OF 2 OR MORE CERVICAL VERTEBRAL JOINTS WITH INTERBODY FUSION DEVICE, ANTERIOR APPROACH, ANTERIOR COLUMN, OPEN APPROACH: ICD-10-PCS | Performed by: NEUROLOGICAL SURGERY

## 2022-02-03 PROCEDURE — 77030002933 HC SUT MCRYL J&J -A: Performed by: NEUROLOGICAL SURGERY

## 2022-02-03 PROCEDURE — 77030005513 HC CATH URETH FOL11 MDII -B: Performed by: NEUROLOGICAL SURGERY

## 2022-02-03 PROCEDURE — 72020 X-RAY EXAM OF SPINE 1 VIEW: CPT

## 2022-02-03 PROCEDURE — 76000 FLUOROSCOPY <1 HR PHYS/QHP: CPT

## 2022-02-03 PROCEDURE — 74011250637 HC RX REV CODE- 250/637: Performed by: NEUROLOGICAL SURGERY

## 2022-02-03 PROCEDURE — 74011000250 HC RX REV CODE- 250: Performed by: INTERNAL MEDICINE

## 2022-02-03 PROCEDURE — 36415 COLL VENOUS BLD VENIPUNCTURE: CPT

## 2022-02-03 PROCEDURE — 77030013079 HC BLNKT BAIR HGGR 3M -A: Performed by: ANESTHESIOLOGY

## 2022-02-03 PROCEDURE — 77030026438 HC STYL ET INTUB CARD -A: Performed by: ANESTHESIOLOGY

## 2022-02-03 PROCEDURE — 74011250636 HC RX REV CODE- 250/636: Performed by: NEUROLOGICAL SURGERY

## 2022-02-03 PROCEDURE — 77030011267 HC ELECTRD BLD COVD -A: Performed by: NEUROLOGICAL SURGERY

## 2022-02-03 PROCEDURE — 77030012406 HC DRN WND PENRS BARD -A: Performed by: NEUROLOGICAL SURGERY

## 2022-02-03 PROCEDURE — 77030014007 HC SPNG HEMSTAT J&J -B: Performed by: NEUROLOGICAL SURGERY

## 2022-02-03 PROCEDURE — 77030014650 HC SEAL MTRX FLOSEL BAXT -C: Performed by: NEUROLOGICAL SURGERY

## 2022-02-03 PROCEDURE — 77030019908 HC STETH ESOPH SIMS -A: Performed by: ANESTHESIOLOGY

## 2022-02-03 PROCEDURE — 77030038600 HC TU BPLR IRR DISP STRY -B: Performed by: NEUROLOGICAL SURGERY

## 2022-02-03 PROCEDURE — 77030004391 HC BUR FLUT MEDT -C: Performed by: NEUROLOGICAL SURGERY

## 2022-02-03 PROCEDURE — 77030021678 HC GLIDESCP STAT DISP VERT -B: Performed by: ANESTHESIOLOGY

## 2022-02-03 PROCEDURE — 77030003029 HC SUT VCRL J&J -B: Performed by: NEUROLOGICAL SURGERY

## 2022-02-03 PROCEDURE — 74011000250 HC RX REV CODE- 250: Performed by: NEUROLOGICAL SURGERY

## 2022-02-03 PROCEDURE — 74011000250 HC RX REV CODE- 250: Performed by: NURSE ANESTHETIST, CERTIFIED REGISTERED

## 2022-02-03 PROCEDURE — 76010000175 HC OR TIME 4 TO 4.5 HR INTENSV-TIER 1: Performed by: NEUROLOGICAL SURGERY

## 2022-02-03 PROCEDURE — 0RB30ZZ EXCISION OF CERVICAL VERTEBRAL DISC, OPEN APPROACH: ICD-10-PCS | Performed by: NEUROLOGICAL SURGERY

## 2022-02-03 PROCEDURE — 76060000039 HC ANESTHESIA 4 TO 4.5 HR: Performed by: NEUROLOGICAL SURGERY

## 2022-02-03 DEVICE — CAGE 5030741 ANATOMIC PTC 14X11X7MM
Type: IMPLANTABLE DEVICE | Site: SPINE CERVICAL | Status: FUNCTIONAL
Brand: ANATOMIC PEEK PTC CERVICAL FUSION SYSTEM

## 2022-02-03 DEVICE — PLATE 7200060 ATL VISION ELITE 60MM
Type: IMPLANTABLE DEVICE | Site: SPINE CERVICAL | Status: FUNCTIONAL
Brand: ATLANTIS® ANTERIOR CERVICAL PLATE SYSTEM

## 2022-02-03 DEVICE — GRAFT BNE SUB M CANC FRZN MORSELIZED W/ VIABLE CELL TRINITY: Type: IMPLANTABLE DEVICE | Site: SPINE CERVICAL | Status: FUNCTIONAL

## 2022-02-03 RX ORDER — GLYCOPYRROLATE 0.2 MG/ML
INJECTION INTRAMUSCULAR; INTRAVENOUS AS NEEDED
Status: DISCONTINUED | OUTPATIENT
Start: 2022-02-03 | End: 2022-02-03 | Stop reason: HOSPADM

## 2022-02-03 RX ORDER — MORPHINE SULFATE 2 MG/ML
2 INJECTION, SOLUTION INTRAMUSCULAR; INTRAVENOUS
Status: DISCONTINUED | OUTPATIENT
Start: 2022-02-03 | End: 2022-02-03 | Stop reason: HOSPADM

## 2022-02-03 RX ORDER — PROPOFOL 10 MG/ML
INJECTION, EMULSION INTRAVENOUS AS NEEDED
Status: DISCONTINUED | OUTPATIENT
Start: 2022-02-03 | End: 2022-02-03 | Stop reason: HOSPADM

## 2022-02-03 RX ORDER — ONDANSETRON 2 MG/ML
INJECTION INTRAMUSCULAR; INTRAVENOUS AS NEEDED
Status: DISCONTINUED | OUTPATIENT
Start: 2022-02-03 | End: 2022-02-03 | Stop reason: HOSPADM

## 2022-02-03 RX ORDER — OXYCODONE HYDROCHLORIDE 5 MG/1
10 TABLET ORAL
Status: DISCONTINUED | OUTPATIENT
Start: 2022-02-03 | End: 2022-02-13 | Stop reason: HOSPADM

## 2022-02-03 RX ORDER — MORPHINE SULFATE 2 MG/ML
2 INJECTION, SOLUTION INTRAMUSCULAR; INTRAVENOUS
Status: ACTIVE | OUTPATIENT
Start: 2022-02-03 | End: 2022-02-04

## 2022-02-03 RX ORDER — SODIUM CHLORIDE, SODIUM LACTATE, POTASSIUM CHLORIDE, CALCIUM CHLORIDE 600; 310; 30; 20 MG/100ML; MG/100ML; MG/100ML; MG/100ML
25 INJECTION, SOLUTION INTRAVENOUS CONTINUOUS
Status: DISCONTINUED | OUTPATIENT
Start: 2022-02-03 | End: 2022-02-03 | Stop reason: HOSPADM

## 2022-02-03 RX ORDER — MIDAZOLAM HYDROCHLORIDE 1 MG/ML
0.5 INJECTION, SOLUTION INTRAMUSCULAR; INTRAVENOUS
Status: DISCONTINUED | OUTPATIENT
Start: 2022-02-03 | End: 2022-02-03 | Stop reason: HOSPADM

## 2022-02-03 RX ORDER — SODIUM CHLORIDE 0.9 % (FLUSH) 0.9 %
5-40 SYRINGE (ML) INJECTION EVERY 8 HOURS
Status: DISCONTINUED | OUTPATIENT
Start: 2022-02-03 | End: 2022-02-03 | Stop reason: HOSPADM

## 2022-02-03 RX ORDER — OXYCODONE HYDROCHLORIDE 5 MG/1
5 TABLET ORAL
Qty: 30 TABLET | Refills: 0 | Status: SHIPPED | OUTPATIENT
Start: 2022-02-03 | End: 2022-02-04

## 2022-02-03 RX ORDER — SODIUM CHLORIDE 0.9 % (FLUSH) 0.9 %
5-40 SYRINGE (ML) INJECTION AS NEEDED
Status: DISCONTINUED | OUTPATIENT
Start: 2022-02-03 | End: 2022-02-03 | Stop reason: HOSPADM

## 2022-02-03 RX ORDER — DIPHENHYDRAMINE HCL 25 MG
25 CAPSULE ORAL
Status: DISCONTINUED | OUTPATIENT
Start: 2022-02-03 | End: 2022-02-13 | Stop reason: HOSPADM

## 2022-02-03 RX ORDER — NEOSTIGMINE METHYLSULFATE 1 MG/ML
INJECTION, SOLUTION INTRAVENOUS AS NEEDED
Status: DISCONTINUED | OUTPATIENT
Start: 2022-02-03 | End: 2022-02-03 | Stop reason: HOSPADM

## 2022-02-03 RX ORDER — SODIUM CHLORIDE 9 MG/ML
125 INJECTION, SOLUTION INTRAVENOUS CONTINUOUS
Status: DISCONTINUED | OUTPATIENT
Start: 2022-02-03 | End: 2022-02-04

## 2022-02-03 RX ORDER — SODIUM CHLORIDE 0.9 % (FLUSH) 0.9 %
5-40 SYRINGE (ML) INJECTION AS NEEDED
Status: DISCONTINUED | OUTPATIENT
Start: 2022-02-03 | End: 2022-02-13 | Stop reason: HOSPADM

## 2022-02-03 RX ORDER — FENTANYL CITRATE 50 UG/ML
25 INJECTION, SOLUTION INTRAMUSCULAR; INTRAVENOUS
Status: DISCONTINUED | OUTPATIENT
Start: 2022-02-03 | End: 2022-02-03 | Stop reason: HOSPADM

## 2022-02-03 RX ORDER — ROCURONIUM BROMIDE 10 MG/ML
INJECTION, SOLUTION INTRAVENOUS AS NEEDED
Status: DISCONTINUED | OUTPATIENT
Start: 2022-02-03 | End: 2022-02-03 | Stop reason: HOSPADM

## 2022-02-03 RX ORDER — FENTANYL CITRATE 50 UG/ML
INJECTION, SOLUTION INTRAMUSCULAR; INTRAVENOUS AS NEEDED
Status: DISCONTINUED | OUTPATIENT
Start: 2022-02-03 | End: 2022-02-03 | Stop reason: HOSPADM

## 2022-02-03 RX ORDER — CEFAZOLIN SODIUM 1 G/3ML
3 INJECTION, POWDER, FOR SOLUTION INTRAMUSCULAR; INTRAVENOUS
Status: DISCONTINUED | OUTPATIENT
Start: 2022-02-03 | End: 2022-02-03 | Stop reason: SDUPTHER

## 2022-02-03 RX ORDER — MIDAZOLAM HYDROCHLORIDE 1 MG/ML
INJECTION, SOLUTION INTRAMUSCULAR; INTRAVENOUS AS NEEDED
Status: DISCONTINUED | OUTPATIENT
Start: 2022-02-03 | End: 2022-02-03 | Stop reason: HOSPADM

## 2022-02-03 RX ORDER — NALOXONE HYDROCHLORIDE 0.4 MG/ML
0.4 INJECTION, SOLUTION INTRAMUSCULAR; INTRAVENOUS; SUBCUTANEOUS AS NEEDED
Status: DISCONTINUED | OUTPATIENT
Start: 2022-02-03 | End: 2022-02-13 | Stop reason: HOSPADM

## 2022-02-03 RX ORDER — HYDROMORPHONE HYDROCHLORIDE 2 MG/ML
INJECTION, SOLUTION INTRAMUSCULAR; INTRAVENOUS; SUBCUTANEOUS AS NEEDED
Status: DISCONTINUED | OUTPATIENT
Start: 2022-02-03 | End: 2022-02-03 | Stop reason: HOSPADM

## 2022-02-03 RX ORDER — DIPHENHYDRAMINE HYDROCHLORIDE 50 MG/ML
12.5 INJECTION, SOLUTION INTRAMUSCULAR; INTRAVENOUS AS NEEDED
Status: DISCONTINUED | OUTPATIENT
Start: 2022-02-03 | End: 2022-02-03 | Stop reason: HOSPADM

## 2022-02-03 RX ORDER — DEXAMETHASONE SODIUM PHOSPHATE 4 MG/ML
INJECTION, SOLUTION INTRA-ARTICULAR; INTRALESIONAL; INTRAMUSCULAR; INTRAVENOUS; SOFT TISSUE AS NEEDED
Status: DISCONTINUED | OUTPATIENT
Start: 2022-02-03 | End: 2022-02-03 | Stop reason: HOSPADM

## 2022-02-03 RX ORDER — FENTANYL CITRATE 50 UG/ML
50 INJECTION, SOLUTION INTRAMUSCULAR; INTRAVENOUS AS NEEDED
Status: DISCONTINUED | OUTPATIENT
Start: 2022-02-03 | End: 2022-02-03 | Stop reason: HOSPADM

## 2022-02-03 RX ORDER — SODIUM CHLORIDE 0.9 % (FLUSH) 0.9 %
5-40 SYRINGE (ML) INJECTION EVERY 8 HOURS
Status: DISCONTINUED | OUTPATIENT
Start: 2022-02-03 | End: 2022-02-13 | Stop reason: HOSPADM

## 2022-02-03 RX ORDER — SODIUM CHLORIDE, SODIUM LACTATE, POTASSIUM CHLORIDE, CALCIUM CHLORIDE 600; 310; 30; 20 MG/100ML; MG/100ML; MG/100ML; MG/100ML
INJECTION, SOLUTION INTRAVENOUS
Status: DISCONTINUED | OUTPATIENT
Start: 2022-02-03 | End: 2022-02-03 | Stop reason: HOSPADM

## 2022-02-03 RX ORDER — TRAMADOL HYDROCHLORIDE 50 MG/1
50 TABLET ORAL
Status: DISCONTINUED | OUTPATIENT
Start: 2022-02-03 | End: 2022-02-13 | Stop reason: HOSPADM

## 2022-02-03 RX ORDER — CYCLOBENZAPRINE HCL 10 MG
10 TABLET ORAL
Status: DISCONTINUED | OUTPATIENT
Start: 2022-02-03 | End: 2022-02-13 | Stop reason: HOSPADM

## 2022-02-03 RX ORDER — LIDOCAINE HYDROCHLORIDE 20 MG/ML
INJECTION, SOLUTION EPIDURAL; INFILTRATION; INTRACAUDAL; PERINEURAL AS NEEDED
Status: DISCONTINUED | OUTPATIENT
Start: 2022-02-03 | End: 2022-02-03 | Stop reason: HOSPADM

## 2022-02-03 RX ORDER — PHENYLEPHRINE HCL IN 0.9% NACL 0.4MG/10ML
SYRINGE (ML) INTRAVENOUS AS NEEDED
Status: DISCONTINUED | OUTPATIENT
Start: 2022-02-03 | End: 2022-02-03 | Stop reason: HOSPADM

## 2022-02-03 RX ORDER — LIDOCAINE HYDROCHLORIDE 10 MG/ML
0.1 INJECTION, SOLUTION EPIDURAL; INFILTRATION; INTRACAUDAL; PERINEURAL AS NEEDED
Status: DISCONTINUED | OUTPATIENT
Start: 2022-02-03 | End: 2022-02-03 | Stop reason: HOSPADM

## 2022-02-03 RX ORDER — SUCCINYLCHOLINE CHLORIDE 20 MG/ML
INJECTION INTRAMUSCULAR; INTRAVENOUS AS NEEDED
Status: DISCONTINUED | OUTPATIENT
Start: 2022-02-03 | End: 2022-02-03 | Stop reason: HOSPADM

## 2022-02-03 RX ORDER — ONDANSETRON 2 MG/ML
4 INJECTION INTRAMUSCULAR; INTRAVENOUS
Status: ACTIVE | OUTPATIENT
Start: 2022-02-03 | End: 2022-02-04

## 2022-02-03 RX ORDER — ACETAMINOPHEN 325 MG/1
650 TABLET ORAL EVERY 6 HOURS
Status: DISCONTINUED | OUTPATIENT
Start: 2022-02-03 | End: 2022-02-13 | Stop reason: HOSPADM

## 2022-02-03 RX ORDER — DIAZEPAM 5 MG/1
5 TABLET ORAL
Status: DISCONTINUED | OUTPATIENT
Start: 2022-02-03 | End: 2022-02-13 | Stop reason: HOSPADM

## 2022-02-03 RX ORDER — HYDROMORPHONE HYDROCHLORIDE 1 MG/ML
.2-.5 INJECTION, SOLUTION INTRAMUSCULAR; INTRAVENOUS; SUBCUTANEOUS
Status: DISCONTINUED | OUTPATIENT
Start: 2022-02-03 | End: 2022-02-03 | Stop reason: HOSPADM

## 2022-02-03 RX ADMIN — Medication 3 MG: at 12:06

## 2022-02-03 RX ADMIN — FENTANYL CITRATE 100 MCG: 50 INJECTION, SOLUTION INTRAMUSCULAR; INTRAVENOUS at 08:11

## 2022-02-03 RX ADMIN — Medication 80 MCG: at 09:22

## 2022-02-03 RX ADMIN — SODIUM CHLORIDE, POTASSIUM CHLORIDE, SODIUM LACTATE AND CALCIUM CHLORIDE: 600; 310; 30; 20 INJECTION, SOLUTION INTRAVENOUS at 12:00

## 2022-02-03 RX ADMIN — SODIUM CHLORIDE, PRESERVATIVE FREE 10 ML: 5 INJECTION INTRAVENOUS at 06:17

## 2022-02-03 RX ADMIN — ROCURONIUM BROMIDE 10 MG: 10 INJECTION INTRAVENOUS at 08:46

## 2022-02-03 RX ADMIN — SODIUM CHLORIDE 25 MCG/MIN: 900 INJECTION, SOLUTION INTRAVENOUS at 08:36

## 2022-02-03 RX ADMIN — ROCURONIUM BROMIDE 5 MG: 10 INJECTION INTRAVENOUS at 08:11

## 2022-02-03 RX ADMIN — Medication 40 MCG: at 11:38

## 2022-02-03 RX ADMIN — Medication 80 MCG: at 11:45

## 2022-02-03 RX ADMIN — ACETAMINOPHEN 650 MG: 325 TABLET ORAL at 23:10

## 2022-02-03 RX ADMIN — MIDAZOLAM HYDROCHLORIDE 2 MG: 1 INJECTION, SOLUTION INTRAMUSCULAR; INTRAVENOUS at 08:00

## 2022-02-03 RX ADMIN — Medication 3 G: at 08:20

## 2022-02-03 RX ADMIN — ROCURONIUM BROMIDE 10 MG: 10 INJECTION INTRAVENOUS at 08:54

## 2022-02-03 RX ADMIN — CEFAZOLIN SODIUM 3 G: 10 INJECTION, POWDER, FOR SOLUTION INTRAVENOUS at 17:30

## 2022-02-03 RX ADMIN — CEFAZOLIN SODIUM 3 G: 10 INJECTION, POWDER, FOR SOLUTION INTRAVENOUS at 23:10

## 2022-02-03 RX ADMIN — SUCCINYLCHOLINE CHLORIDE 140 MG: 20 INJECTION, SOLUTION INTRAMUSCULAR; INTRAVENOUS at 08:11

## 2022-02-03 RX ADMIN — SODIUM CHLORIDE, PRESERVATIVE FREE 10 ML: 5 INJECTION INTRAVENOUS at 15:08

## 2022-02-03 RX ADMIN — SODIUM CHLORIDE, PRESERVATIVE FREE 10 ML: 5 INJECTION INTRAVENOUS at 21:16

## 2022-02-03 RX ADMIN — HYDROMORPHONE HYDROCHLORIDE 0.2 MG: 2 INJECTION, SOLUTION INTRAMUSCULAR; INTRAVENOUS; SUBCUTANEOUS at 11:29

## 2022-02-03 RX ADMIN — OXYCODONE 10 MG: 5 TABLET ORAL at 16:34

## 2022-02-03 RX ADMIN — ROCURONIUM BROMIDE 15 MG: 10 INJECTION INTRAVENOUS at 08:22

## 2022-02-03 RX ADMIN — Medication 40 MCG: at 10:56

## 2022-02-03 RX ADMIN — PROPOFOL 150 MG: 10 INJECTION, EMULSION INTRAVENOUS at 08:11

## 2022-02-03 RX ADMIN — SODIUM CHLORIDE 125 ML/HR: 9 INJECTION, SOLUTION INTRAVENOUS at 12:46

## 2022-02-03 RX ADMIN — SODIUM CHLORIDE, POTASSIUM CHLORIDE, SODIUM LACTATE AND CALCIUM CHLORIDE: 600; 310; 30; 20 INJECTION, SOLUTION INTRAVENOUS at 09:00

## 2022-02-03 RX ADMIN — SODIUM CHLORIDE, PRESERVATIVE FREE 10 ML: 5 INJECTION INTRAVENOUS at 16:36

## 2022-02-03 RX ADMIN — HYDROMORPHONE HYDROCHLORIDE 0.4 MG: 2 INJECTION, SOLUTION INTRAMUSCULAR; INTRAVENOUS; SUBCUTANEOUS at 10:30

## 2022-02-03 RX ADMIN — GLYCOPYRROLATE 0.4 MG: 0.2 INJECTION, SOLUTION INTRAMUSCULAR; INTRAVENOUS at 12:06

## 2022-02-03 RX ADMIN — DEXAMETHASONE SODIUM PHOSPHATE 8 MG: 4 INJECTION, SOLUTION INTRAMUSCULAR; INTRAVENOUS at 08:44

## 2022-02-03 RX ADMIN — LIDOCAINE HYDROCHLORIDE 100 MG: 20 INJECTION, SOLUTION INTRAVENOUS at 08:11

## 2022-02-03 RX ADMIN — OXYCODONE 10 MG: 5 TABLET ORAL at 21:16

## 2022-02-03 RX ADMIN — ONDANSETRON HYDROCHLORIDE 4 MG: 2 INJECTION, SOLUTION INTRAMUSCULAR; INTRAVENOUS at 11:43

## 2022-02-03 RX ADMIN — SODIUM CHLORIDE, POTASSIUM CHLORIDE, SODIUM LACTATE AND CALCIUM CHLORIDE 25 ML/HR: 600; 310; 30; 20 INJECTION, SOLUTION INTRAVENOUS at 07:55

## 2022-02-03 RX ADMIN — SODIUM CHLORIDE 50 MCG/MIN: 900 INJECTION, SOLUTION INTRAVENOUS at 08:44

## 2022-02-03 RX ADMIN — Medication 80 MCG: at 09:00

## 2022-02-03 RX ADMIN — Medication 3 AMPULE: at 07:55

## 2022-02-03 RX ADMIN — HYDROMORPHONE HYDROCHLORIDE 0.4 MG: 2 INJECTION, SOLUTION INTRAMUSCULAR; INTRAVENOUS; SUBCUTANEOUS at 08:52

## 2022-02-03 RX ADMIN — Medication 1 AMPULE: at 21:16

## 2022-02-03 RX ADMIN — Medication 40 MCG: at 08:35

## 2022-02-03 RX ADMIN — SODIUM CHLORIDE 125 ML/HR: 9 INJECTION, SOLUTION INTRAVENOUS at 19:51

## 2022-02-03 RX ADMIN — ROCURONIUM BROMIDE 5 MG: 10 INJECTION INTRAVENOUS at 11:36

## 2022-02-03 RX ADMIN — ROCURONIUM BROMIDE 10 MG: 10 INJECTION INTRAVENOUS at 09:23

## 2022-02-03 NOTE — PROGRESS NOTES
Occupational Therapy  Will defer OT, as pt is off the floor for surgery (ACDF) at this time, and continue to follow as appropriate.

## 2022-02-03 NOTE — PROGRESS NOTES
End of Shift Note    Bedside shift change report given to Nory Gomes (oncoming nurse) by Alexander Mejia RN (offgoing nurse). Report included the following information SBAR, Kardex, ED Summary, Intake/Output and MAR    Shift worked:  8330-1457     Shift summary and any significant changes:     Patient no c/o right neck pain  Patient refuse pain medication. Informed patient of surgery and to be NPO after midnight. Patient states understanding. Patient stated she would like a doctor to explain the consent one more time before she has surgery. Patient incontinent voiding QS. BM x 1 loose stool. Patien slept most of night. CHG complete x2. Vitals WNL. Concerns for physician to address:  see above      Zone phone for oncoming shift:  [phone]     Activity:  Activity Level: Bed Rest  Number times ambulated in hallways past shift: 0  Number of times OOB to chair past shift: 0    Cardiac:   Cardiac Monitoring: No      Cardiac Rhythm: Sinus Rhythm    Access:   Current line(s): PIV     Genitourinary:   Urinary status: incontinent    Respiratory:   O2 Device: None (Room air)  Chronic home O2 use?: NO  Incentive spirometer at bedside: NO     GI:  Last Bowel Movement Date: 02/02/22  Current diet:  DIET NPO  Passing flatus: YES  Tolerating current diet: NPO       Pain Management:   Patient states pain is manageable on current regimen: YES    Skin:  Tru Score: 15  Interventions: turn team, increase time out of bed, PT/OT consult and internal/external urinary devices, limit briefs    Patient Safety:  Fall Score:  Total Score: 3  Interventions: bed/chair alarm, assistive device (walker, cane, etc), gripper socks and pt to call before getting OOB  High Fall Risk: Yes    Length of Stay:  Expected LOS: 4d 7h  Actual LOS: 3      Rose Santamaria RN

## 2022-02-03 NOTE — PROGRESS NOTES
Occupational Therapy  New orders received post pt's ACDF surgery this date. Will follow up tomorrow to initiate OT ReEvaluation on POD 1 . Thank you.

## 2022-02-03 NOTE — PERIOP NOTES
80 - TRANSFER - IN REPORT:    Verbal report received from [facility] RN (name) on Cindy Hudson  being received from 1123(unit) for ordered procedure      Report consisted of patients Situation, Background, Assessment and   Recommendations(SBAR). Information from the following report(s) Pre Procedure Checklist and Procedure Verification was reviewed with the receiving nurse. Opportunity for questions and clarification was provided. Assessment completed upon patients arrival to unit and care assumed.

## 2022-02-03 NOTE — PROGRESS NOTES
Problem: Pressure Injury - Risk of  Goal: *Prevention of pressure injury  Description: Document Tru Scale and appropriate interventions in the flowsheet. Outcome: Progressing Towards Goal  Note: Pressure Injury Interventions:  Sensory Interventions: Assess need for specialty bed,Keep linens dry and wrinkle-free,Maintain/enhance activity level,Minimize linen layers    Moisture Interventions: Absorbent underpads,Apply protective barrier, creams and emollients,Check for incontinence Q2 hours and as needed,Limit adult briefs    Activity Interventions: PT/OT evaluation,Pressure redistribution bed/mattress(bed type)    Mobility Interventions: PT/OT evaluation,Pressure redistribution bed/mattress (bed type),Assess need for specialty bed    Nutrition Interventions: Offer support with meals,snacks and hydration    Friction and Shear Interventions: Apply protective barrier, creams and emollients,HOB 30 degrees or less,Lift sheet                Problem: Patient Education: Go to Patient Education Activity  Goal: Patient/Family Education  Outcome: Progressing Towards Goal     Problem: Falls - Risk of  Goal: *Absence of Falls  Description: Document Leslee Fall Risk and appropriate interventions in the flowsheet.   Outcome: Progressing Towards Goal  Note: Fall Risk Interventions:  Mobility Interventions: Bed/chair exit alarm,Communicate number of staff needed for ambulation/transfer,OT consult for ADLs,Patient to call before getting OOB,PT Consult for mobility concerns,PT Consult for assist device competence,Utilize walker, cane, or other assistive device    Mentation Interventions: Increase mobility,Room close to nurse's station,Toileting rounds,Update white board,Door open when patient unattended    Medication Interventions: Assess postural VS orthostatic hypotension,Bed/chair exit alarm,Evaluate medications/consider consulting pharmacy,Patient to call before getting OOB,Teach patient to arise slowly    Elimination Interventions: Bed/chair exit alarm,Call light in reach,Elevated toilet seat,Toileting schedule/hourly rounds    History of Falls Interventions: Bed/chair exit alarm,Door open when patient unattended,Room close to nurse's station

## 2022-02-03 NOTE — PERIOP NOTES
Handoff Report from Operating Room to PACU    Report received from [address] [place] [address] and Jace Pearson regarding Zaki Infante.      Surgeon(s):  Daquan Almazan MD  And Procedure(s) (LRB):  C3-4, C4-5, C5-6 ANTERIOR CERVICAL DISCECTOMY AND  FUSION  (N/A)  confirmed   with allergies, drains and dressings discussed. Anesthesia type, drugs, patient history, complications, estimated blood loss, vital signs, intake and output, and last pain medication, lines, reversal medications and temperature were reviewed. 1255- Patient able to swallow ice chips without complications. Patient currently  Denies numbness or tingling in any of her extremities at time. Will monitor. 1412- TRANSFER - OUT REPORT:    Verbal report given to Fauzia DUNLAP(name) on Zaki Infante  being transferred to [facility](unit) for routine post - op       Report consisted of patients Situation, Background, Assessment and   Recommendations(SBAR). Information from the following report(s) OR Summary, Procedure Summary, Intake/Output and MAR was reviewed with the receiving nurse. Lines:   Peripheral IV 01/29/22 Left Antecubital (Active)   Site Assessment Clean, dry, & intact 02/03/22 1216   Phlebitis Assessment 0 02/03/22 1216   Infiltration Assessment 0 02/03/22 1216   Dressing Status Clean, dry, & intact 02/03/22 1216   Dressing Type Tape;Transparent 02/03/22 1216   Hub Color/Line Status Pink;Capped 02/03/22 1216   Action Taken Open ports on tubing capped 02/01/22 2200   Alcohol Cap Used Yes 02/01/22 2200       Peripheral IV 02/03/22 Anterior;Distal;Right Forearm (Active)   Site Assessment Clean, dry, & intact 02/03/22 1216   Phlebitis Assessment 0 02/03/22 1216   Infiltration Assessment 0 02/03/22 1216   Dressing Status Clean, dry, & intact 02/03/22 1216   Dressing Type Tape;Transparent 02/03/22 1216   Hub Color/Line Status Pink; Infusing 02/03/22 1216        Opportunity for questions and clarification was provided.       Patient transported with: O2 @ 4l liters  Tech   Chart  Patient's mother updated on patient care and new room number. Spoke with Jessica  RN from Oncology where patient came from.  Staff from oncology will deliver patient's belongings to new room [phone]

## 2022-02-03 NOTE — PERIOP NOTES
5352 - PT ARRIVED INTO PRE-OP 18.  PT A&O X4. GAMEZ SPON, WEAKLY AND TO COMMAND. RESP EVEN AND UNLABORED. POX ON RA >94%. BSB AND CLEAR ON AUSC. STRONG NON-PRODUCTIVE COUGH NOTED. PT C/O NUMBNESS AND TINGLING IN XIAO HANDS AND XIAO LE.  NO C/O PAIN - 0/10 ON PAIN SCALE. 0722 -  PT UNABLE TO VOID - SERUM HCG SENT TO LAB.  0800 - SERUM HCG RESULTED - NEG.

## 2022-02-03 NOTE — PROGRESS NOTES
TRANSFER - OUT REPORT:    Verbal report given to [facility] RN(name) on Cindy Hudson  being transferred to OR(unit) for ordered procedure       Report consisted of patients Situation, Background, Assessment and   Recommendations(SBAR). Information from the following report(s) SBAR, Kardex, ED Summary, Intake/Output, MAR and Recent Results was reviewed with the receiving nurse. Lines:   Peripheral IV 01/29/22 Left Antecubital (Active)   Site Assessment Clean, dry, & intact 02/01/22 2200   Phlebitis Assessment 0 02/01/22 2200   Infiltration Assessment 0 02/01/22 2200   Dressing Status Clean, dry, & intact 02/01/22 2200   Dressing Type Transparent;Tape 02/01/22 2200   Hub Color/Line Status Patent; Flushed 02/02/22 2127   Action Taken Open ports on tubing capped 02/01/22 2200   Alcohol Cap Used Yes 02/01/22 2200        Opportunity for questions and clarification was provided.       Patient transported with:   [employer]

## 2022-02-03 NOTE — PROGRESS NOTES
End of Shift Note    Bedside shift change report given to Rose (oncoming nurse) by Dakotah Barboza RN (offgoing nurse). Report included the following information SBAR, Kardex, MAR, Accordion and Recent Results    Shift worked:  7527-1110. Shift summary and any significant changes:     No acute complaints. Patient expressed desire to have neurosurgery explain the risks of her procedure to her again before she signs the consent form. Patient agreed to work with physical therapy, but declined to get up into the recliner. Patient verbalized understanding about the benefits of getting out of bed. Reinforced with patient that she is not permitted to leave the unit with visitors to smoke. Reminded patient that this is a smoke-free facility. Patient continues to refuse nicotine patch. Concerns for physician to address: Patient has some significant swelling in the urogenital area, and multiple lesions which are painful to the touch. No vaginal discharge has been noted, but the patient has been incontinent of urine and (loose) stool. Patient unable to give a good history of when problems began to develop in this are. Zone phone for oncoming shift:          Activity:  Activity Level: Bed Rest  Number times ambulated in hallways past shift: 0  Number of times OOB to chair past shift: 0    Cardiac:   Cardiac Monitoring: No           Access:   Current line(s): PIV     Genitourinary:   Urinary status: voiding and incontinent    Respiratory:   O2 Device: None (Room air)  Chronic home O2 use?: NO  Incentive spirometer at bedside: N/A     GI:  Last Bowel Movement Date: 02/02/22  Current diet:  ADULT DIET Regular  DIET NPO  Passing flatus: YES  Tolerating current diet: YES       Pain Management:   Patient states pain is manageable on current regimen: YES    Skin:  Tru Score: 15  Interventions: increase time out of bed, PT/OT consult and limit briefs    Patient Safety:  Fall Score:  Total Score: 4  Interventions: bed/chair alarm, assistive device (walker, cane, etc), gripper socks and pt to call before getting OOB  High Fall Risk: Yes    Length of Stay:  Expected LOS: 4d 7h  Actual LOS: 2000 Patrick Todd 50, RN

## 2022-02-03 NOTE — PROGRESS NOTES
Ortho / Neurosurgery NP Note    POD# 0  s/p C3-4, C4-5, C5-6 ANTERIOR CERVICAL DISCECTOMY AND  FUSION    Pt seen with no visitor present. Pt resting in bed. Drowsy, wakes to voice. Dislikes c-collar, yelling for it to be removed \"I can't move in this thing! \"  Explained importance of collar for healing   Reports numbness in rom hands has resolved and strength feels improved  Unsure of changes in BLE as of yet, has not been oob. Also c/o sore throat, hoarse voice unchanged from pre-op. Current smoker. Denies nausea     VSS Afebrile. desats when drifts to sleep to 88% on RA - 2L NC applied  Patient continues to remove O2 \"itches her nose\"    Visit Vitals  BP (!) 108/48   Pulse 94   Temp 98.2 °F (36.8 °C)   Resp 18   Ht 5' 2\" (1.575 m)   Wt 136.1 kg (300 lb)   SpO2 93%   BMI 54.87 kg/m²       Voiding status: Hernandez   Output (mL)  Urine Voided: 750 ml (02/01/22 0807)  Last Bowel Movement Date: 02/02/22 (02/03/22 1678)  Unmeasurable Output  Urine Occurrence(s): 2 (02/03/22 0617)  Stool Occurrence(s): 1 (02/03/22 0617)      Labs    Lab Results   Component Value Date/Time    HGB 13.6 01/29/2022 12:45 AM      Lab Results   Component Value Date/Time    INR 1.0 02/02/2022 10:22 AM      Lab Results   Component Value Date/Time    Sodium 137 01/29/2022 12:45 AM    Potassium 4.4 01/29/2022 12:45 AM    Chloride 107 01/29/2022 12:45 AM    CO2 24 01/29/2022 12:45 AM    Glucose 91 01/29/2022 12:45 AM    BUN 17 01/29/2022 12:45 AM    Creatinine 1.00 01/29/2022 12:45 AM    Calcium 8.5 01/29/2022 12:45 AM     Recent Glucose Results: No results found for: GLU, GLUPOC, GLUCPOC        Body mass index is 54.87 kg/m². : A BMI > 30 is classified as obesity and > 40 is classified as morbid obesity. Dressing c.d.i  HV drain - charged   Calves soft and supple;  No pain with passive stretch  Sensation and motor intact - BUE  4+/5, deltoids/biceps 4+/5  Wiggling toes but uncooperative to further assessment   SCDs for mechanical DVT proph while in bed     PLAN:  1) PT/OT - c-collar   3) Pain control - scheduled tylenol, prn oxycodone. Chloraseptic for sore throat. If requires oxycodone more regularly, may need to add bowel regimen. 4) HV drain - monitor output  5) Discharge planning - patient is a great candidate for IP Rehab to regain her recent loss of function due to severe spinal stenosis.        Terry Kitchen NP

## 2022-02-03 NOTE — PROGRESS NOTES
Hospitalist Progress Note    NAME: Teena Dodson   :  1980   MRN:  [id number]       Assessment / Plan:  Severe Cervical Spinal stenosis with Radiculopathy POA- MRI +ve for findings ()  Right knee pain POA  Causing Recurrent falls at home POA- per pt causes buckling  Ambulatory dysfunction due to above  History of arthritis  H/o L ankle Fracture s/p ORIF at [facility] ortho  Hypothyroidism (newly diagnosed) POA  Morbid obesity POA  Tobacco abuse   MRI C Spine=  1. Disc protrusions C4-C5 and C5-C6, with severe canal stenosis and cord  flattening. 2. Probable abnormal cord signal C4-C5. Possible abnormal cord signal C5-C6. 3. Moderate canal stenosis C3-C4. 4. Neural foraminal stenoses: bilateral C3-C4, right and severe left C4-C5, left  C5-C6. Pt was under observation stay- changed to IP  after 2 [place] stay - failed outpatient management/treatment  Started on Mobic per recommendations of Orthopedic team- cont daily scheduled  Cont PO oxycodone prn severe pain only- avoid as able  Cont falls precautions  IP PT OT consult noted-- recommends IPR  IP  consulted- working on [employer] placement  IP Ortho consult appreciated-   - Generalized bony knee pain due to overload - NO KNEE intervention recommended  -  Wt loss through dietary changes   -  Considering adding once a day NSAID- Celebrex or mobic  - Rehab for upper and lower body strengthening  - AFO if R foot drop persists  - IP Neurology consulted for recommendations on R Foot drop- noted, MRI C & L spine ordered. Patient Getting surgery by the neurosurgeon today. HbA1c- 5.6  TSH = 11.3, started on Synthroid daily, Outpatient TSH as outpatient in 3 weeks for further dose titration  Patient declined nicotine patch.     Code Status: Full code  Surrogate Decision Maker:  Mother 79 yrs old at home who cant help physically     DVT Prophylaxis: Lovenox  GI Prophylaxis: not indicated     Baseline: Ambulatory    Estimated discharge date: February 2?  Barriers: IPRehab placement once cleared by Neuro surgery    Recommended Disposition: SAH/Rehab being looked into  PCR covid test (1/31) pending for placement     Subjective:     Chief Complaint / Reason for Physician Visit:   Patient examined today, no complaints. Patient getting ready for the surgery in the morning. Review of Systems:  Symptom Y/N Comments  Symptom Y/N Comments   Fever/Chills n   Chest Pain n    Poor Appetite n   Edema n    Cough n   Abdominal Pain n    Sputum n   Joint Pain y R Knee   SOB/BECKETT n   Pruritis/Rash n    Nausea/vomit n   Tolerating PT/OT y    Diarrhea    Tolerating Diet y    Constipation    Other       Could NOT obtain due to:      Objective:     VITALS:   Last 24hrs VS reviewed since prior progress note. Most recent are:  Patient Vitals for the past 24 hrs:   Temp Pulse Resp BP SpO2   02/03/22 1330  80 14 (!) 109/48 93 %   02/03/22 1315  85 14 (!) 108/52 93 %   02/03/22 1300  82 13 (!) 111/47 92 %   02/03/22 1245  79 12 (!) 118/46 90 %   02/03/22 1230  86 16 (!) 120/57 91 %   02/03/22 1225  82 13 (!) 108/46 90 %   02/03/22 1220  91 14 (!) 111/47 91 %   02/03/22 1218 98.8 °F (37.1 °C) 93 18 (!) 123/54 93 %   02/03/22 1216  81 20 (!) 123/54 90 %   02/03/22 0658 99.6 °F (37.6 °C) 87 22 125/60 95 %   02/02/22 2328  96 18 137/82 96 %   02/02/22 1943 97.9 °F (36.6 °C) (!) 101 18 121/64 95 %   02/02/22 1527 97.8 °F (36.6 °C) 74 18 121/70 99 %       Intake/Output Summary (Last 24 hours) at 2/3/2022 1344  Last data filed at 2/3/2022 1254  Gross per 24 hour   Intake 1900 ml   Output 380 ml   Net 1520 ml        I had a face to face encounter and independently examined this patient on 2/3/2022, as outlined below:  PHYSICAL EXAM:  General: WD, WN. Alert, cooperative, no acute distress, Morbid Obese +    EENT:  EOMI. Anicteric sclerae. MMM  Resp:  CTA bilaterally, no wheezing or rales. No accessory muscle use  CV:  Regular  rhythm,  No edema  GI:  Soft, Non distended, Non tender. +Bowel sounds  Neurologic:  Alert and oriented X 3, normal speech,   Psych:   Good insight. Not anxious nor agitated  Skin:  No rashes. No jaundice    Reviewed most current lab test results and cultures  YES  Reviewed most current radiology test results   YES  Review and summation of old records today    NO  Reviewed patient's current orders and MAR    YES  PMH/SH reviewed - no change compared to H&P  ________________________________________________________________________  Care Plan discussed with:    Comments   Patient x    Family      RN x    Care Manager     Consultant                        Multidiciplinary team rounds were held today with , nursing, pharmacist and clinical coordinator. Patient's plan of care was discussed; medications were reviewed and discharge planning was addressed. ________________________________________________________________________  Total NON critical care TIME:  30   Minutes    Total CRITICAL CARE TIME Spent:   Minutes non procedure based      Comments   >50% of visit spent in counseling and coordination of care     ________________________________________________________________________  Justin Harden MD     Procedures: see electronic medical records for all procedures/Xrays and details which were not copied into this note but were reviewed prior to creation of Plan. LABS:  I reviewed today's most current labs and imaging studies. Pertinent labs include:  No results for input(s): WBC, HGB, HCT, PLT, HGBEXT, HCTEXT, PLTEXT, HGBEXT, HCTEXT, PLTEXT in the last 72 hours.   Recent Labs     02/02/22  1022   INR 1.0       Signed: Justin Harden MD

## 2022-02-03 NOTE — PROGRESS NOTES
Physical Therapy    Pt currently in OR for ACDF. Will defer and follow as appropriate, will await new orders post surgery.     Rodrick Méndez, PT

## 2022-02-03 NOTE — ANESTHESIA POSTPROCEDURE EVALUATION
Procedure(s):  C3-4, C4-5, C5-6 ANTERIOR CERVICAL DISCECTOMY AND  FUSION . general    Anesthesia Post Evaluation        Patient location during evaluation: PACU  Note status: Adequate. Level of consciousness: responsive to verbal stimuli and sleepy but conscious  Pain management: satisfactory to patient  Airway patency: patent  Anesthetic complications: no  Cardiovascular status: acceptable  Respiratory status: acceptable  Hydration status: acceptable  Comments: +Post-Anesthesia Evaluation and Assessment    Patient: Noman Shore MRN: [id number]  SSN: [id number]   YOB: 1980  Age: 42 y.o. Sex: female      Cardiovascular Function/Vital Signs    BP (!) 111/47   Pulse 82   Temp 37.1 °C (98.8 °F)   Resp 13   Ht 5' 2\" (1.575 m)   Wt 136.1 kg (300 lb)   SpO2 92%   BMI 54.87 kg/m²     Patient is status post Procedure(s):  C3-4, C4-5, C5-6 ANTERIOR CERVICAL DISCECTOMY AND  FUSION . Nausea/Vomiting: Controlled. Postoperative hydration reviewed and adequate. Pain:  Pain Scale 1: Numeric (0 - 10) (02/03/22 1300)  Pain Intensity 1: 1 (02/03/22 1300)   Managed. Neurological Status:   Neuro (WDL): Exceptions to WDL (02/03/22 1216)   At baseline. Mental Status and Level of Consciousness: Arousable. Pulmonary Status:   O2 Device: Nasal cannula (02/03/22 1300)   Adequate oxygenation and airway patent. Complications related to anesthesia: None    Post-anesthesia assessment completed. No concerns. Signed By: William Blair MD    2/3/2022  Post anesthesia nausea and vomiting:  controlled      INITIAL Post-op Vital signs:   Vitals Value Taken Time   /48 02/03/22 1330   Temp 37.1 °C (98.8 °F) 02/03/22 1218   Pulse 82 02/03/22 1334   Resp 18 02/03/22 1334   SpO2 94 % 02/03/22 1334   Vitals shown include unvalidated device data.

## 2022-02-03 NOTE — ANESTHESIA PREPROCEDURE EVALUATION
Relevant Problems   No relevant active problems       Anesthetic History   No history of anesthetic complications            Review of Systems / Medical History  Patient summary reviewed, nursing notes reviewed and pertinent labs reviewed    Pulmonary          Smoker      Comments: Current Every Day Smoker - 5.5 pack years   Neuro/Psych             Comments: Severe cervical radiculopathy  CT spine:  Severe central canal stenosis with cord compression redemonstrated at C3-4,  C4-5, C5-6. Stenosis is largely caused by the disc protrusions and congenitally  short pedicles. Mild central stenosis C6-7  Severe left C4-5, moderate severe left C5-6, and moderate right C4-5 neural  foraminal stenosis.  Cardiovascular                  Exercise tolerance: <4 METS     GI/Hepatic/Renal  Within defined limits              Endo/Other      Hypothyroidism  Arthritis     Other Findings   Comments: Severe cervical radiculopathy  Super Morbid Obesity  Inability to ambulate due to multiple joints         Physical Exam    Airway  Mallampati: III    Neck ROM: normal range of motion   Mouth opening: Normal     Cardiovascular  Regular rate and rhythm,  S1 and S2 normal,  no murmur, click, rub, or gallop             Dental    Dentition: Poor dentition     Pulmonary      Decreased breath sounds: bibasilar           Abdominal  GI exam deferred       Other Findings            Anesthetic Plan    ASA: 3  Anesthesia type: general    Monitoring Plan: BIS      Induction: Intravenous  Anesthetic plan and risks discussed with: Patient

## 2022-02-03 NOTE — DISCHARGE INSTRUCTIONS
Danyelle Guardado M.D. What can I do?  The only exercise you should do is walking. Start by walking twice a day for 5 minutes, then increase by  2-3 minutes every day until you reach 25 minutes twice a day. DO NOT sit for long periods of time (20 minutes at a time for the first couple of weeks, then gradually increase.  No heavy lifting (5 lbs max); no straining, twisting, or bending.  No driving until your physician tells you it is ok. It is, however, ok to ride short distances in a car.  If you are required to wear a back brace, you may remove it when you are sleeping unless your doctor has advised against it.  If you are required to wear a cervical collar, you must sleep in it. You can remove it only for showers. What can I eat?  You may resume your regular home diet as tolerated. If your throat is sore, you may want to eat soft food for the first few days. When can I take a shower?  You may shower leaving on your occlusive (waterproof) dressing on allowing water to run over the dressing. The dressing will fall off in 1-5 days. If it doesn't fall off, it may be removed in 5 days. The small pieces of tape (steri strips)  underneath it should stay on. Let water run over them, then pat dry gently.  Do not take baths or soak in pools.  You may remove your brace for showering. Medications:   Check with your physician before taking any anti-inflammatory medicines (Advil, Aleve, ibuprofen, aspirin).  Take prescription medicine as directed. DO NOT take more than prescribed. Call your physician if the prescribed dose is not sufficiently controlling your pain.  It is important to have regular bowel movements. Pain medications may cause constipation. Drink plenty of fluids, get enough fiber in your diet, and use stool softeners and drink prune juice to help prevent constipation.   Do not take laxatives if at all possible except in severe situations. It can result in a vicious cycle of constipation and diarrhea.  Do not put any ointments or creams on your incision unless directed to do so by your physician.  Tobacco products should be avoided during the postoperative phase. When do I see the physician again?  Please call your physicians office as soon as you get home to schedule your 1st post operative appointment. You should be seen approximately two weeks after your surgery. At this appointment you will see your doctors Assistant for a wound check and to answer any questions you may have.  You will see your physician approximately six weeks after your surgery.  If you had a fusion, you will need to get an order for xrays to be taken prior to the six week appointment. These should be done on the day of the appointment or 1-2 days before.  If you need the number to your doctors office, please request it from your nurse or see below. NOTIFY YOUR PHYSICIAN OF ANY OF THE FOLLOWING:     Fever above 101º for 24 hrs.  Nausea or vomiting.  Inability to urinate   Loss of bowel or bladder function (sudden onset of incontinence)   Changes in sensation (numbness, tingling, color change) in your extremities   Pain not relieved by your medicine.    Redness, swelling or drainage from your incision   Persistent pain in the calf of either leg   Development of severe pain      FOR APPOINTMENTS OR QUESTIONS CALL:    [facility] [address] [place] [address]  [phone]

## 2022-02-03 NOTE — BRIEF OP NOTE
Brief Postoperative Note    Patient: Fernanda Baez  YOB: 1980  MRN: [id number]    Date of Procedure: 2/3/2022     Pre-Op Diagnosis: MYELOPATHY/SPINAL CORD COMPRESSION    Post-Op Diagnosis: same    Procedure(s):  C3-4, C4-5, C5-6 ANTERIOR CERVICAL DISCECTOMY AND  FUSION     Surgeon(s):  Mario Lira MD    Surgical Assistant: Surg Asst-1: Christian Lubin  Surg Asst 2: Landen Graff (student)    Anesthesia: General     Estimated Blood Loss (mL): 08LE    Complications: none    Specimens: * No specimens in log *     Implants:   Implant Name Type Inv. Item Serial No.  Lot No. LRB No. Used Action   GRAFT BNE [employer] --  - [id number]  GRAFT BNE [employer] --  [id number] [employer] [id number] N/A 1 Implanted   CAGE SPNL [id number] PEEK GARDENIA CERV FUS PTC - SNA  CAGE SPNL [id number] PEEK GARDENIA CERV FUS PTC NA [employer] DANEK_WD [id number] N/A 1 Implanted   CAGE SPNL [id number] PEEK GARDENIA CERV FUS PTC - SNA  CAGE SPNL [id number] PEEK GARDENIA CERV FUS PTC NA [employer] DANEK_WD [id number] N/A 1 Implanted   CAGE SPNL [id number] PEEK GARDENIA CERV FUS PTC - SN/A  CAGE SPNL [id number] PEEK GARDENIA CERV FUS PTC N/A [employer]ANE[employer]_WD [id number] N/A 1 Implanted   PLATE SPNL [id number] ANT CERV ATLNTS VISN ELITE - SN/A  PLATE SPNL [id number] ANT CERV ATLNTS VISN ELITE N/A [employer] DANEK[employer]WD N/A N/A 1 Implanted   SCREW SPNL L15MM DIA4MM CANC ANT CERV TI SELF DRL BRIONNA ANG - SN/A  SCREW SPNL L15MM DIA4MM CANC ANT CERV TI SELF DRL BRIONNA ANG N/A [employer] DANEK_WD N/A N/A 8 Implanted       Drains:   Hemovac Anterior Neck (Active)   Site Assessment Clean, dry, & intact 02/03/22 1116   Dressing Status Clean, dry, & intact 02/03/22 1116   Drainage Description Serosanguinous 02/03/22 1116   Status Patent; Charged 02/03/22 1116       Findings: severe stenosis due to large disc herniations at C4/5 and C5/6    Electronically Signed by [address] [place], [place] [address]Bill  Sarah Ortiz MD on 2/3/2022 at 12:04 PM

## 2022-02-04 LAB
ANION GAP SERPL CALC-SCNC: 5 MMOL/L (ref 5–15)
BASOPHILS # BLD: 0 K/UL (ref 0–0.1)
BASOPHILS NFR BLD: 0 % (ref 0–1)
BUN SERPL-MCNC: 12 MG/DL (ref 6–20)
BUN/CREAT SERPL: 15 (ref 12–20)
CALCIUM SERPL-MCNC: 8.4 MG/DL (ref 8.5–10.1)
CHLORIDE SERPL-SCNC: 105 MMOL/L (ref 97–108)
CO2 SERPL-SCNC: 25 MMOL/L (ref 21–32)
CREAT SERPL-MCNC: 0.8 MG/DL (ref 0.55–1.02)
DIFFERENTIAL METHOD BLD: ABNORMAL
EOSINOPHIL # BLD: 0 K/UL (ref 0–0.4)
EOSINOPHIL NFR BLD: 0 % (ref 0–7)
ERYTHROCYTE [DISTWIDTH] IN BLOOD BY AUTOMATED COUNT: 14.1 % (ref 11.5–14.5)
GLUCOSE SERPL-MCNC: 94 MG/DL (ref 65–100)
HCT VFR BLD AUTO: 33.8 % (ref 35–47)
HGB BLD-MCNC: 11.3 G/DL (ref 11.5–16)
IMM GRANULOCYTES # BLD AUTO: 0.1 K/UL (ref 0–0.04)
IMM GRANULOCYTES NFR BLD AUTO: 1 % (ref 0–0.5)
LYMPHOCYTES # BLD: 2.4 K/UL (ref 0.8–3.5)
LYMPHOCYTES NFR BLD: 24 % (ref 12–49)
MCH RBC QN AUTO: 36.9 PG (ref 26–34)
MCHC RBC AUTO-ENTMCNC: 33.4 G/DL (ref 30–36.5)
MCV RBC AUTO: 110.5 FL (ref 80–99)
MONOCYTES # BLD: 0.7 K/UL (ref 0–1)
MONOCYTES NFR BLD: 7 % (ref 5–13)
NEUTS SEG # BLD: 6.8 K/UL (ref 1.8–8)
NEUTS SEG NFR BLD: 68 % (ref 32–75)
NRBC # BLD: 0 K/UL (ref 0–0.01)
NRBC BLD-RTO: 0 PER 100 WBC
PLATELET # BLD AUTO: 201 K/UL (ref 150–400)
PMV BLD AUTO: 10.1 FL (ref 8.9–12.9)
POTASSIUM SERPL-SCNC: 4 MMOL/L (ref 3.5–5.1)
RBC # BLD AUTO: 3.06 M/UL (ref 3.8–5.2)
RBC MORPH BLD: ABNORMAL
SODIUM SERPL-SCNC: 135 MMOL/L (ref 136–145)
WBC # BLD AUTO: 10 K/UL (ref 3.6–11)

## 2022-02-04 PROCEDURE — 74011250637 HC RX REV CODE- 250/637: Performed by: NEUROLOGICAL SURGERY

## 2022-02-04 PROCEDURE — 74011000250 HC RX REV CODE- 250: Performed by: NEUROLOGICAL SURGERY

## 2022-02-04 PROCEDURE — 51798 US URINE CAPACITY MEASURE: CPT

## 2022-02-04 PROCEDURE — 97530 THERAPEUTIC ACTIVITIES: CPT | Performed by: OCCUPATIONAL THERAPIST

## 2022-02-04 PROCEDURE — 97168 OT RE-EVAL EST PLAN CARE: CPT | Performed by: OCCUPATIONAL THERAPIST

## 2022-02-04 PROCEDURE — 85025 COMPLETE CBC W/AUTO DIFF WBC: CPT

## 2022-02-04 PROCEDURE — 74011250636 HC RX REV CODE- 250/636: Performed by: NEUROLOGICAL SURGERY

## 2022-02-04 PROCEDURE — 97164 PT RE-EVAL EST PLAN CARE: CPT

## 2022-02-04 PROCEDURE — 74011250636 HC RX REV CODE- 250/636: Performed by: NURSE PRACTITIONER

## 2022-02-04 PROCEDURE — 74011250637 HC RX REV CODE- 250/637: Performed by: NURSE PRACTITIONER

## 2022-02-04 PROCEDURE — 77010033678 HC OXYGEN DAILY

## 2022-02-04 PROCEDURE — 97530 THERAPEUTIC ACTIVITIES: CPT

## 2022-02-04 PROCEDURE — 74011250637 HC RX REV CODE- 250/637: Performed by: STUDENT IN AN ORGANIZED HEALTH CARE EDUCATION/TRAINING PROGRAM

## 2022-02-04 PROCEDURE — 36415 COLL VENOUS BLD VENIPUNCTURE: CPT

## 2022-02-04 PROCEDURE — 65270000029 HC RM PRIVATE

## 2022-02-04 PROCEDURE — 80048 BASIC METABOLIC PNL TOTAL CA: CPT

## 2022-02-04 RX ORDER — POLYETHYLENE GLYCOL 3350 17 G/17G
17 POWDER, FOR SOLUTION ORAL
Qty: 15 PACKET | Refills: 0 | Status: SHIPPED | OUTPATIENT
Start: 2022-02-04 | End: 2022-02-19

## 2022-02-04 RX ORDER — OXYCODONE HYDROCHLORIDE 5 MG/1
5 TABLET ORAL
Qty: 30 TABLET | Refills: 0 | Status: SHIPPED | OUTPATIENT
Start: 2022-02-04 | End: 2022-02-13

## 2022-02-04 RX ORDER — SODIUM CHLORIDE 9 MG/ML
75 INJECTION, SOLUTION INTRAVENOUS CONTINUOUS
Status: DISPENSED | OUTPATIENT
Start: 2022-02-04 | End: 2022-02-05

## 2022-02-04 RX ORDER — AMOXICILLIN 250 MG
2 CAPSULE ORAL
Status: DISCONTINUED | OUTPATIENT
Start: 2022-02-04 | End: 2022-02-07

## 2022-02-04 RX ORDER — ENOXAPARIN SODIUM 100 MG/ML
30 INJECTION SUBCUTANEOUS EVERY 12 HOURS
Status: DISCONTINUED | OUTPATIENT
Start: 2022-02-05 | End: 2022-02-13 | Stop reason: HOSPADM

## 2022-02-04 RX ORDER — AMOXICILLIN 250 MG
1 CAPSULE ORAL DAILY
Qty: 30 TABLET | Refills: 0 | Status: SHIPPED | OUTPATIENT
Start: 2022-02-04

## 2022-02-04 RX ORDER — DICLOFENAC SODIUM 10 MG/G
2 GEL TOPICAL 4 TIMES DAILY
Status: DISCONTINUED | OUTPATIENT
Start: 2022-02-04 | End: 2022-02-13 | Stop reason: HOSPADM

## 2022-02-04 RX ORDER — POLYETHYLENE GLYCOL 3350 17 G/17G
17 POWDER, FOR SOLUTION ORAL DAILY
Status: DISCONTINUED | OUTPATIENT
Start: 2022-02-04 | End: 2022-02-07

## 2022-02-04 RX ORDER — ENOXAPARIN SODIUM 100 MG/ML
40 INJECTION SUBCUTANEOUS DAILY
Status: DISCONTINUED | OUTPATIENT
Start: 2022-02-04 | End: 2022-02-04

## 2022-02-04 RX ADMIN — DICLOFENAC SODIUM 2 G: 10 GEL TOPICAL at 17:09

## 2022-02-04 RX ADMIN — OXYCODONE 10 MG: 5 TABLET ORAL at 04:17

## 2022-02-04 RX ADMIN — SODIUM CHLORIDE 125 ML/HR: 9 INJECTION, SOLUTION INTRAVENOUS at 04:17

## 2022-02-04 RX ADMIN — SODIUM CHLORIDE 75 ML/HR: 9 INJECTION, SOLUTION INTRAVENOUS at 21:58

## 2022-02-04 RX ADMIN — Medication: at 21:22

## 2022-02-04 RX ADMIN — OXYCODONE 10 MG: 5 TABLET ORAL at 15:47

## 2022-02-04 RX ADMIN — ENOXAPARIN SODIUM 40 MG: 100 INJECTION SUBCUTANEOUS at 11:20

## 2022-02-04 RX ADMIN — DICLOFENAC SODIUM 2 G: 10 GEL TOPICAL at 14:07

## 2022-02-04 RX ADMIN — ACETAMINOPHEN 650 MG: 325 TABLET ORAL at 06:32

## 2022-02-04 RX ADMIN — Medication: at 15:47

## 2022-02-04 RX ADMIN — POLYETHYLENE GLYCOL 3350 17 G: 17 POWDER, FOR SOLUTION ORAL at 13:16

## 2022-02-04 RX ADMIN — SODIUM CHLORIDE, PRESERVATIVE FREE 10 ML: 5 INJECTION INTRAVENOUS at 14:09

## 2022-02-04 RX ADMIN — SODIUM CHLORIDE, PRESERVATIVE FREE 10 ML: 5 INJECTION INTRAVENOUS at 21:25

## 2022-02-04 RX ADMIN — PHENOL 1 SPRAY: 1.5 LIQUID ORAL at 19:17

## 2022-02-04 RX ADMIN — Medication 1 AMPULE: at 21:20

## 2022-02-04 RX ADMIN — Medication 1 AMPULE: at 08:51

## 2022-02-04 RX ADMIN — SODIUM CHLORIDE, PRESERVATIVE FREE 10 ML: 5 INJECTION INTRAVENOUS at 06:33

## 2022-02-04 RX ADMIN — LEVOTHYROXINE SODIUM 50 MCG: 0.05 TABLET ORAL at 06:32

## 2022-02-04 RX ADMIN — OXYCODONE 10 MG: 5 TABLET ORAL at 11:20

## 2022-02-04 RX ADMIN — DICLOFENAC SODIUM 2 G: 10 GEL TOPICAL at 21:21

## 2022-02-04 RX ADMIN — OXYCODONE 10 MG: 5 TABLET ORAL at 19:17

## 2022-02-04 NOTE — PROGRESS NOTES
Problem: Self Care Deficits Care Plan (Adult)  Goal: *Acute Goals and Plan of Care (Insert Text)  Description: FUNCTIONAL STATUS PRIOR TO ADMISSION: Lives with her mother, who is able to care for herself, but can not take care of the patient. History of frequent falls due to her R knee \"giving out\". Sleeping on the sofa. She indicates that the can not use a RW in the house \"because there is not enough room for it. \" She reports having difficulty getting dressed; likely does not get dressed every day. HOME SUPPORT: Mother    Occupational Therapy Goals  2/4/2022:  Goals were not achieved. All Goals remain appropriate to continue for 7 days,. Initiated 1/29/2022  1. Patient will perform grooming standing up to 2 minutes with contact guard assist within 7 day(s). 2.  Patient will perform lower body dressing with minimal assistance using AE as needed within 7 day(s). 3.  Patient will perform toilet transfers with supervision/SBA within 7 day(s). 4.  Patient will perform all aspects of toileting with minimal assistance within 7 day(s). 5.  Patient will participate in upper extremity therapeutic exercise/activities with Min cues for 10 minutes within 7 day(s). Outcome: Not Met   OCCUPATIONAL THERAPY TREATMENT RE-EVALUATION/treatment  Patient: Khushbu Monahan (31 y.o. female)  Date: 2/4/2022  Diagnosis: Falls [W19. XXXA]  Inability to ambulate due to multiple joints [R26.2]  Cervical radiculopathy [M54.12]  Right knee pain [M25.561] <principal problem not specified>  Procedure(s) (LRB):  C3-4, C4-5, C5-6 ANTERIOR CERVICAL DISCECTOMY AND  FUSION  (N/A) 1 Day Post-Op  Precautions: Fall, R knee buckling   Chart, occupational therapy assessment, plan of care, and goals were reviewed. ASSESSMENT  Based on the objective data described below, pt has not achieved established goals and they remain appropriate to continue. Pt required moderate assistance X2  for bed mobility and assist X2 for standing.   Took small side steps up to [facility], but on second standing trial (pt initiated) pt R knee buckled and was returned to bed with assist X3 to prevent sliding off bed. Total assistance was needed to return pt to bed, and scoot up appropriately at bed level. Pt was able to reach and  (reported improved sensation)  the bed rails as well as hold the RW, however, she tended to raise the right side of the RW while standing (instead of pushing down for support). Pt will need rehab at discharge. Neck brace was placed on pt (total assistance) during tx session (removed soft collar) pt encouraged to wear the brace at all times. Pt demonstrated good participation with encouragement and will benefit from Inpatient rehab at discharge. Nursing performing care at end of tx session. Current Level of Function Impacting Discharge (ADLs): up to total assistance for self care. Assist X2 to 3 for mobility this date    Other factors to consider for discharge: POD 1 ACDF--OT Reevaluation this date. PLAN :  Goals have been updated based on progression since last assessment. Patient continues to benefit from skilled intervention to address the above impairments. Continue to follow patient 4 times a week to address goals. Recommend with staff: encourage upright in chair like position at bed level.         Recommendation for discharge: (in order for the patient to meet his/her long term goals)  Therapy 3 hours per day 5-7 days per week    This discharge recommendation:  Has not yet been discussed the attending provider and/or case management    Equipment recommendations for successful discharge (if) home: tbd in rehab       SUBJECTIVE:   Patient \"I'll do it again\"  perform sit to stand    OBJECTIVE DATA SUMMARY:   Cognitive/Behavioral Status:  Neurologic State: Alert  Orientation Level: Appropriate for age  Cognition: Follows commands  Perception: Appears intact          Functional Mobility and Transfers for ADLs:  Bed Mobility:  Rolling: Moderate assistance  Supine to Sit: Moderate assistance;Assist x2  Sit to Supine: Total assistance;Assist x2  Scooting: Maximum assistance    Transfers:  Sit to Stand: Moderate assistance;Assist x2  Functional Transfers  Bathroom Mobility:  (not performed)       Balance:  Sitting: Impaired  Sitting - Static: Good (unsupported); Supported sitting  Sitting - Dynamic: Fair (occasional); Supported sitting  Standing: Impaired; With support  Standing - Static: Constant support; Fair  Standing - Dynamic : Constant support;Poor    ADL Intervention:   Provided comb for hair, but pt too distracted to participate. Pain:  No complaints, during upright.   C/o discomfort during transitions (wearing neck brace) but did not rate    Activity Tolerance:   BP stable in sitting    After treatment patient left in no apparent distress:   Supine in bed, Call bell within reach, Side rails x 3, and nursing providing care    COMMUNICATION/COLLABORATION:   The patients plan of care was discussed with: Physical Therapist and Registered Nurse    Elsy Dumont OTR/L  Time Calculation: 23 mins

## 2022-02-04 NOTE — OP NOTES
[address]  OPERATIVE REPORT    Name:  Camilla Coleman  MR#:  [id number]  :  1980  ACCOUNT #:  [de-identified]  DATE OF SERVICE:  2022    PREOPERATIVE DIAGNOSES:  Myelopathy with spinal cord compression. POSTOPERATIVE DIAGNOSES:  Myelopathy with spinal cord compression. PROCEDURE PERFORMED:  Anterior cervical discectomy, decompression and fusion, C3-C4, C4-C5, and C5-C6. SURGEON:  Willy Glynn MD    ASSISTANT:  Ortiz Pritchard (student). ANESTHESIA:  General.    COMPLICATIONS:  None. SPECIMENS REMOVED:  None. IMPLANTS:  Please see operative record. ESTIMATED BLOOD LOSS:  30 mL. DRAINS:  Hemovac. FINDINGS:  Severe stenosis due to large disk herniation at C4-C5 and C5-C6. INDICATIONS FOR SURGERY:  This is a 69-year-old woman who has had progressive decline in her ability to walk over several months. Just prior to admission, she was unable to get up off the couch secondary to weakness in her legs. She has also noticed increased weakness in her hands, right greater than left, with inability to use them for many ADLs. Workup in the hospital included an MRI of the cervical spine, which showed severe spinal stenosis with cord signal change at C4-C5 and C5-C6 due to large disk herniation. There is moderate-to-severe spinal stenosis without cord signal change at C3-C4. Risks and benefits of surgical decompression and fusion were discussed. She understood these and agreed to proceed. PROCEDURE:  The patient was brought into the operating room. She was placed under general endotracheal anesthesia. I had her endotracheal cuff inflated to minimal inflation. The patient has a very large body habitus. Her BMI is 54.87. I did tape the arms and shoulders, but we were only able to see well at the C3-C4 levels and through the shoulders at C4-C5 and had poor visualization of C5-C6 level. She was sterilely prepped and draped in a standard fashion. I  marked a transverse incision going from midline to the medial part of the sternocleidomastoid muscle. She received 3 g of Ancef within one hour prior to incision. She had SCDs on and functioning during the entire case. After she was sterilely prepped and draped, I incised the skin with a #10 blade. I used self-retaining retractors. I found the platysma. I elevated it, bipolared it, divided it and undermined it rostrally and caudally for better exposure. There was a very large external jugular that I saved and I dissected this out and continued the dissection medial to the sternocleidomastoid muscle, medial to the carotid to the anterior cervical spine. I used handheld retractors. I bipolared the attachments and the longus colli. Self-retaining retractors with teeth were placed. I started at the C4-C5 levels because this was her most significant stenosis. After placing the self retaining retractors with teeth, I used a 11-blade to incise the disk space and removed disk material with a series of curette and pituitaries. At the C4-C5 level, she had marked instability. I placed distraction pins into the bodies at C4 and C5. I used a 3-mm Kerrison to remove the anterior osteophytes from the anterior body of C4. I continued dissection to the posterior osteophytes. I then brought in the operating microscope. I placed gentle distraction across the disk space. I removed large disk fragments from behind the vertebral bodies. I elevated the posterior longitudinal ligament and removed this also with 1-mm Kerrison. I used 2-mm Kerrisons t o decompress the foramen. I removed cartilaginous end plates with the curette. This was to provide good fusion surface. 6-mm and 7-mm trials were placed. I felt the 6-mm had the best fit. Therefore, a 6-mm PEEK interbody device with titanium on the endplates and Dana (nonstructural allograft) was placed with good positioning verified with intraoperative x-ray. I removed the distraction from the disk space. I did remove the distraction pin from C4 and then moved this down to C6. We did move down our retractors at the same time. I placed distraction pin into the body of C6. I incised the disk space with a #11 blade. I removed disk material with a series of curette and pituitary. I placed distraction across the disk space. At this level also, there was a very large disk herniation that was posterior to the longitudinal ligament. I removed the ligament and there were several disk fragments within the canal and it demonstrated good decompression when I finished. I removed posterior osteophytes with a 1-mm Kerrison. I performed foraminotomies with 2-mm Kerrison. I removed cartilaginous end plates to provide good fusion surfaces. I then placed 6 and 7-mm trials, it was felt that 7-mm had the best fit. Therefore, a 7-mm PEEK interbody device, titanium on the end plate and Dana (nonstructural allograft) was placed. Good positioning and we could barely see this on the imaging, but I could see approximately where the device is. Distraction was taken off the disk space. The distraction pins removed. I then turned my attention to the C3-C4. I put distraction pin back into the site at C4. I replaced my retractors under the longus colli at the C3-C4 level. A distraction pin was placed into the body of C3. I used an #11 blade to incise the disk space. Disk material was removed with a series of curette and Kerrisons. Anterior osteophyte was removed from the anterior portion of the body of C3 with a 2-mm Kerrison. I removed all disk material  with a series of curette and pituitary from the posterior longitudinal ligament. This was elevated and removed with a 1-mm Kerrison. As per posterior osteophytes, I performed foraminotomies with 2-mm  Kerrison bilaterally. There was good decompression when I finished.   I removed the cartilaginous end plates with a curette. I placed both the 6 and 7-mm trials, 6 mm had the best fit. Therefore, a 6-mm PEEK interbody device with titanium on the endplates and Dana (nonstructural allograft) was placed with good position verified with intraoperative x-rays. Then, I turned my attention to placing a plate. A 60-mm plate was placed. I used 15-mm variable angle screws 4 mm in diameter. Bilaterally at C3, C4, C5, and C6, there was good positioning of the plate and had good purchase of all screws. Locking mechanisms were tightened. The wound was copiously irrigated. I used over 500 mL of irrigation during the case. A Hemovac drain was placed, it was tunneled underneath the skin. I reapproximated the platysma with 2-0 Vicryl and the skin with Monocryl. She tolerated the surgery well, was taken to the postoperative care unit.         Bria Hicks MD      [id number]  D:  02/03/2022 12:25  T:  02/03/2022 20:55  JOB #:  [id number]

## 2022-02-04 NOTE — PROGRESS NOTES
Patient is refusing to keep her [place] collar on. She has been educated several times by this RN and the previous RN why it is important to keep it on. She continues to pull it off and throw it on the floor. She stated that she is able to keep her neck still and that she has been very deliberate with her movement.

## 2022-02-04 NOTE — PROGRESS NOTES
Transition of Care Plan:  RUR: 11% low   Disposition: IPR-Referrals sent on 1/29/22 to facilities: acceptance pending and insurance auth required   Follow up appointments: Follow up with PCP and/or Specialist   DME needed: Facility will provide   Transportation at [address] required   [address] or means to access home:    N/A     Medicare Letter: N/A  Is patient a BCPI-A Bundle:  CM will provide if required                     If yes, was Bundle Letter given?:    Is patient a [place] and connected with the [address]? N/A               If yes, was [employer] transfer form completed and [facility] notified? Caregiver Contact: Mili Jordan (mother) [phone]  Discharge Caregiver contacted prior to discharge? Family to be contacted  Care Conference needed?:  Not at this time    [facility] and [facility] referrals still pending. [facility] declined. Both [facility] and [facility] wanting to see pt's therapy progress post op, which is still pending.      Jordon Noel, [facility]

## 2022-02-04 NOTE — PROGRESS NOTES
Hospitalist Progress Note    NAME: Simona Garcia   :  1980   MRN:  [id number]       Assessment / Plan:  Severe Cervical Spinal stenosis with Radiculopathy POA  Myelopathy with spinal cord compression  S/p anterior cervical discectomy decompression and fusionC3-C4, C4-C5 and C5-C6-2/3. Right knee pain POA  Recurrent falls. Physical deconditioning  History of arthritis  H/o L ankle Fracture s/p ORIF at [address] [place] [address] ortho  Hypothyroidism (newly diagnosed) POA  Morbid obesity POA  Tobacco abuse   MRI C Spine=  1. Disc protrusions C4-C5 and C5-C6, with severe canal stenosis and cord  flattening. 2. Probable abnormal cord signal C4-C5. Possible abnormal cord signal C5-C6. 3. Moderate canal stenosis C3-C4. 4. Neural foraminal stenoses: bilateral C3-C4, right and severe left C4-C5, left  C5-C6. Pt was under observation stay- changed to IP  after 2 [place] stay - failed outpatient management/treatment  Started on Mobic per recommendations of Orthopedic team- cont daily scheduled  Cont PO oxycodone prn severe pain only- avoid as able  Cont falls precautions  IP PT OT consult noted-- recommends IPR  IP  consulted- working on [employer] placement  IP Ortho consult appreciated-   - Generalized bony knee pain due to overload - NO KNEE intervention recommended  -  Wt loss through dietary changes   -  Considering adding once a day NSAID- Celebrex or mobic  - Rehab for upper and lower body strengthening  - AFO if R foot drop persists  - IP Neurology consulted for recommendations on R Foot drop- noted, MRI C & L spine ordered. Neurosurgery on boarddid the surgery yesterday has mentioned above. Postop day 1. PT and OT recommended inpatient rehab. Case management notified. Patient medically stable for discharge to the inpatient rehab.   HbA1c- 5.6  TSH = 11.3, started on Synthroid daily, Outpatient TSH as outpatient in 3 weeks for further dose titration  Patient declined nicotine patch.     Code Status: Full code  Surrogate Decision Maker: Mother 79 yrs old at home who cant help physically     DVT Prophylaxis: Lovenox  GI Prophylaxis: not indicated     Baseline: Ambulatory    Estimated discharge date: February 5  Barriers: Dispositioninpatient rehab    Recommended Disposition: [facility]/Rehab being looked into  PCR covid test (1/31) pending for placement     Subjective:     Chief Complaint / Reason for Physician Visit:  Patient seen today, complaining of the neck pain after the procedure, has a drain put in, also complaining the pain in the knee. Patient seems motivated to work with PT. Review of Systems:  Symptom Y/N Comments  Symptom Y/N Comments   Fever/Chills n   Chest Pain n    Poor Appetite n   Edema n    Cough n   Abdominal Pain n    Sputum n   Joint Pain y R Knee   SOB/BECKETT n   Pruritis/Rash n    Nausea/vomit n   Tolerating PT/OT y    Diarrhea    Tolerating Diet y    Constipation    Other       Could NOT obtain due to:      Objective:     VITALS:   Last 24hrs VS reviewed since prior progress note. Most recent are:  Patient Vitals for the past 24 hrs:   Temp Pulse Resp BP SpO2   02/04/22 1213 98.4 °F (36.9 °C) (!) 102 18 137/70 95 %   02/04/22 0734 99 °F (37.2 °C) (!) 103 18 (!) 143/74 95 %   02/03/22 2310 97.8 °F (36.6 °C) 84 18 (!) 144/55 95 %   02/03/22 1934 97.5 °F (36.4 °C) (!) 102 18 (!) 143/70 93 %   02/03/22 1844    135/69    02/03/22 1730 98 °F (36.7 °C) 74  (!) 122/58 98 %   02/03/22 1601 98.1 °F (36.7 °C) 74 18 (!) 102/49 96 %   02/03/22 1530 98 °F (36.7 °C) 86 16 124/80 97 %   02/03/22 1435 98.2 °F (36.8 °C) 94 18 (!) 108/48 93 %   02/03/22 1400 99.1 °F (37.3 °C) 86 18 (!) 105/45 93 %       Intake/Output Summary (Last 24 hours) at 2/4/2022 1346  Last data filed at 2/4/2022 0900  Gross per 24 hour   Intake 985.42 ml   Output 325 ml   Net 660.42 ml        I had a face to face encounter and independently examined this patient on 2/4/2022, as outlined below:  PHYSICAL EXAM:  General: WD, WN.  Alert, cooperative, no acute distress, Morbid Obese +    EENT:  EOMI. Anicteric sclerae. MMM  Resp:  CTA bilaterally, no wheezing or rales. No accessory muscle use  CV:  Regular  rhythm,  No edema  GI:  Soft, Non distended, Non tender. +Bowel sounds  Neurologic:  Alert and oriented X 3, normal speech,   Psych:   Good insight. Not anxious nor agitated  Skin:  No rashes. No jaundice    Reviewed most current lab test results and cultures  YES  Reviewed most current radiology test results   YES  Review and summation of old records today    NO  Reviewed patient's current orders and MAR    YES  PMH/SH reviewed - no change compared to H&P  ________________________________________________________________________  Care Plan discussed with:    Comments   Patient x    Family      RN x    Care Manager     Consultant                       x Multidiciplinary team rounds were held today with , nursing, pharmacist and clinical coordinator. Patient's plan of care was discussed; medications were reviewed and discharge planning was addressed. ________________________________________________________________________  Total NON critical care TIME:  30   Minutes    Total CRITICAL CARE TIME Spent:   Minutes non procedure based      Comments   >50% of visit spent in counseling and coordination of care     ________________________________________________________________________  Arturo Lord MD     Procedures: see electronic medical records for all procedures/Xrays and details which were not copied into this note but were reviewed prior to creation of Plan. LABS:  I reviewed today's most current labs and imaging studies.   Pertinent labs include:  Recent Labs     02/04/22  0426   WBC 10.0   HGB 11.3*   HCT 33.8*        Recent Labs     02/04/22  0426 02/02/22  1022   *  --    K 4.0  --      --    CO2 25  --    GLU 94  --    BUN 12  --    CREA 0.80  --    CA 8.4*  --    INR  --  1.0       Signed: Gail Huerta MD

## 2022-02-04 NOTE — PROGRESS NOTES
Problem: Mobility Impaired (Adult and Pediatric)  Goal: *Acute Goals and Plan of Care (Insert Text)  Description: FUNCTIONAL STATUS PRIOR TO ADMISSION: recurrent falls due to R knee buckling; has difficulty getting off sofa where she sleeps; reports home is too cluttered to use a RW. HOME SUPPORT PRIOR TO ADMISSION: The patient lived with mother in 1 story home with 4 steps to enter and unstable rails. Mother assists patient with ADLs and meals. Physical Therapy Goals  Initiated 1/29/2022. Re-Eval 2/4/2022 due to ACDF, goals remain appropriate. 1.  Patient will move from supine to sit and sit to supine , scoot up and down, and roll side to side in bed with supervision/set-up within 7 day(s). 2.  Patient will transfer from bed to chair and chair to bed with minimal assistance/contact guard assist using the least restrictive device within 7 day(s). 3.  Patient will perform sit to stand with minimal assistance/contact guard assist within 7 day(s). 4.  Patient will ambulate with minimal assistance/contact guard assist for 35 feet with the least restrictive device within 7 day(s). 5.  Patient will ascend/descend 4 stairs with 1 handrail(s) with minimal assistance/contact guard assist within 7 day(s). Outcome: Progressing Towards Goal   PHYSICAL THERAPY REEVALUATION  Patient: Quang Urias (72 y.o. female)  Date: 2/4/2022  Primary Diagnosis: Falls [W19. XXXA]  Inability to ambulate due to multiple joints [R26.2]  Cervical radiculopathy [M54.12]  Right knee pain [M25.561]  Procedure(s) (LRB):  C3-4, C4-5, C5-6 ANTERIOR CERVICAL DISCECTOMY AND  FUSION  (N/A) 1 Day Post-Op   Precautions:   Fall, Hard C-Collar with all mobility, high anxiety *REQUIRES 3 PERSONS TO STAND*      ASSESSMENT  Based on the objective data described below, the patient presents with impairments in strength, activity tolerance, and overall mobility below her functional baseline.  Pt presents for re-evaluation today due to new C3-7 ACDF on 2/2/2022. Pt received supine in bed, soft collar on, agreeable to PT session with encouragement. Education provided on need for hard collar for all mobility and changed soft to hard collar with dependent A x2-3. Pt performed rolling and supine > sit edge of bed Mod A x2. Sitting balance intact edge of bed with propped sit. Vitals stable throughout. Sit <> stand edge of bed with Mod A x2 and RW. Pt with difficulty holding onto RW requiring assist under armpits. Side stepping x3ft to [facility] with Mod a x2 and RW in front. After seated rest, pt trialed 2nd stand with Mod A x2. R knee buckling with attempted return to sit, requiring Total assist x3 to return to supine and prevent fall. Total A x2-3 to boost in bed for further nursing care. Pt continues to benefit from therapy services, will need IP Rehab stay upon DC for further mobility intervention. Current Level of Function Impacting Discharge (mobility/balance): Mod-Max A x2-3     Functional Outcome Measure: The patient scored 30/100 on the Barthel Index outcome measure which is indicative of 50% dependence in mobility and ADLs. Other factors to consider for discharge: complex medical history, progressive myelitis     Patient will benefit from skilled therapy intervention to address the above noted impairments. PLAN :  Recommendations and Planned Interventions: bed mobility training, transfer training, gait training, therapeutic exercises, neuromuscular re-education, patient and family training/education, and therapeutic activities      Frequency/Duration: Patient will be followed by physical therapy:  daily to address goals.     Recommendation for discharge: (in order for the patient to meet his/her long term goals)  Therapy 3 hours per day 5-7 days per week    This discharge recommendation:  Has been made in collaboration with the attending provider and/or case management    Equipment recommendations for successful discharge (if) home: to be determined         SUBJECTIVE:   Patient stated I want to try (standing).     OBJECTIVE DATA SUMMARY:   HISTORY:    Past Medical History:   Diagnosis Date    Arthritis     Hypothyroidism      Past Surgical History:   Procedure Laterality Date    HX ANKLE FRACTURE TX Left     ORIF     Hospital course since last seen and reason for reevaluation: new ACDF on 2/2/2022 with reports in improved sensation in B UE and R LE. Pt stating today \"I can feel my feet again, but my legs are weird under me\"    Personal factors and/or comorbidities impacting plan of care: fall history PTA. Home Situation  Home Environment: Private residence  # Steps to Enter: 4  Rails to Enter: Yes  Hand Rails : Bilateral (unstable)  One/Two Story Residence: One story  Living Alone: No  Support Systems: Parent(s)  Patient Expects to be Discharged to[de-identified] Rehab hospital/unit acute  Current DME Used/Available at Home: Crutches,Walker, rolling  Tub or Shower Type: Tub/Shower combination    EXAMINATION/PRESENTATION/DECISION MAKING:   Critical Behavior:  Neurologic State: Alert,Appropriate for age  Orientation Level: Oriented X4  Cognition: Follows commands,Appropriate safety awareness,Appropriate for age attention/concentration  Safety/Judgement: Awareness of environment,Fall prevention,Insight into deficits,Decreased insight into deficits (learning about deficits; anxious re deficits)  Hearing: Auditory  Auditory Impairment: None  Hearing Aids/Status: Does not own  Skin:  noted R posterior/lateral thigh blister, now popped. RN aware and applied cream during session. Edema: none noted. Range Of Motion:  AROM: Grossly decreased, non-functional                       Strength:    Strength: Grossly decreased, non-functional                    Tone & Sensation:   Tone: Normal              Sensation: Impaired               Coordination:  Coordination: Generally decreased, functional  Vision:      Functional Mobility:  Bed Mobility:  Rolling:  Moderate assistance  Supine to Sit: Moderate assistance;Assist x2  Sit to Supine: Total assistance;Assist x2  Scooting: Maximum assistance  Transfers:  Sit to Stand: Moderate assistance;Assist x2  Stand to Sit: Moderate assistance;Assist x2                       Balance:   Sitting: Impaired  Sitting - Static: Good (unsupported); Supported sitting  Sitting - Dynamic: Fair (occasional); Supported sitting  Standing: Impaired; With support  Standing - Static: Constant support; Fair  Standing - Dynamic : Constant support;Poor  Ambulation/Gait Training:  Distance (ft): 3 Feet (ft) side stepping towards head of bed. Assistive Device: Gait belt;Walker, rolling  Ambulation - Level of Assistance: Moderate assistance;Assist x2        Gait Abnormalities: Decreased step clearance;Shuffling gait        Base of Support: Widened     Speed/Sabiha: Shuffled (side stepping x3 to left/ HOB)  Step Length: Left shortened;Right shortened        Functional Measure:  Barthel Index:    Bathin  Bladder: 5  Bowels: 5  Groomin  Dressin  Feeding: 10  Mobility: 0  Stairs: 0  Toilet Use: 0  Transfer (Bed to Chair and Back): 5  Total: 30/100       The Barthel ADL Index: Guidelines  1. The index should be used as a record of what a patient does, not as a record of what a patient could do. 2. The main aim is to establish degree of independence from any help, physical or verbal, however minor and for whatever reason. 3. The need for supervision renders the patient not independent. 4. A patient's performance should be established using the best available evidence. Asking the patient, friends/relatives and nurses are the usual sources, but direct observation and common sense are also important. However direct testing is not needed. 5. Usually the patient's performance over the preceding 24-48 hours is important, but occasionally longer periods will be relevant.   6. Middle categories imply that the patient supplies over 50 per cent of the effort. 7. Use of aids to be independent is allowed. Score Interpretation (from [address])    Independent   60-79 Minimally independent   40-59 Partially dependent   20-39 Very dependent   <20 Totally dependent     -Iggy Barker., Barthel, D.W. (1965). Functional evaluation: the Barthel Index. [facility] ([address] [place] [address] (1997). The Barthel activities of daily living index: self-reporting versus actual performance in the old (> or = 75 years). Journal of [address] [place] [address] 45(7), [facility], JJACKIE, Maeve Sweet., Lorenza Scott. (1999). Measuring the change in disability after inpatient rehabilitation; comparison of the responsiveness of the Barthel Index and Functional [place] Measure. Journal of Neurology, Neurosurgery, and Psychiatry, 66(4), 442-311. CHLOE Beltran, EVER Coles, & Jovana Fowler MBRUNO. (2004) Assessment of post-stroke quality of life in cost-effectiveness studies: The usefulness of the Barthel Index and the EuroQoL-5D. Quality of Life Research, 13, 427-43             Pain Rating:  Unable to rank. Activity Tolerance:   Fair and requires frequent rest breaks    After treatment patient left in no apparent distress:   Supine in bed, Call bell within reach and Side rails x 3, RN and tech providing care. COMMUNICATION/EDUCATION:   The patients plan of care was discussed with: Occupational therapist and Registered nurse. Fall prevention education was provided and the patient/caregiver indicated understanding.     Thank you for this referral.  Phillip Cruz, PT, DPT, NCS   Time Calculation: 35 mins

## 2022-02-04 NOTE — PROGRESS NOTES
Comprehensive Nutrition Assessment    Type and Reason for Visit: Initial,RD nutrition re-screen/LOS    Nutrition Recommendations/Plan:   Continue full liquid diet, advancing per surgery  Add Ensure HP TID  Please document % meals and supplements consumed in flowsheet I/O's under intake     Nutrition Assessment:      Chart reviewed. Pt noted for recurrent falls, ambulatory dysfunction, new hypothyroidism, severe cervical stenosis w/ radiculopathy and myelopathy, s/p C3-6 discectomy. Pt very anxious and hyper-focused on feeling like she cannot swallow d/t her neck brace, but she can indeed swallow without difficulty. Discussed diet options with her at this time and pt agreeable to add supplements. Continue to encourage intake of meals and supplements. Egg allergy charted. Pt also notes allergy to fish (not shellfish), and cranberry juice. She is also selectively lactose intolerant (can have cheese and pudding) and takes lactose pills at home. Wt Readings from Last 5 Encounters:   02/03/22 136.1 kg (300 lb)   01/25/22 132.5 kg (292 lb)   12/25/21 134.3 kg (296 lb)   ]    Estimated Daily Nutrient Needs:  Energy (kcal): 7076-1002 kcals (11-15 kcals/kg); Weight Used for Energy Requirements: Current  Protein (g): 109g (0.8g/kg); Weight Used for Protein Requirements: Current  Fluid (ml/day): 1500mL; Method Used for Fluid Requirements: 1 ml/kcal      Nutrition Related Findings:  Labs: reviewed. Meds: synthroid, miralax, pericolace, roxicodone. Edema: 1+BLE. BM 2/2. Wounds:    Surgical incision,Stage II,Pressure injury       Current Nutrition Therapies:  ADULT DIET Full Liquid  DIET ONE TIME MESSAGE  ADULT ORAL NUTRITION SUPPLEMENT Breakfast, Lunch, Dinner;  Low Calorie/High Protein  DIET ONE TIME MESSAGE    Anthropometric Measures:  · Height:  5' 2\" (157.5 cm)  · Current Body Wt:  136.1 kg (300 lb)   · Ideal Body Wt:  110 lbs:  272.7 %   · BMI Category:  Obese class 3 (BMI 40.0 or greater)       Nutrition Diagnosis:   · Increased nutrient needs related to  (habitus and wound healing) as evidenced by  (estimated protein needs >100g/day; BMI 55)      Nutrition Interventions:   Food and/or Nutrient Delivery: Continue current diet,Start oral nutrition supplement  Nutrition Education and Counseling: No recommendations at this time  Coordination of Nutrition Care: Continue to monitor while inpatient    Goals:  PO intake >50% meals and supplements next 4-6 days       Nutrition Monitoring and Evaluation:   Behavioral-Environmental Outcomes: None identified  Food/Nutrient Intake Outcomes: Diet advancement/tolerance,Food and nutrient intake,Supplement intake  Physical Signs/Symptoms Outcomes: Biochemical data,GI status,Weight,Skin    Discharge Planning:     Too soon to determine     Electronically signed by Gennaro Ace RD on 2/4/2022 at 1:44 PM    Contact: [phone]

## 2022-02-04 NOTE — PROGRESS NOTES
End of Shift Note    Bedside shift change report given to GERMÁN Berry (oncoming nurse) by Elmer Decker (offgoing nurse). Report included the following information SBAR, Kardex, MAR and Recent Results    Shift worked:  Day     Shift summary and any significant changes:     Patient refusing to wear cervical collar at all times. Soft collar wore while in bed per NP order. Blister on leg popped. akhil placed to skin after it was cleaned. Max assist with therapy. Due to void. Bladder scanned at 6 pm under 400 mL. Patient needs tylenol crushed in future attempted to given whole in applesauce was not successful. Concerns for physician to address:  needs to void     Zone phone for oncoming shift:   [phone]       Activity:  Activity Level: Up with Assistance  Number times ambulated in hallways past shift: 0  Number of times OOB to chair past shift: 0    Cardiac:   Cardiac Monitoring: No      Cardiac Rhythm: Sinus Rhythm    Access:   Current line(s): PIV     Genitourinary:   Urinary status: due to void    Respiratory:   O2 Device: None (Room air)  Chronic home O2 use?: NO  Incentive spirometer at bedside: YES  Actual Volume (ml): 750 ml  GI:  Last Bowel Movement Date: 02/02/22  Current diet:  ADULT DIET Full Liquid  DIET ONE TIME MESSAGE  ADULT ORAL NUTRITION SUPPLEMENT Breakfast, Lunch, Dinner; Low Calorie/High Protein  DIET ONE TIME MESSAGE  Passing flatus: YES  Tolerating current diet: YES       Pain Management:   Patient states pain is manageable on current regimen: YES    Skin:  Tru Score: 16  Interventions: float heels, increase time out of bed, PT/OT consult and limit briefs    Patient Safety:  Fall Score:  Total Score: 4  Interventions: gripper socks, pt to call before getting OOB and stay with me (per policy)  High Fall Risk: Yes    Length of Stay:  Expected LOS: 2d 12h  Actual LOS: [facility]

## 2022-02-04 NOTE — PROGRESS NOTES
Ortho / Neurosurgery NP Note    POD# 1 s/p C3-4, C4-5, C5-6 ANTERIOR CERVICAL DISCECTOMY AND  FUSION    Pt seen with no visitor present. Pt resting in bed. Alert, oriented  Watching TV. Upon my entry she was speaking clearly nd asked me to turn off TV  Continued to have issue with c-collar and spoke with Anita Renteria who was ok with use of soft collar instead. Pt yelling that \"this thing is too tight, take it off\". ...educated about why collar is needed. She states she \"cant breathe\". Collar is loose enough that I can get most of my fingers around all edges. Pt yelling initially about pain in right knee \"I can't move it\"    Reports numbness in rom hands has resolved and strength feels improved  Unsure of changes in BLE as of yet, has not been oob. And knee pain so great that she will not move toes or feet. Also c/o sore throat, hoarse voice unchanged from pre-op. Current smoker. Denies nausea     VSS Afebrile. On room air. While yelling and focused on pain or collar she hyperventilates. Coached on slowing breathing down. Able to briefly calm her. When distracted with questions about her knee her breathing returns to normal rate. She states she \"can't swallow\" but given water and she successfully swallowed sips.       Visit Vitals  BP (!) 143/74   Pulse (!) 103   Temp 99 °F (37.2 °C)   Resp 18   Ht 5' 2\" (1.575 m)   Wt 136.1 kg (300 lb)   SpO2 95%   BMI 54.87 kg/m²       Voiding status: + void  Output (mL)  Urine Voided: 750 ml (02/01/22 0807)  Last Bowel Movement Date: 02/02/22 (02/04/22 0851)  Unmeasurable Output  Urine Occurrence(s): 2 (02/03/22 0617)  Stool Occurrence(s): 1 (02/03/22 0617)      Labs    Lab Results   Component Value Date/Time    HGB 11.3 (L) 02/04/2022 04:26 AM      Lab Results   Component Value Date/Time    INR 1.0 02/02/2022 10:22 AM      Lab Results   Component Value Date/Time    Sodium 135 (L) 02/04/2022 04:26 AM    Potassium 4.0 02/04/2022 04:26 AM    Chloride 105 02/04/2022 04:26 AM    CO2 25 02/04/2022 04:26 AM    Glucose 94 02/04/2022 04:26 AM    BUN 12 02/04/2022 04:26 AM    Creatinine 0.80 02/04/2022 04:26 AM    Calcium 8.4 (L) 02/04/2022 04:26 AM     Recent Glucose Results:   Lab Results   Component Value Date/Time    GLU 94 02/04/2022 04:26 AM       Body mass index is 54.87 kg/m². : A BMI > 30 is classified as obesity and > 40 is classified as morbid obesity. Dressing c.d.i  HV Drain removed earlier this morning. Calves soft and supple; No pain with passive stretch  Sensation and motor intact - BUE  4+/5, deltoids/biceps 4+/5  Pt uncooperative to further assessment of LE  Knee with bruise over patella. SCDs for mechanical DVT proph while in bed     PLAN:  1) PT/OT - c-collar changed to soft collar  2) Knee pain - will add voltaren gel. Dr. Shon Olszewski would like to avoid restarting Meloxicam /NSAIDs to avoid complications with surgical fusion. 3) Pain control - scheduled tylenol, prn oxycodone. Chloraseptic for sore throat. PRN oxycodone available dose now RN notified. WIll add bowel regimen. 4) HV drain - removed today and ok with Dr. Shon Olszewski to resume Lovenox after drain removed today  5) Discharge planning - patient is a great candidate for IP Rehab to regain her recent loss of function due to severe spinal stenosis.      Planning for rehab referral    Rafael Zarco NP

## 2022-02-04 NOTE — WOUND CARE
Wound Care consult for the Sacrum and buttocks skin wounds that were present on admission. Patient has had Cervical fusion / decompression and is post op day 1. Pt. Ws in a cervical collar but she took it off last night due to anxiety / feeling claustrophobia. She is currently in a soft collar while in the bed. Assessment of the Sacrum:  Patient is completely incontinent of urine for several years now and wears containment products as needed at home. Patient has a darkly pigmented skin with a purple hue consistent with chronic skin changes due to incontinence. The wet skin causes PH changes on the skin and there are abrasions on both sides of the buttocks and a stage 2 pressure injury to the right buttocks that is fairly calloused. Recommended Treatment: Treat the skin with Desitin cream to protect against the incontinence. This will include the prompt attention to the incontinence. Use Mobility team rounds to do reassessments as needed for incontinence checks and skin assessments. Turn patient every two hours. Plan: Desitin has been ordered with the wound care.    Katerine aVzquez RN, BSN, [employer]

## 2022-02-05 ENCOUNTER — APPOINTMENT (OUTPATIENT)
Dept: GENERAL RADIOLOGY | Age: 42
DRG: 321 | End: 2022-02-05
Attending: NEUROLOGICAL SURGERY
Payer: MEDICAID

## 2022-02-05 PROCEDURE — 97530 THERAPEUTIC ACTIVITIES: CPT

## 2022-02-05 PROCEDURE — 74011000250 HC RX REV CODE- 250: Performed by: NEUROLOGICAL SURGERY

## 2022-02-05 PROCEDURE — 74011250637 HC RX REV CODE- 250/637: Performed by: NEUROLOGICAL SURGERY

## 2022-02-05 PROCEDURE — 74011000250 HC RX REV CODE- 250: Performed by: NURSE PRACTITIONER

## 2022-02-05 PROCEDURE — 74011250637 HC RX REV CODE- 250/637: Performed by: NURSE PRACTITIONER

## 2022-02-05 PROCEDURE — 74011250636 HC RX REV CODE- 250/636: Performed by: STUDENT IN AN ORGANIZED HEALTH CARE EDUCATION/TRAINING PROGRAM

## 2022-02-05 PROCEDURE — 72040 X-RAY EXAM NECK SPINE 2-3 VW: CPT

## 2022-02-05 PROCEDURE — 97110 THERAPEUTIC EXERCISES: CPT

## 2022-02-05 PROCEDURE — 65270000029 HC RM PRIVATE

## 2022-02-05 PROCEDURE — 74011250637 HC RX REV CODE- 250/637: Performed by: STUDENT IN AN ORGANIZED HEALTH CARE EDUCATION/TRAINING PROGRAM

## 2022-02-05 RX ORDER — LIDOCAINE HYDROCHLORIDE 20 MG/ML
15 SOLUTION OROPHARYNGEAL AS NEEDED
Status: DISCONTINUED | OUTPATIENT
Start: 2022-02-05 | End: 2022-02-07

## 2022-02-05 RX ADMIN — OXYCODONE 10 MG: 5 TABLET ORAL at 09:10

## 2022-02-05 RX ADMIN — SODIUM CHLORIDE, PRESERVATIVE FREE 10 ML: 5 INJECTION INTRAVENOUS at 15:00

## 2022-02-05 RX ADMIN — DICLOFENAC SODIUM 2 G: 10 GEL TOPICAL at 12:55

## 2022-02-05 RX ADMIN — PHENOL 1 SPRAY: 1.5 LIQUID ORAL at 03:01

## 2022-02-05 RX ADMIN — OXYCODONE 10 MG: 5 TABLET ORAL at 23:46

## 2022-02-05 RX ADMIN — SODIUM CHLORIDE, PRESERVATIVE FREE 10 ML: 5 INJECTION INTRAVENOUS at 22:18

## 2022-02-05 RX ADMIN — POLYETHYLENE GLYCOL 3350 17 G: 17 POWDER, FOR SOLUTION ORAL at 09:09

## 2022-02-05 RX ADMIN — Medication 1 AMPULE: at 09:00

## 2022-02-05 RX ADMIN — ACETAMINOPHEN 650 MG: 325 TABLET ORAL at 12:52

## 2022-02-05 RX ADMIN — ACETAMINOPHEN 650 MG: 325 TABLET ORAL at 23:39

## 2022-02-05 RX ADMIN — LIDOCAINE HYDROCHLORIDE 15 ML: 20 SOLUTION OROPHARYNGEAL at 02:52

## 2022-02-05 RX ADMIN — DICLOFENAC SODIUM 2 G: 10 GEL TOPICAL at 22:19

## 2022-02-05 RX ADMIN — Medication: at 23:40

## 2022-02-05 RX ADMIN — DICLOFENAC SODIUM 2 G: 10 GEL TOPICAL at 09:15

## 2022-02-05 RX ADMIN — ENOXAPARIN SODIUM 30 MG: 100 INJECTION SUBCUTANEOUS at 22:18

## 2022-02-05 RX ADMIN — ACETAMINOPHEN 650 MG: 325 TABLET ORAL at 09:10

## 2022-02-05 RX ADMIN — ACETAMINOPHEN 650 MG: 325 TABLET ORAL at 17:49

## 2022-02-05 RX ADMIN — GUAIFENESIN AND DEXTROMETHORPHAN 10 ML: 100; 10 SYRUP ORAL at 02:10

## 2022-02-05 RX ADMIN — Medication 1 AMPULE: at 22:19

## 2022-02-05 RX ADMIN — DICLOFENAC SODIUM 2 G: 10 GEL TOPICAL at 17:53

## 2022-02-05 RX ADMIN — ENOXAPARIN SODIUM 30 MG: 100 INJECTION SUBCUTANEOUS at 09:10

## 2022-02-05 RX ADMIN — LEVOTHYROXINE SODIUM 50 MCG: 0.05 TABLET ORAL at 09:10

## 2022-02-05 RX ADMIN — Medication: at 15:14

## 2022-02-05 RX ADMIN — SODIUM CHLORIDE, PRESERVATIVE FREE 10 ML: 5 INJECTION INTRAVENOUS at 07:48

## 2022-02-05 RX ADMIN — Medication: at 09:15

## 2022-02-05 NOTE — PROGRESS NOTES
Received notification from bedside RN about patient with regards to: throat discomfort, difficulty swallowing.  S/P ACDF 02/03  VS: /64, , RR 18, O2 sat 93% on RA    Intervention given: Chloraseptic lozenges PRN, lidocaine viscous solution PRN ordered

## 2022-02-05 NOTE — PROGRESS NOTES
Hospitalist Progress Note    NAME: Eric Waddell   :  1980   MRN:  [id number]       Assessment / Plan:  Severe Cervical Spinal stenosis with Radiculopathy POA  Myelopathy with spinal cord compression  S/p anterior cervical discectomy decompression and fusionC3-C4, C4-C5 and C5-C6-2/3. Right knee pain POA  Recurrent falls. Physical deconditioning  History of arthritis  H/o L ankle Fracture s/p ORIF at [facility] ortho  Hypothyroidism (newly diagnosed) POA  Morbid obesity POA  Tobacco abuse   MRI C Spine=  1. Disc protrusions C4-C5 and C5-C6, with severe canal stenosis and cord  flattening. 2. Probable abnormal cord signal C4-C5. Possible abnormal cord signal C5-C6. 3. Moderate canal stenosis C3-C4. 4. Neural foraminal stenoses: bilateral C3-C4, right and severe left C4-C5, left  C5-C6. Pt was under observation stay- changed to IP  after 2 [place] stay - failed outpatient management/treatment  Started on Mobic per recommendations of Orthopedic team- cont daily scheduled  Cont PO oxycodone prn severe pain only- avoid as able  Cont falls precautions  IP PT OT consult noted-- recommends IPR  IP  consulted- working on [employer] placement  IP Ortho consult appreciated-   - Generalized bony knee pain due to overload - NO KNEE intervention recommended  -  Wt loss through dietary changes   -  Considering adding once a day NSAID- Celebrex or mobic  - Rehab for upper and lower body strengthening  - AFO if R foot drop persists  - IP Neurology consulted for recommendations on R Foot drop- noted, MRI C & L spine ordered. Neurosurgery on boarddid the surgery yesterday has mentioned above. Postop day 1. PT and OT recommended inpatient rehab. Case management notified. Patient medically stable for discharge to the inpatient rehab. HbA1c- 5.6  TSH = 11.3, started on Synthroid daily, Outpatient TSH as outpatient in 3 weeks for further dose titration  Patient declined nicotine patch.     Dispositionpatient medically stable for discharge home pending placement.     Code Status: Full code  Surrogate Decision Maker: Mother 79 yrs old at home who cant help physically     DVT Prophylaxis: Lovenox  GI Prophylaxis: not indicated     Baseline: Ambulatory    Estimated discharge date: February 5  Barriers: Dispositioninpatient rehab    Recommended Disposition: [facility]/Rehab     Subjective:     Chief Complaint / Reason for Physician Visit:  Patient seen and examined today, complaining of the soreness in the throat. No other complaints. Review of Systems:  Symptom Y/N Comments  Symptom Y/N Comments   Fever/Chills n   Chest Pain n    Poor Appetite n   Edema n    Cough n   Abdominal Pain n    Sputum n   Joint Pain y R Knee   SOB/BECKETT n   Pruritis/Rash n    Nausea/vomit n   Tolerating PT/OT y    Diarrhea    Tolerating Diet y    Constipation    Other       Could NOT obtain due to:      Objective:     VITALS:   Last 24hrs VS reviewed since prior progress note. Most recent are:  Patient Vitals for the past 24 hrs:   Temp Pulse Resp BP SpO2   02/05/22 0755 97.4 °F (36.3 °C) (!) 102 18 127/70 92 %   02/05/22 0357 98.9 °F (37.2 °C) (!) 101 18 123/72 95 %   02/05/22 0318 99.8 °F (37.7 °C) (!) 102  139/65 94 %   02/04/22 2302 97.6 °F (36.4 °C) (!) 112 18 129/64 93 %   02/04/22 1914 98.3 °F (36.8 °C) 98 20 130/71 93 %   02/04/22 1544 98.5 °F (36.9 °C) 95 18 131/73 95 %   02/04/22 1447  (!) 103  (!) 144/76    02/04/22 1440    117/69    02/04/22 1213 98.4 °F (36.9 °C) (!) 102 18 137/70 95 %     No intake or output data in the 24 hours ending 02/05/22 1140     I had a face to face encounter and independently examined this patient on 2/5/2022, as outlined below:  PHYSICAL EXAM:  General: WD, WN. Alert, cooperative, no acute distress, Morbid Obese +    EENT:  EOMI. Anicteric sclerae. MMM  Resp:  CTA bilaterally, no wheezing or rales. No accessory muscle use  CV:  Regular  rhythm,  No edema  GI:  Soft, Non distended, Non tender.   +Bowel sounds  Neurologic:  Alert and oriented X 3, normal speech,   Psych:   Good insight. Not anxious nor agitated  Skin:  No rashes. No jaundice    Reviewed most current lab test results and cultures  YES  Reviewed most current radiology test results   YES  Review and summation of old records today    NO  Reviewed patient's current orders and MAR    YES  PMH/SH reviewed - no change compared to H&P  ________________________________________________________________________  Care Plan discussed with:    Comments   Patient x    Family      RN x    Care Manager     Consultant                        Multidiciplinary team rounds were held today with , nursing, pharmacist and clinical coordinator. Patient's plan of care was discussed; medications were reviewed and discharge planning was addressed. ________________________________________________________________________  Total NON critical care TIME:  30   Minutes    Total CRITICAL CARE TIME Spent:   Minutes non procedure based      Comments   >50% of visit spent in counseling and coordination of care     ________________________________________________________________________  Ariana Sloan MD     Procedures: see electronic medical records for all procedures/Xrays and details which were not copied into this note but were reviewed prior to creation of Plan. LABS:  I reviewed today's most current labs and imaging studies.   Pertinent labs include:  Recent Labs     02/04/22 0426   WBC 10.0   HGB 11.3*   HCT 33.8*        Recent Labs     02/04/22 0426   *   K 4.0      CO2 25   GLU 94   BUN 12   CREA 0.80   CA 8.4*       Signed: Ariana Sloan MD

## 2022-02-05 NOTE — PROGRESS NOTES
Physical Therapy    Pt currently MALAIKA for testing. Will follow in pm for session.     Kasey Courser, PT

## 2022-02-05 NOTE — PROGRESS NOTES
Problem: Pressure Injury - Risk of  Goal: *Prevention of pressure injury  Description: Document Tru Scale and appropriate interventions in the flowsheet. Outcome: Progressing Towards Goal  Note: Pressure Injury Interventions:  Sensory Interventions: Assess changes in LOC    Moisture Interventions: Absorbent underpads    Activity Interventions: Increase time out of bed    Mobility Interventions: Assess need for specialty bed    Nutrition Interventions: Document food/fluid/supplement intake    Friction and Shear Interventions: Lift sheet                Problem: Patient Education: Go to Patient Education Activity  Goal: Patient/Family Education  Outcome: Progressing Towards Goal     Problem: Falls - Risk of  Goal: *Absence of Falls  Description: Document Mona Schwartz Fall Risk and appropriate interventions in the flowsheet. Outcome: Progressing Towards Goal  Note: Fall Risk Interventions:  Mobility Interventions: Communicate number of staff needed for ambulation/transfer    Mentation Interventions: Adequate sleep, hydration, pain control,Gait belt with transfers/ambulation,Update white board    Medication Interventions: Patient to call before getting OOB    Elimination Interventions: Call light in reach    History of Falls Interventions:  Investigate reason for fall         Problem: Patient Education: Go to Patient Education Activity  Goal: Patient/Family Education  Outcome: Progressing Towards Goal     Problem: Patient Education: Go to Patient Education Activity  Goal: Patient/Family Education  Outcome: Progressing Towards Goal     Problem: Pain  Goal: *Control of Pain  Outcome: Progressing Towards Goal     Problem: Patient Education: Go to Patient Education Activity  Goal: Patient/Family Education  Outcome: Progressing Towards Goal     Problem: Patient Education: Go to Patient Education Activity  Goal: Patient/Family Education  Outcome: Progressing Towards Goal

## 2022-02-05 NOTE — PROGRESS NOTES
Covering for Dr Zavala Artist  No problems with hardware placement at operative site or alignment of the C spine  Pt awaiting placement to rehab  Not unusual for patients to have some difficulty swallowing especially after undergoing multilevel discectomy as in this case

## 2022-02-05 NOTE — PROGRESS NOTES
End of Shift Note    Bedside shift change report given to Donalee Gilford, RN (oncoming nurse) by Everett Goddard RN (offgoing nurse). Report included the following information SBAR, Kardex, Intake/Output and MAR    Shift worked:  7P-7A     Shift summary and any significant changes:    Pt complaining difficulty in swallowing held all night med and 6 am med. she refused to put ice pack and soft collar and refused purewrick too. Concerns for physician to address:       Zone phone for oncoming shift:   [phone]       Activity:  Activity Level: Up with Assistance  Number times ambulated in hallways past shift: 0  Number of times OOB to chair past shift: 0    Cardiac:   Cardiac Monitoring: No      Cardiac Rhythm: Sinus Rhythm    Access:   Current line(s): PIV     Genitourinary:   Urinary status: incontinent    Respiratory:   O2 Device: Nasal cannula  Chronic home O2 use?: NO  Incentive spirometer at bedside: YES  Actual Volume (ml): 750 ml  GI:  Last Bowel Movement Date: 02/02/22  Current diet:  ADULT DIET Full Liquid  DIET ONE TIME MESSAGE  ADULT ORAL NUTRITION SUPPLEMENT Breakfast, Lunch, Dinner; Low Calorie/High Protein  DIET ONE TIME MESSAGE  Passing flatus: YES  Tolerating current diet: YES       Pain Management:   Patient states pain is manageable on current regimen: YES    Skin:  Tru Score: 16  Interventions: increase time out of bed and PT/OT consult    Patient Safety:  Fall Score:  Total Score: 4  Interventions: assistive device (walker, cane, etc), gripper socks and pt to call before getting OOB  High Fall Risk: Yes    Length of Stay:  Expected LOS: 2d 12h  Actual LOS: 5      Jamal Steward RN

## 2022-02-05 NOTE — PROGRESS NOTES
Informed on call Dr Fernando Jones about pt having difficulty in swalloing and pain on her throat. Advice RN to place ice pack on neck ,start iv fluid and to order X-ray for morning.

## 2022-02-05 NOTE — PROGRESS NOTES
Problem: Mobility Impaired (Adult and Pediatric)  Goal: *Acute Goals and Plan of Care (Insert Text)  Description: FUNCTIONAL STATUS PRIOR TO ADMISSION: recurrent falls due to R knee buckling; has difficulty getting off sofa where she sleeps; reports home is too cluttered to use a RW. HOME SUPPORT PRIOR TO ADMISSION: The patient lived with mother in 1 story home with 4 steps to enter and unstable rails. Mother assists patient with ADLs and meals. Physical Therapy Goals  Initiated 1/29/2022. Re-Eval 2/4/2022 due to ACDF, goals remain appropriate. 1.  Patient will move from supine to sit and sit to supine , scoot up and down, and roll side to side in bed with supervision/set-up within 7 day(s). 2.  Patient will transfer from bed to chair and chair to bed with minimal assistance/contact guard assist using the least restrictive device within 7 day(s). 3.  Patient will perform sit to stand with minimal assistance/contact guard assist within 7 day(s). 4.  Patient will ambulate with minimal assistance/contact guard assist for 35 feet with the least restrictive device within 7 day(s). 5.  Patient will ascend/descend 4 stairs with 1 handrail(s) with minimal assistance/contact guard assist within 7 day(s). Outcome: Progressing Towards Goal   PHYSICAL THERAPY TREATMENT  Patient: Karo Horn (49 y.o. female)  Date: 2/5/2022  Diagnosis: Falls [W19. XXXA]  Inability to ambulate due to multiple joints [R26.2]  Cervical radiculopathy [M54.12]  Right knee pain [M25.561] <principal problem not specified>  Procedure(s) (LRB):  C3-4, C4-5, C5-6 ANTERIOR CERVICAL DISCECTOMY AND  FUSION  (N/A) 2 Days Post-Op  Precautions: Fall  Chart, physical therapy assessment, plan of care and goals were reviewed. ASSESSMENT  Patient continues with skilled PT services and is progressing towards goals, doing better with all mobility. Pt received in bed, no collar in place.  Hard collar donned at beginning of session for activity. Pt able to prop self up in bed to assist with donning. Pt able to move to edge of bed with mod A of 1 using rail. She shows good sitting balance. She was able to come to stand x2 with min A of 2 to standing at walker. She was able to stand and maintain with CGA of 2. She does shift weight toward stronger leg on L but did not buckle this session like last. She was able to side step with the walker with min A of 1 and CGA of 1 having more difficulty adducting RLE but able to accept weight well for step with L. She returned to sitting with control and min A of 2. She was sitting well at edge of bed but leaned backward and began to slide on edge of bed and then needed A of 3 to return safely to supine. Pt able to tolerate LE exercises in bed: 10x AAROM hip/knee flexion, hip abduction, isometric quad sets and glut sets, active ankle pumps although R active dorsiflexon limited and PROM complete for improving range. Sister in for session and is very eager to assist. Educated pt and sister on exercises they could complete with sister's assist when visiting and ones pt could work on in bed on her own. Current Level of Function Impacting Discharge (mobility/balance): see above details    Other factors to consider for discharge: will need inpatient rehab prior to return home given cord compression, surgery and current deficits; need for assist of 2 for mobility; not yet safe for amb away from bed         PLAN :  Patient continues to benefit from skilled intervention to address the above impairments. Continue treatment per established plan of care. to address goals.     Recommendation for discharge: (in order for the patient to meet his/her long term goals)  Therapy 3 hours per day 5-7 days per week    This discharge recommendation:  Has been made in collaboration with the attending provider and/or case management    IF patient discharges home will need the following DME: to be determined (TBD)       SUBJECTIVE: Patient stated I don't like it (brace).     OBJECTIVE DATA SUMMARY:   Critical Behavior:  Neurologic State: Alert,Appropriate for age  Orientation Level: Oriented X4  Cognition: Follows commands  Safety/Judgement: Awareness of environment,Fall prevention,Insight into deficits,Decreased insight into deficits (learning about deficits; anxious re deficits)  Functional Mobility Training:  Bed Mobility:  Rolling: Minimum assistance (with rail)  Supine to Sit: Moderate assistance; Other (comment); Assist x1 (with rail)  Sit to Supine: Maximum assistance; Other (comment) (x3 due to pt starting to slide off bed)  Scooting: Minimum assistance        Transfers:  Sit to Stand: Minimum assistance;Assist x2  Stand to Sit: Minimum assistance;Assist x2                             Balance:  Sitting: Intact  Standing: Impaired  Standing - Static: Fair;Constant support; Other (comment) (RW)  Standing - Dynamic : Fair;Constant support; Other (comment) (RW)  Ambulation/Gait Training:              Gait Description (WDL):  (side step at edge of bed with min to CGA of 2)                      Therapeutic Exercises:   See above narrative      Activity Tolerance:   Fair    After treatment patient left in no apparent distress:   Supine in bed, Call bell within reach, Caregiver / family present, and Side rails x 3    COMMUNICATION/COLLABORATION:   The patients plan of care was discussed with: Registered nurse.      Sanna Mitchell, PT   Time Calculation: 28 mins

## 2022-02-06 LAB
BASOPHILS # BLD: 0 K/UL (ref 0–0.1)
BASOPHILS NFR BLD: 0 % (ref 0–1)
DIFFERENTIAL METHOD BLD: ABNORMAL
EOSINOPHIL # BLD: 0.2 K/UL (ref 0–0.4)
EOSINOPHIL NFR BLD: 2 % (ref 0–7)
ERYTHROCYTE [DISTWIDTH] IN BLOOD BY AUTOMATED COUNT: 14.5 % (ref 11.5–14.5)
HCT VFR BLD AUTO: 32.3 % (ref 35–47)
HGB BLD-MCNC: 10.6 G/DL (ref 11.5–16)
IMM GRANULOCYTES # BLD AUTO: 0.1 K/UL (ref 0–0.04)
IMM GRANULOCYTES NFR BLD AUTO: 1 % (ref 0–0.5)
LYMPHOCYTES # BLD: 2.1 K/UL (ref 0.8–3.5)
LYMPHOCYTES NFR BLD: 24 % (ref 12–49)
MCH RBC QN AUTO: 36.8 PG (ref 26–34)
MCHC RBC AUTO-ENTMCNC: 32.8 G/DL (ref 30–36.5)
MCV RBC AUTO: 112.2 FL (ref 80–99)
MONOCYTES # BLD: 0.6 K/UL (ref 0–1)
MONOCYTES NFR BLD: 7 % (ref 5–13)
NEUTS SEG # BLD: 5.7 K/UL (ref 1.8–8)
NEUTS SEG NFR BLD: 66 % (ref 32–75)
NRBC # BLD: 0 K/UL (ref 0–0.01)
NRBC BLD-RTO: 0 PER 100 WBC
PLATELET # BLD AUTO: 193 K/UL (ref 150–400)
PMV BLD AUTO: 10.2 FL (ref 8.9–12.9)
RBC # BLD AUTO: 2.88 M/UL (ref 3.8–5.2)
RBC MORPH BLD: ABNORMAL
WBC # BLD AUTO: 8.7 K/UL (ref 3.6–11)

## 2022-02-06 PROCEDURE — 74011250637 HC RX REV CODE- 250/637: Performed by: NEUROLOGICAL SURGERY

## 2022-02-06 PROCEDURE — 85025 COMPLETE CBC W/AUTO DIFF WBC: CPT

## 2022-02-06 PROCEDURE — 97110 THERAPEUTIC EXERCISES: CPT

## 2022-02-06 PROCEDURE — 36415 COLL VENOUS BLD VENIPUNCTURE: CPT

## 2022-02-06 PROCEDURE — 74011250637 HC RX REV CODE- 250/637: Performed by: NURSE PRACTITIONER

## 2022-02-06 PROCEDURE — 97530 THERAPEUTIC ACTIVITIES: CPT

## 2022-02-06 PROCEDURE — 65270000029 HC RM PRIVATE

## 2022-02-06 PROCEDURE — 74011250636 HC RX REV CODE- 250/636: Performed by: STUDENT IN AN ORGANIZED HEALTH CARE EDUCATION/TRAINING PROGRAM

## 2022-02-06 PROCEDURE — 74011000250 HC RX REV CODE- 250: Performed by: NEUROLOGICAL SURGERY

## 2022-02-06 PROCEDURE — 77010033678 HC OXYGEN DAILY

## 2022-02-06 RX ADMIN — SODIUM CHLORIDE, PRESERVATIVE FREE 10 ML: 5 INJECTION INTRAVENOUS at 05:40

## 2022-02-06 RX ADMIN — Medication 1 AMPULE: at 22:22

## 2022-02-06 RX ADMIN — ACETAMINOPHEN 650 MG: 325 TABLET ORAL at 05:40

## 2022-02-06 RX ADMIN — Medication 1 AMPULE: at 09:23

## 2022-02-06 RX ADMIN — DICLOFENAC SODIUM 2 G: 10 GEL TOPICAL at 13:20

## 2022-02-06 RX ADMIN — ACETAMINOPHEN 650 MG: 325 TABLET ORAL at 12:09

## 2022-02-06 RX ADMIN — LEVOTHYROXINE SODIUM 50 MCG: 0.05 TABLET ORAL at 05:40

## 2022-02-06 RX ADMIN — ACETAMINOPHEN 650 MG: 325 TABLET ORAL at 17:33

## 2022-02-06 RX ADMIN — Medication: at 17:43

## 2022-02-06 RX ADMIN — DOCUSATE SODIUM 50 MG AND SENNOSIDES 8.6 MG 2 TABLET: 8.6; 5 TABLET, FILM COATED ORAL at 22:23

## 2022-02-06 RX ADMIN — SODIUM CHLORIDE, PRESERVATIVE FREE 10 ML: 5 INJECTION INTRAVENOUS at 22:23

## 2022-02-06 RX ADMIN — DICLOFENAC SODIUM 2 G: 10 GEL TOPICAL at 22:00

## 2022-02-06 RX ADMIN — DICLOFENAC SODIUM 2 G: 10 GEL TOPICAL at 09:00

## 2022-02-06 RX ADMIN — Medication: at 22:00

## 2022-02-06 RX ADMIN — Medication: at 10:11

## 2022-02-06 RX ADMIN — ENOXAPARIN SODIUM 30 MG: 100 INJECTION SUBCUTANEOUS at 09:23

## 2022-02-06 RX ADMIN — ENOXAPARIN SODIUM 30 MG: 100 INJECTION SUBCUTANEOUS at 22:23

## 2022-02-06 RX ADMIN — DICLOFENAC SODIUM 2 G: 10 GEL TOPICAL at 18:00

## 2022-02-06 NOTE — PROGRESS NOTES
Problem: Pressure Injury - Risk of  Goal: *Prevention of pressure injury  Description: Document Tru Scale and appropriate interventions in the flowsheet. Outcome: Progressing Towards Goal  Note: Pressure Injury Interventions:  Sensory Interventions: Assess changes in LOC    Moisture Interventions: Absorbent underpads    Activity Interventions: Increase time out of bed,PT/OT evaluation    Mobility Interventions: HOB 30 degrees or less,PT/OT evaluation,Float heels    Nutrition Interventions: Document food/fluid/supplement intake    Friction and Shear Interventions: Apply protective barrier, creams and emollients,HOB 30 degrees or less                Problem: Patient Education: Go to Patient Education Activity  Goal: Patient/Family Education  Outcome: Progressing Towards Goal     Problem: Falls - Risk of  Goal: *Absence of Falls  Description: Document Leslee Fall Risk and appropriate interventions in the flowsheet.   Outcome: Progressing Towards Goal  Note: Fall Risk Interventions:  Mobility Interventions: Communicate number of staff needed for ambulation/transfer    Mentation Interventions: Adequate sleep, hydration, pain control    Medication Interventions: Evaluate medications/consider consulting pharmacy    Elimination Interventions: Call light in reach    History of Falls Interventions: Evaluate medications/consider consulting pharmacy,Investigate reason for fall,Vital signs minimum Q4HRs X 24 hrs (comment for end date)         Problem: Patient Education: Go to Patient Education Activity  Goal: Patient/Family Education  Outcome: Progressing Towards Goal     Problem: Patient Education: Go to Patient Education Activity  Goal: Patient/Family Education  Outcome: Progressing Towards Goal     Problem: Patient Education: Go to Patient Education Activity  Goal: Patient/Family Education  Outcome: Progressing Towards Goal     Problem: Pain  Goal: *Control of Pain  Outcome: Progressing Towards Goal     Problem: Patient Education: Go to Patient Education Activity  Goal: Patient/Family Education  Outcome: Progressing Towards Goal     Problem: Patient Education: Go to Patient Education Activity  Goal: Patient/Family Education  Outcome: Progressing Towards Goal

## 2022-02-06 NOTE — PROGRESS NOTES
Hospitalist Progress Note    NAME: Eric Waddell   :  1980   MRN:  [id number]       Assessment / Plan:  Severe Cervical Spinal stenosis with Radiculopathy POA  Myelopathy with spinal cord compression  S/p anterior cervical discectomy decompression and fusionC3-C4, C4-C5 and C5-C6-2/3. Right knee pain POA  Recurrent falls. Physical deconditioning  History of arthritis  H/o L ankle Fracture s/p ORIF at [facility] ortho  Hypothyroidism (newly diagnosed) POA  Morbid obesity POA  Tobacco abuse     MRI C Spine=  1. Disc protrusions C4-C5 and C5-C6, with severe canal stenosis and cord  flattening. 2. Probable abnormal cord signal C4-C5. Possible abnormal cord signal C5-C6. 3. Moderate canal stenosis C3-C4. 4. Neural foraminal stenoses: bilateral C3-C4, right and severe left C4-C5, left  C5-C6.     Pt was under observation stay- changed to IP  after 2 MN stay - failed outpatient management/treatment  Started on Mobic per recommendations of Orthopedic team- cont daily scheduled  Cont PO oxycodone prn severe pain only- avoid as able  Cont falls precautions  IP PT OT consult noted-- recommends IPR  IP  consulted- working on [employer] placement  IP Ortho consult appreciated-   - Generalized bony knee pain due to overload - NO KNEE intervention recommended  -  Wt loss through dietary changes   -  Considering adding once a day NSAID- Celebrex or mobic  - Rehab for upper and lower body strengthening  - AFO if R foot drop persists  - IP Neurology eval for Right foot drop. Neurosurgery on boards/p S/p anterior cervical discectomy decompression and fusionC3-C4, C4-C5 and C5-C6-2/3. Dysphagia:  Post operative  Neurosurgery mentions that this is not unusal for patients to have some difficulty swallowing especially after undergoing multilevel discectomy  Mentions its getting better. Xray C spine:  Recent anterior cervical fusion with prevertebral soft tissue  swelling as above.     Hypothyroidism:    TSH = 11.3, started on Synthroid daily, Outpatient TSH as outpatient in 3 weeks for further dose titration     Active smoker:  Counselled on cessation  Patient declined nicotine patch.     Dispositionpatient medically stable for discharge home pending placement.     Code Status: Full code  Surrogate Decision Maker: Mother 79 yrs old at home who cant help physically     DVT Prophylaxis: Lovenox  GI Prophylaxis: not indicated     Baseline: Ambulatory     Estimated discharge date: February 5  Barriers: Dispositioninpatient rehab     Recommended Disposition: [facility]/Rehab     Subjective:     Chief Complaint / Reason for Physician Visit  Follow up for Back pain  She mentions her difficulty swallowing is very gradually getting better. Review of Systems:  Symptom Y/N Comments  Symptom Y/N Comments   Fever/Chills n   Chest Pain n    Poor Appetite n   Edema n    Cough    Abdominal Pain     Sputum    Joint Pain     SOB/BECKETT    Pruritis/Rash     Nausea/vomit    Tolerating PT/OT     Diarrhea    Tolerating Diet     Constipation    Other       Could NOT obtain due to:      Objective:     VITALS:   Last 24hrs VS reviewed since prior progress note. Most recent are:  Patient Vitals for the past 24 hrs:   Temp Pulse Resp BP SpO2   02/06/22 0837 98 °F (36.7 °C) (!) 106 16 138/78 93 %   02/06/22 0329 97.8 °F (36.6 °C) (!) 101 18 111/73 91 %   02/05/22 2346 98.2 °F (36.8 °C) 94 16 133/70 93 %   02/05/22 2038 98.1 °F (36.7 °C) 95 18 133/68 93 %   02/05/22 1500 98.4 °F (36.9 °C) 85 18 122/60 93 %   02/05/22 1225 97.8 °F (36.6 °C) 99 19 126/75 93 %     No intake or output data in the 24 hours ending 02/06/22 1030     I had a face to face encounter and independently examined this patient on 2/6/2022, as outlined below:  PHYSICAL EXAM:  General: WD, WN. Alert, cooperative, no acute distress    EENT:  EOMI. Anicteric sclerae. MMM, Incision site at lower neck covered with dressing, is dry  Resp:  CTA bilaterally, no wheezing or rales.   No accessory muscle use  CV:  Regular  rhythm,  No edema  GI:  Soft, Non distended, Non tender. +Bowel sounds  Neurologic:  Alert and oriented X 3, normal speech,   Psych:   Good insight. Not anxious nor agitated  Skin:  No rashes. No jaundice    Reviewed most current lab test results and cultures  YES  Reviewed most current radiology test results   YES  Review and summation of old records today    NO  Reviewed patient's current orders and MAR    YES  PMH/SH reviewed - no change compared to H&P  ________________________________________________________________________  Care Plan discussed with:    Comments   Patient y    Family      RN y    Care Manager     Consultant                        Multidiciplinary team rounds were held today with , nursing, pharmacist and clinical coordinator. Patient's plan of care was discussed; medications were reviewed and discharge planning was addressed. ________________________________________________________________________  Total NON critical care TIME: 39  Minutes    Total CRITICAL CARE TIME Spent:   Minutes non procedure based      Comments   >50% of visit spent in counseling and coordination of care     ________________________________________________________________________  Mellissa Alarcon MD     Procedures: see electronic medical records for all procedures/Xrays and details which were not copied into this note but were reviewed prior to creation of Plan. LABS:  I reviewed today's most current labs and imaging studies.   Pertinent labs include:  Recent Labs     02/06/22 0410 02/04/22 0426   WBC 8.7 10.0   HGB 10.6* 11.3*   HCT 32.3* 33.8*    201     Recent Labs     02/04/22  0426   *   K 4.0      CO2 25   GLU 94   BUN 12   CREA 0.80   CA 8.4*       Signed: Melilssa Alarcon MD

## 2022-02-06 NOTE — PROGRESS NOTES
Bedside and Verbal shift change report given to Jamal (oncoming nurse) by Becky Lemus (offgoing nurse). Report included the following information SBAR and Kardex.

## 2022-02-06 NOTE — PROGRESS NOTES
Awake alert  Voice strong  No hoarseness, She feels strength in both arms is improved after surgery  Still weak in the legs  Can lift feet off bed against gravity   Will need extensive rehab and understands it may take a long time to regain strength in legs or it may remain the same as it is now  Tolerating diet well  Swallowing better  Incision clean and dry , not swollen  Good progress  Awaiting rehab transfer

## 2022-02-06 NOTE — PROGRESS NOTES
Problem: Mobility Impaired (Adult and Pediatric)  Goal: *Acute Goals and Plan of Care (Insert Text)  Description: FUNCTIONAL STATUS PRIOR TO ADMISSION: recurrent falls due to R knee buckling; has difficulty getting off sofa where she sleeps; reports home is too cluttered to use a RW. HOME SUPPORT PRIOR TO ADMISSION: The patient lived with mother in 1 story home with 4 steps to enter and unstable rails. Mother assists patient with ADLs and meals. Physical Therapy Goals  Initiated 1/29/2022. Re-Eval 2/4/2022 due to ACDF, goals remain appropriate. 1.  Patient will move from supine to sit and sit to supine , scoot up and down, and roll side to side in bed with supervision/set-up within 7 day(s). 2.  Patient will transfer from bed to chair and chair to bed with minimal assistance/contact guard assist using the least restrictive device within 7 day(s). 3.  Patient will perform sit to stand with minimal assistance/contact guard assist within 7 day(s). 4.  Patient will ambulate with minimal assistance/contact guard assist for 35 feet with the least restrictive device within 7 day(s). 5.  Patient will ascend/descend 4 stairs with 1 handrail(s) with minimal assistance/contact guard assist within 7 day(s). Outcome: Progressing Towards Goal     PHYSICAL THERAPY TREATMENT  Patient: Robbi Jordan (1 y.o. female)  Date: 2/6/2022  Diagnosis: Falls [W19. XXXA]  Inability to ambulate due to multiple joints [R26.2]  Cervical radiculopathy [M54.12]  Right knee pain [M25.561] <principal problem not specified>  Procedure(s) (LRB):  C3-4, C4-5, C5-6 ANTERIOR CERVICAL DISCECTOMY AND  FUSION  (N/A) 3 Days Post-Op  Precautions: Fall No bending, no lifting greater than 5 lbs, no twisting, log-roll technique, repositioning every 20-30 min except when sleeping, brace when OOB (if ordered)  Chart, physical therapy assessment, plan of care and goals were reviewed.     ASSESSMENT  Patient continues with skilled PT services and is slowly progressing towards goals. Pt received supine in bed without collar on, assisted with donning the collar with difficulties due to increased of pain and reported swelling. Pt continues to be limited by reported R knee pain, cervical pain with activity. Pt presents with no R ankle AROM, grossly decreased LE strength, low activity tolerance due to pain this session. Pt able to tolerate several LE ex with VC for from needing AAROM with R ankle and manual support for heel slides and hook lying glute sets, able to tolerate 5- 8 reps ea ex. Pt aslo presents with little control with knee press with 5 s hold with decreased bilateral contraction after initial hold due to quad weakness. Pt able to tolerate slightly lowers EOB to continues with rolling with max A. Pt not able to tolerate supine due to increase of pain. Noted pt able to roll to R side with slightly less assistance than L with VC for LE assistance and LLE being stronger than RLE. Pt continued with HOB scooting with features of the bed and UE/LE assistance with max A . Pt continued with anterior trunk leans to increase trunk and UE strength, with reports of slight increase of pain. Pt completed session with HOB elevated and postioned to comfort with call bell within reach and all needs met at the time. Rn notified of session. Current Level of Function Impacting Discharge (mobility/balance): max A rolling this session    Other factors to consider for discharge: fall risk, anxiety. PLAN :  Patient continues to benefit from skilled intervention to address the above impairments. Continue treatment per established plan of care. to address goals.     Recommendation for discharge: (in order for the patient to meet his/her long term goals)  Therapy 3 hours per day 5-7 days per week    This discharge recommendation:  Has not yet been discussed the attending provider and/or case management    IF patient discharges home will need the following DME: to be determined (TBD)       SUBJECTIVE:   Patient stated I am a smoker, do you think I can go outside when I go to rehab? Floyd Arana    OBJECTIVE DATA SUMMARY:   Critical Behavior:  Neurologic State: Alert  Orientation Level: Oriented X4  Cognition: Follows commands  Safety/Judgement: Awareness of environment,Fall prevention,Insight into deficits,Decreased insight into deficits (learning about deficits; anxious re deficits)        Functional Mobility Training:    Bed Mobility:  Log Rolling: Maximum assistance  Scooting: Maximum assistance (Supine to [facility])    Therapeutic Exercises:   Supine LE ex- AP, knee presses, heel slides, Hip ADD (with pillow), glute sets x8ea    Pain Rating:  Reporting cervical pain with actvity    Activity Tolerance:   Fair and Poor    After treatment patient left in no apparent distress:   Supine in bed and Call bell within reach    COMMUNICATION/COLLABORATION:   The patients plan of care was discussed with: Registered nurse.      Mulugeta Daniels PTA   Time Calculation: 42 mins

## 2022-02-06 NOTE — PROGRESS NOTES
End of Shift Note    Bedside shift change report given to Veronica Vaz RN (oncoming nurse) by Shreyas Power RN (offgoing nurse). Report included the following information SBAR, Kardex, Intake/Output, MAR and Recent Results    Shift worked:  7p-7a     Shift summary and any significant changes:     pt pan controlled by oxycodone. pt is incontinent with bowel and bladder. take pills crust with apple sauce. Concerns for physician to address:       Zone phone for oncoming shift:   [phone]       Activity:  Activity Level: Up with Assistance  Number times ambulated in hallways past shift: 0  Number of times OOB to chair past shift: 0    Cardiac:   Cardiac Monitoring: No      Cardiac Rhythm: Sinus Rhythm    Access:   Current line(s): PIV     Genitourinary:   Urinary status: voiding and incontinent    Respiratory:   O2 Device: Nasal cannula  Chronic home O2 use?: NO  Incentive spirometer at bedside: YES  Actual Volume (ml): 750 ml  GI:  Last Bowel Movement Date: 02/05/22  Current diet:  ADULT DIET Full Liquid  DIET ONE TIME MESSAGE  ADULT ORAL NUTRITION SUPPLEMENT Breakfast, Lunch, Dinner; Low Calorie/High Protein  DIET ONE TIME MESSAGE  DIET ONE TIME MESSAGE  Passing flatus: YES  Tolerating current diet: YES       Pain Management:   Patient states pain is manageable on current regimen: YES    Skin:  Tru Score: 11  Interventions: float heels, increase time out of bed and foam dressing    Patient Safety:  Fall Score:  Total Score: 3  Interventions: assistive device (walker, cane, etc), gripper socks and pt to call before getting OOB  High Fall Risk: Yes    Length of Stay:  Expected LOS: 2d 12h  Actual LOS: 6      Jamal Steward RN

## 2022-02-07 PROCEDURE — 97530 THERAPEUTIC ACTIVITIES: CPT | Performed by: OCCUPATIONAL THERAPIST

## 2022-02-07 PROCEDURE — 74011250637 HC RX REV CODE- 250/637: Performed by: NURSE PRACTITIONER

## 2022-02-07 PROCEDURE — 97535 SELF CARE MNGMENT TRAINING: CPT | Performed by: OCCUPATIONAL THERAPIST

## 2022-02-07 PROCEDURE — 74011250637 HC RX REV CODE- 250/637: Performed by: NEUROLOGICAL SURGERY

## 2022-02-07 PROCEDURE — 65270000029 HC RM PRIVATE

## 2022-02-07 PROCEDURE — 74011250636 HC RX REV CODE- 250/636: Performed by: STUDENT IN AN ORGANIZED HEALTH CARE EDUCATION/TRAINING PROGRAM

## 2022-02-07 PROCEDURE — 97530 THERAPEUTIC ACTIVITIES: CPT

## 2022-02-07 PROCEDURE — 74011000250 HC RX REV CODE- 250: Performed by: NEUROLOGICAL SURGERY

## 2022-02-07 RX ORDER — LORAZEPAM 1 MG/1
1 TABLET ORAL
Status: DISCONTINUED | OUTPATIENT
Start: 2022-02-07 | End: 2022-02-13 | Stop reason: HOSPADM

## 2022-02-07 RX ORDER — POLYETHYLENE GLYCOL 3350 17 G/17G
17 POWDER, FOR SOLUTION ORAL
Status: DISCONTINUED | OUTPATIENT
Start: 2022-02-07 | End: 2022-02-13 | Stop reason: HOSPADM

## 2022-02-07 RX ORDER — LIDOCAINE HYDROCHLORIDE 20 MG/ML
15 SOLUTION OROPHARYNGEAL
Status: DISCONTINUED | OUTPATIENT
Start: 2022-02-07 | End: 2022-02-13 | Stop reason: HOSPADM

## 2022-02-07 RX ORDER — PEPPERMINT OIL
SPIRIT ORAL ONCE
Status: DISPENSED | OUTPATIENT
Start: 2022-02-07 | End: 2022-02-07

## 2022-02-07 RX ORDER — AMOXICILLIN 250 MG
2 CAPSULE ORAL
Status: DISCONTINUED | OUTPATIENT
Start: 2022-02-07 | End: 2022-02-13 | Stop reason: HOSPADM

## 2022-02-07 RX ORDER — GABAPENTIN 300 MG/1
300 CAPSULE ORAL 3 TIMES DAILY
Status: DISCONTINUED | OUTPATIENT
Start: 2022-02-07 | End: 2022-02-13 | Stop reason: HOSPADM

## 2022-02-07 RX ADMIN — ENOXAPARIN SODIUM 30 MG: 100 INJECTION SUBCUTANEOUS at 08:53

## 2022-02-07 RX ADMIN — SODIUM CHLORIDE, PRESERVATIVE FREE 10 ML: 5 INJECTION INTRAVENOUS at 13:57

## 2022-02-07 RX ADMIN — ACETAMINOPHEN 650 MG: 325 TABLET ORAL at 17:52

## 2022-02-07 RX ADMIN — GABAPENTIN 300 MG: 300 CAPSULE ORAL at 22:28

## 2022-02-07 RX ADMIN — LEVOTHYROXINE SODIUM 50 MCG: 0.05 TABLET ORAL at 05:02

## 2022-02-07 RX ADMIN — Medication: at 17:53

## 2022-02-07 RX ADMIN — ACETAMINOPHEN 650 MG: 325 TABLET ORAL at 23:32

## 2022-02-07 RX ADMIN — Medication 1 AMPULE: at 08:52

## 2022-02-07 RX ADMIN — SODIUM CHLORIDE, PRESERVATIVE FREE 10 ML: 5 INJECTION INTRAVENOUS at 06:00

## 2022-02-07 RX ADMIN — ENOXAPARIN SODIUM 30 MG: 100 INJECTION SUBCUTANEOUS at 22:28

## 2022-02-07 RX ADMIN — Medication 1 AMPULE: at 22:29

## 2022-02-07 RX ADMIN — GABAPENTIN 300 MG: 300 CAPSULE ORAL at 17:52

## 2022-02-07 RX ADMIN — OXYCODONE 10 MG: 5 TABLET ORAL at 05:03

## 2022-02-07 RX ADMIN — DICLOFENAC SODIUM 2 G: 10 GEL TOPICAL at 17:53

## 2022-02-07 RX ADMIN — Medication: at 09:00

## 2022-02-07 RX ADMIN — SODIUM CHLORIDE, PRESERVATIVE FREE 10 ML: 5 INJECTION INTRAVENOUS at 14:15

## 2022-02-07 RX ADMIN — DICLOFENAC SODIUM 2 G: 10 GEL TOPICAL at 08:55

## 2022-02-07 RX ADMIN — DICLOFENAC SODIUM 2 G: 10 GEL TOPICAL at 13:00

## 2022-02-07 RX ADMIN — ACETAMINOPHEN 650 MG: 325 TABLET ORAL at 05:02

## 2022-02-07 RX ADMIN — Medication: at 22:31

## 2022-02-07 RX ADMIN — SODIUM CHLORIDE, PRESERVATIVE FREE 10 ML: 5 INJECTION INTRAVENOUS at 22:29

## 2022-02-07 RX ADMIN — DICLOFENAC SODIUM 2 G: 10 GEL TOPICAL at 22:29

## 2022-02-07 RX ADMIN — CYCLOBENZAPRINE 10 MG: 10 TABLET, FILM COATED ORAL at 13:57

## 2022-02-07 NOTE — PROGRESS NOTES
Problem: Self Care Deficits Care Plan (Adult)  Goal: *Acute Goals and Plan of Care (Insert Text)  Description: FUNCTIONAL STATUS PRIOR TO ADMISSION: Lives with her mother, who is able to care for herself, but can not take care of the patient. History of frequent falls due to her R knee \"giving out\". Sleeping on the sofa. She indicates that the can not use a RW in the house \"because there is not enough room for it. \" She reports having difficulty getting dressed; likely does not get dressed every day. HOME SUPPORT: Mother    Occupational Therapy Goals  2/4/2022:  Goals were not achieved. All Goals remain appropriate to continue for 7 days,. Initiated 1/29/2022  1. Patient will perform grooming standing up to 2 minutes with contact guard assist within 7 day(s). 2.  Patient will perform lower body dressing with minimal assistance using AE as needed within 7 day(s). 3.  Patient will perform toilet transfers with supervision/SBA within 7 day(s). 4.  Patient will perform all aspects of toileting with minimal assistance within 7 day(s). 5.  Patient will participate in upper extremity therapeutic exercise/activities with Min cues for 10 minutes within 7 day(s). Outcome: Progressing Towards Goal    OCCUPATIONAL THERAPY TREATMENT  Patient: Sha Hassan (50 y.o. female)  Date: 2/7/2022  Diagnosis: Falls [W19. XXXA]  Inability to ambulate due to multiple joints [R26.2]  Cervical radiculopathy [M54.12]  Right knee pain [M25.561] <principal problem not specified>  Procedure(s) (LRB):  C3-4, C4-5, C5-6 ANTERIOR CERVICAL DISCECTOMY AND  FUSION  (N/A) 4 Days Post-Op  Precautions: Fall  Chart, occupational therapy assessment, plan of care, and goals were reviewed. ASSESSMENT  Patient motivated to participate in therapy today, but she was distractible at times, somewhat anxious and required cueing to ensure her safety during transfer training.  Overall she was mod A of 1 to mod/max A of 2 for bed mobility, and she was mod A of 2 for lateral scooting transfers to a drop arm recliner. In regard to ADLs she performed several grooming activities in sitting, which included the management of containers, at a supervision/setup level. The patient's BUEs are generally functional, with the exception of her FM coordination being somewhat impaired in her R hand 2/2 having numbness and paresthesias. She remains limited by BLE weakness, a h/o of R knee buckling, obesity, decreased balance, decreased activity tolerance and decreased safety awareness. At this time the patient continues to benefit from acute OT she will need SNF rehab after discharge. PLAN :  Patient continues to benefit from skilled intervention to address the above impairments. Continue treatment per established plan of care. to address goals. Recommendation for discharge: (in order for the patient to meet his/her long term goals)  Inpatient Rehab           OBJECTIVE DATA SUMMARY:   Cognitive/Behavioral Status:  Neurologic State: Alert  Orientation Level: Oriented X4  Cognition: Decreased attention/concentration; Follows commands; Impaired decision making        Safety/Judgement: Decreased awareness of need for safety    Functional Mobility and Transfers for ADLs:  Bed Mobility:  Supine to Sit: Moderate assistance; Additional time  Scooting: Moderate assistance;Maximum assistance;Assist x2    Transfers:        Bed to Chair: Moderate assistance; Additional time;Assist x2 scooting laterally to drop arm chair    Balance:  Sitting - Static: Good (unsupported); Supported sitting  Sitting - Dynamic: Fair (occasional); Supported sitting    ADL Intervention:    Grooming  Grooming Assistance: Supervision;Set-up  Position Performed: Seated in chair  Washing Face: Supervision;Set-up  Washing Hands: Supervision;Set-up (retrieval of hand wipe from package)  Brushing Teeth: Supervision;Set-up  Brushing/Combing Hair: Supervision;Set-up    Cognitive Retraining  Safety/Judgement: Decreased awareness of need for safety      Pain:  No complaint of pain    Activity Tolerance:   Fair  Please refer to the flowsheet for vital signs taken during this treatment.     After treatment patient left in no apparent distress:   Sitting in chair and Call bell within reach    COMMUNICATION/COLLABORATION:   The patients plan of care was discussed with: Physical Therapist and Registered Nurse    Emelyn James OTR/L  Time Calculation: 32 mins `

## 2022-02-07 NOTE — PROGRESS NOTES
Hospitalist Progress Note    NAME: Todd Tracey   :  1980   MRN:  [id number]       Assessment / Plan:  Severe Cervical Spinal stenosis with Radiculopathy POA  Myelopathy with spinal cord compression  S/p anterior cervical discectomy decompression and fusionC3-C4, C4-C5 and C5-C6-2/3. Right knee pain POA  Recurrent falls. Physical deconditioning  History of arthritis  H/o L ankle Fracture s/p ORIF at [facility] ortho  Hypothyroidism (newly diagnosed) POA  Morbid obesity POA  Tobacco abuse     MRI C Spine=  1. Disc protrusions C4-C5 and C5-C6, with severe canal stenosis and cord  flattening. 2. Probable abnormal cord signal C4-C5. Possible abnormal cord signal C5-C6. 3. Moderate canal stenosis C3-C4. 4. Neural foraminal stenoses: bilateral C3-C4, right and severe left C4-C5, left  C5-C6.     Pt was under observation stay- changed to IP  after 2 [place] stay - failed outpatient management/treatment  Cont PO oxycodone prn severe pain only- avoid as able  Cont falls precautions  IP PT OT consult noted-- recommends IPR  IP  consulted- working on [employer] placement  IP Ortho consult appreciated-   - Generalized bony knee pain due to overload - NO KNEE intervention recommended  -  Wt loss through dietary changes   - Rehab for upper and lower body strengthening  Neuro eval for foot drop appreciated. Neurosurgery on boards/p S/p anterior cervical discectomy decompression and fusionC3-C4, C4-C5 and C5-C6-2/3. Dysphagia:  Post operative  Neurosurgery mentions that this is not unusal for patients to have some difficulty swallowing especially after undergoing multilevel discectomy  Mentions its getting better. Xray C spine:  Recent anterior cervical fusion with prevertebral soft tissue  swelling as above.     Hypothyroidism:    TSH = 11.3, started on Synthroid daily, Outpatient TSH as outpatient in 4 weeks for further dose titration     Active smoker:  Counselled on cessation  Patient declined nicotine patch.     Dispositionpatient medically stable for discharge home pending placement.     Code Status: Full code  Surrogate Decision Maker: Mother 79 yrs old at home who cant help physically     DVT Prophylaxis: Lovenox  GI Prophylaxis: not indicated     Baseline: Ambulatory     Estimated discharge date: Medically ready  Barriers: Dispositioninpatient rehab     Recommended Disposition: [facility]/Rehab     Subjective:     Chief Complaint / Reason for Physician Visit  Follow up for Back pain  She mentions her difficulty swallowing is very gradually getting better. She mentions having anxiety intermittently    Review of Systems:  Symptom Y/N Comments  Symptom Y/N Comments   Fever/Chills n   Chest Pain n    Poor Appetite n   Edema n    Cough    Abdominal Pain     Sputum    Joint Pain     SOB/BECKETT    Pruritis/Rash     Nausea/vomit    Tolerating PT/OT     Diarrhea    Tolerating Diet     Constipation    Other       Could NOT obtain due to:      Objective:     VITALS:   Last 24hrs VS reviewed since prior progress note. Most recent are:  Patient Vitals for the past 24 hrs:   Temp Pulse Resp BP SpO2   02/07/22 1139 97.3 °F (36.3 °C) 84 16 135/81 95 %   02/07/22 0819 98.1 °F (36.7 °C) 91 16 (!) 119/52 91 %   02/07/22 0410 98.7 °F (37.1 °C) 81 16 (!) 144/68 94 %   02/06/22 2103 98.3 °F (36.8 °C) 89 15 (!) 122/52 96 %     No intake or output data in the 24 hours ending 02/07/22 1535     I had a face to face encounter and independently examined this patient on 2/7/2022, as outlined below:  PHYSICAL EXAM:  General: WD, WN. Alert, cooperative, no acute distress    EENT:  EOMI. Anicteric sclerae. MMM, Incision site at lower neck covered with dressing, is dry  Resp:  CTA bilaterally, no wheezing or rales. No accessory muscle use  CV:  Regular  rhythm,  No edema  GI:  Soft, Non distended, Non tender. +Bowel sounds  Neurologic:  Alert and oriented X 3, normal speech,   Psych:   Good insight.  Not anxious nor agitated  Skin:  No ashlee. No jaundice    Reviewed most current lab test results and cultures  YES  Reviewed most current radiology test results   YES  Review and summation of old records today    NO  Reviewed patient's current orders and MAR    YES  PMH/SH reviewed - no change compared to H&P  ________________________________________________________________________  Care Plan discussed with:    Comments   Patient y    Family      RN y    Care Manager     Consultant                        Multidiciplinary team rounds were held today with , nursing, pharmacist and clinical coordinator. Patient's plan of care was discussed; medications were reviewed and discharge planning was addressed. ________________________________________________________________________  Total NON critical care TIME: 39  Minutes    Total CRITICAL CARE TIME Spent:   Minutes non procedure based      Comments   >50% of visit spent in counseling and coordination of care     ________________________________________________________________________  Dilan Perry MD     Procedures: see electronic medical records for all procedures/Xrays and details which were not copied into this note but were reviewed prior to creation of Plan. LABS:  I reviewed today's most current labs and imaging studies. Pertinent labs include:  Recent Labs     02/06/22  0410   WBC 8.7   HGB 10.6*   HCT 32.3*        No results for input(s): NA, K, CL, CO2, GLU, BUN, CREA, CA, MG, PHOS, ALB, TBIL, TBILI, ALT, INR, INREXT, INREXT in the last 72 hours.     No lab exists for component: SGOT    Signed: Dilan Perry MD

## 2022-02-07 NOTE — PROGRESS NOTES
Ortho / Neurosurgery NP Note    POD# 4 s/p C3-4, C4-5, C5-6 ANTERIOR CERVICAL DISCECTOMY AND  FUSION    Pt seen with no visitor present. Pt resting in bed, in NAD. Awake and alert, watching TV. Reports minimal post-op pain, using little oxycodone. State UE strength improved and numbness in rom hands has resolved, intermittent tingling   Reports RLE numbness from knee to foot is unchanged from prior to surgery, denies tingling   Reports knee pain over patella unchanged from admission, likely related to PTA injury    Continues to have issues with hard and soft c-collars \"too uncomfortable\". Refusing to wear collar while in bed, states she will wear with any activity including bed positioning. Continue to explain reasoning for collar      Also noticed patient itching around incision; nails filled with dirt. Explained risk of infection. Area cleaned thoroughly with chg and covered with optifoam.     States swallowing and sore throat improving. Prefers to stay on soft diet due to food choices. Dislikes hospital food. Tolerating Ensure. Strong voice unchanged from pre-op. Current smoker. Denies nausea. Multiple BMs since surgery. VSS Afebrile. Room air.      Visit Vitals  BP (!) 119/52   Pulse 91   Temp 98.1 °F (36.7 °C)   Resp 16   Ht 5' 2\" (1.575 m)   Wt 136.1 kg (300 lb)   SpO2 91%   BMI 54.87 kg/m²       Voiding status: + void  Output (mL)  Urine Voided: 750 ml (02/01/22 0807)  Last Bowel Movement Date: 02/07/22 (02/07/22 0900)  Unmeasurable Output  Urine Occurrence(s): 1 (02/05/22 2221)  Stool Occurrence(s): 1 (02/07/22 0922)      Labs    Lab Results   Component Value Date/Time    HGB 10.6 (L) 02/06/2022 04:10 AM      Lab Results   Component Value Date/Time    INR 1.0 02/02/2022 10:22 AM      Lab Results   Component Value Date/Time    Sodium 135 (L) 02/04/2022 04:26 AM    Potassium 4.0 02/04/2022 04:26 AM    Chloride 105 02/04/2022 04:26 AM    CO2 25 02/04/2022 04:26 AM    Glucose 94 02/04/2022 04:26 AM    BUN 12 02/04/2022 04:26 AM    Creatinine 0.80 02/04/2022 04:26 AM    Calcium 8.4 (L) 02/04/2022 04:26 AM     Recent Glucose Results:   No results found for: GLU, GLUPOC, GLUCPOC    Body mass index is 54.87 kg/m². : A BMI > 30 is classified as obesity and > 40 is classified as morbid obesity. Dermabond dressing c.d.i. No drainage or swelling noted, mild erythema directly at incision line. Cleaned around incision with chg, optifoam applied to over and protect incision. HV Drain removed 2/4  Calves soft and supple; No pain with passive stretch  Sensation and motor intact - BUE  4+/5, deltoids/biceps 4+/5  LLE PF/DF 4+/5. RLE minimal PF/DF. Negative EHL. Left hip flexor 4-/5. Right hip flexor 2/5  Knee with scantbruise over patella. Lovenox for DVT proph     PLAN:  1) PT/OT - Continue to encourage c-collar   2) Knee pain - continue voltaren gel. Dr. Elena Beck would like to avoid restarting Meloxicam /NSAIDs to avoid complications with surgical fusion. 3) Pain control - scheduled tylenol, prn oxycodone. Chloraseptic for sore throat. Will change bowel regimen to prn given multiple soft stools and little use of oxy. 4) RLE numbness and BUE tingling - discussed symptoms with Dr Elena Beck, will start on gabapentin 300 mg TID. Will reassess dosing and how she tolerates it. 5) Smoking cessation - patient continues to ask to go outside to smoke, declined nicotine patch. Reviewed and aware of risk for decreased healing and increase risk of infection. 6) Discharge planning - patient is a great candidate for IP Rehab to regain her recent loss of function due to severe spinal stenosis.        Vlad Valdivia NP

## 2022-02-07 NOTE — PROGRESS NOTES
Problem: Mobility Impaired (Adult and Pediatric)  Goal: *Acute Goals and Plan of Care (Insert Text)  Description: FUNCTIONAL STATUS PRIOR TO ADMISSION: recurrent falls due to R knee buckling; has difficulty getting off sofa where she sleeps; reports home is too cluttered to use a RW. HOME SUPPORT PRIOR TO ADMISSION: The patient lived with mother in 1 story home with 4 steps to enter and unstable rails. Mother assists patient with ADLs and meals. Physical Therapy Goals  Initiated 1/29/2022. Re-Eval 2/4/2022 due to ACDF, goals remain appropriate. 1.  Patient will move from supine to sit and sit to supine , scoot up and down, and roll side to side in bed with supervision/set-up within 7 day(s). 2.  Patient will transfer from bed to chair and chair to bed with minimal assistance/contact guard assist using the least restrictive device within 7 day(s). 3.  Patient will perform sit to stand with minimal assistance/contact guard assist within 7 day(s). 4.  Patient will ambulate with minimal assistance/contact guard assist for 35 feet with the least restrictive device within 7 day(s). 5.  Patient will ascend/descend 4 stairs with 1 handrail(s) with minimal assistance/contact guard assist within 7 day(s). Outcome: Progressing Towards Goal   PHYSICAL THERAPY TREATMENT  Patient: Todd Tracey (65 y.o. female)  Date: 2/7/2022  Diagnosis: Falls [W19. XXXA]  Inability to ambulate due to multiple joints [R26.2]  Cervical radiculopathy [M54.12]  Right knee pain [M25.561] <principal problem not specified>  Procedure(s) (LRB):  C3-4, C4-5, C5-6 ANTERIOR CERVICAL DISCECTOMY AND  FUSION  (N/A) 4 Days Post-Op  Precautions: Fall, hard c-collar at all times upright/ with mobility  Chart, physical therapy assessment, plan of care and goals were reviewed. ASSESSMENT  Patient continues with skilled PT services and is progressing towards goals.  Pt received supine in bed, no collar on, agreeable to PT session with encouragement. Max encouragement for donning hard aspen collar, dependent x2 to don. Pt performed supine > sit EOB with rail and Mod A x2. Scooting edge of bed with Mod-Max A. Transfer training for lateral transfer bed > drop arm recliner chair with multiple scoots. Refused use of slide board despite education. Mod A x3 persons for scooting assist (blocking knees and 2 persons assist from side for scooting). Mod A x2 for positioning in chair but pt tolerating sitting upright at end of PT portion of session. Pt left with OT to complete session, seated in recliner chair, all needs met. Current Level of Function Impacting Discharge (mobility/balance): 2-3 persons Mod-Max A for mobility. R LE buckling with standing/ stepping. Other factors to consider for discharge: Lives alone. PLAN :  Patient continues to benefit from skilled intervention to address the above impairments. Continue treatment per established plan of care. to address goals. Recommendation for discharge: (in order for the patient to meet his/her long term goals)  Therapy 3 hours per day 5-7 days per week    This discharge recommendation:  Has been made in collaboration with the attending provider and/or case management    IF patient discharges home will need the following DME: to be determined (TBD)       SUBJECTIVE:   Patient stated I hate this collar, it's like a vice.     OBJECTIVE DATA SUMMARY:   Critical Behavior:  Neurologic State: Alert,Appropriate for age  Orientation Level: Oriented X4  Cognition: Follows commands  Safety/Judgement: Awareness of environment,Fall prevention,Insight into deficits,Decreased insight into deficits (learning about deficits; anxious re deficits)  Functional Mobility Training:  Bed Mobility:     Supine to Sit: Moderate assistance; Additional time     Scooting: Moderate assistance;Maximum assistance;Assist x2        Transfers:              Bed to Chair: Moderate assistance; Additional time;Assist x2 Sliding Board :  (refused slide board. performed lateral transfer)           Balance:  Sitting - Static: Good (unsupported); Supported sitting  Sitting - Dynamic: Fair (occasional); Supported sitting      Pain Rating:  None reported. Activity Tolerance:   Fair and requires rest breaks    After treatment patient left in no apparent distress:   Sitting in chair, Call bell within reach, and Bed / chair alarm activated. OT present to complete portion of session. COMMUNICATION/COLLABORATION:   The patients plan of care was discussed with: Occupational therapist and Registered nurse.      Andrea Bond, PT, DPT, NCS   Time Calculation: 17 mins

## 2022-02-07 NOTE — PROGRESS NOTES
End of Shift Note    Bedside shift change report given to Whitney Brunner RN (oncoming nurse) by Marsha Zavala LPN (offgoing nurse). Report included the following information SBAR, Kardex, Procedure Summary, Intake/Output, MAR and Recent Results    Shift worked:  night     Shift summary and any significant changes:          Concerns for physician to address:       Zone phone for oncoming shift:   [phone]       Activity:  Activity Level: Bed Rest  Number times ambulated in hallways past shift: 0  Number of times OOB to chair past shift: 0    Cardiac:   Cardiac Monitoring: No      Cardiac Rhythm: Sinus Rhythm    Access:   Current line(s): PIV     Genitourinary:   Urinary status: voiding and incontinent    Respiratory:   O2 Device: Nasal cannula  Chronic home O2 use?: NO  Incentive spirometer at bedside: YES  Actual Volume (ml): 750 ml  GI:  Last Bowel Movement Date: 02/05/22  Current diet:  ADULT DIET Full Liquid  DIET ONE TIME MESSAGE  ADULT ORAL NUTRITION SUPPLEMENT Breakfast, Lunch, Dinner; Low Calorie/High Protein  DIET ONE TIME MESSAGE  DIET ONE TIME MESSAGE  Passing flatus: YES  Tolerating current diet: YES       Pain Management:   Patient states pain is manageable on current regimen: YES    Skin:  Tru Score: 11  Interventions: float heels, increase time out of bed, foam dressing, PT/OT consult, limit briefs and nutritional support     Patient Safety:  Fall Score:  Total Score: 3  Interventions: bed/chair alarm, assistive device (walker, cane, etc), gripper socks, pt to call before getting OOB and stay with me (per policy)  High Fall Risk: Yes    Length of Stay:  Expected LOS: 2d 12h  Actual LOS: 7      Marsha Zavala LPN

## 2022-02-07 NOTE — PROGRESS NOTES
Transition of Care Plan:  RUR: 11% low   Disposition: IPR: [facility]: insurance auth required   Follow up appointments: Follow up with PCP and/or Specialist   DME needed: Facility will provide   Transportation at [address] required   [address] or means to access home:    N/A     Medicare Letter: N/A  Is patient a BCPI-A Bundle:  CM will provide if required                     If yes, was Bundle Letter given?:    Is patient a [place] and connected with the [place]? N/A               If yes, was [facility] transfer form completed and [facility] notified? Caregiver Contact: Sarah Garcias (mother) [phone]  Discharge Caregiver contacted prior to discharge? Family to be contacted  Care Conference needed?:  Not at this time    Update  Confirmation received from [facility] reporting Unruly Gaytan has been initiated. Initial note  [facility] IPR has accepted. CM requested insurance auth be initiated.      Jordon Fajardo, [facility]

## 2022-02-07 NOTE — ADT AUTH CERT NOTES
Fabiola Smith         [id number]    Patient Demographics    Patient Name   Kary Goldsmith   [id number] Legal Sex   Female    1980 Address   [address] [place] [address]   [address]   [address] [id number] Phone   [phone] (Home)   [phone] (Mobile)   CSN:   [id number]     [address] [place] [address] Date: Admit Time Room Bed   2022 10:46 PM 3265 [[id number]] 01 [[id number]]         Utilization Reviews         Neurology [address] [place] [address] Day 9 (2022) by Ceasar Duron RN       Review Entered Review Status   2022 18:10 Completed      Criteria Review      Care Day: 9 Care Date: 2022 Level of Care: Inpatient Floor    Guideline Day 3    Level Of Care    (X) * Activity level acceptable    2022 18:09:49 EST by Ceasar Duron      ambulate q8    ( ) * Complete discharge planning    Clinical Status    (X) * No infection, or status acceptable    (X) * Isolation not needed, or status acceptable    ( ) * Respiratory status acceptable    2022 18:09:49 EST by Ceasar Agent      93% 2L NC    (X) * Pain and nausea absent or adequately managed    2022 18:09:49 EST by Ceasar Agent      tylenol 650mg po q6, oxycodone 10mg po q3 prn given x2,    (X) * Ventilatory status acceptable    ( ) * Neurologic problems absent or stabilized    2022 18:09:56 EST by Ceasar Agent      trouble swallowing    ( ) * Muscle or nerve damage absent or stable    ( ) * General Discharge Criteria met    Interventions    (X) * Intake acceptable    2022 18:09:49 EST by Ceasar Agent      full liquid diet    ( ) * No inpatient interventions needed    * Milestone   Additional Notes         VS: 98.2, 94, 133/70, 16, 93% 2L NC      Results: h/h 11.3/33.8,       Xray c-psine: Recent anterior cervical fusion with prevertebral soft tissue   swelling as above.       Tx: Full code, wound care q8, I&O, ICS, vitals per routine, neuro checks per routine      Plan:   Severe Cervical Spinal stenosis with Radiculopathy POA   Myelopathy with spinal cord compression   S/p anterior cervical discectomy decompression and fusion-C3-C4, C4-C5 and C5-C6-2/3. Right knee pain POA   Recurrent falls. Physical deconditioning   History of arthritis   H/o L ankle Fracture s/p ORIF at [facility] ortho   Hypothyroidism (newly diagnosed) POA   Morbid obesity POA   Tobacco abuse    MRI C Spine=   1. Disc protrusions C4-C5 and C5-C6, with severe canal stenosis and cord   flattening. 2. Probable abnormal cord signal C4-C5. Possible abnormal cord signal C5-C6. 3. Moderate canal stenosis C3-C4. 4. Neural foraminal stenoses: bilateral C3-C4, right and severe left C4-C5, left   C5-C6.       Pt was under observation stay- changed to IP 1/31 after 2 MN stay - failed outpatient management/treatment   Started on Mobic per recommendations of Orthopedic team- cont daily scheduled   Cont PO oxycodone prn severe pain only- avoid as able   Cont falls precautions   IP PT OT consult noted-- recommends IPR   IP  consulted- working on [employer] placement   IP Ortho consult appreciated-    - Generalized bony knee pain due to overload - NO KNEE intervention recommended   -  Wt loss through dietary changes    -  Considering adding once a day NSAID- Celebrex or mobic   - Rehab for upper and lower body strengthening   - AFO if R foot drop persists   - IP Neurology consulted for recommendations on R Foot drop- noted, MRI C & L spine ordered. Neurosurgery on board-did the surgery yesterday has mentioned above.  Postop day 1. PT and OT recommended inpatient rehab.  Case management notified. Mick Gurerier medically stable for discharge to the inpatient rehab. HbA1c- 5.6   TSH = 11.3, started on Synthroid daily, Outpatient TSH as outpatient in 3 weeks for further dose titration   Patient declined nicotine patch.          Exam:   General:          WD, WN. Alert, cooperative, no acute distress, Morbid Obese +     EENT:              EOMI. Anicteric sclerae.  MMM   Resp:               CTA bilaterally, no wheezing or rales.  No accessory muscle use   CV:                  Regular  rhythm,  No edema   GI:                   Soft, Non distended, Non tender.  +Bowel sounds   Neurologic:       Alert and oriented X 3, normal speech,    Psych:   Good insight. Not anxious nor agitated   Skin:                No rashes.  No jaundice      Neuro: No problems with hardware placement at operative site or alignment of the C spine  Pt awaiting placement to rehab  Not unusual for patients to have some difficulty swallowing especially after undergoing multilevel discectomy as in this case      PT:   ASSESSMENT   Patient continues with skilled PT services and is progressing towards goals, doing better with all mobility. Pt received in bed, no collar in place. Hard collar donned at beginning of session for activity. Pt able to prop self up in bed to assist with donning. Pt able to move to edge of bed with mod A of 1 using rail. She shows good sitting balance. She was able to come to stand x2 with min A of 2 to standing at walker. She was able to stand and maintain with CGA of 2. She does shift weight toward stronger leg on L but did not buckle this session like last. She was able to side step with the walker with min A of 1 and CGA of 1 having more difficulty adducting RLE but able to accept weight well for step with L. She returned to sitting with control and min A of 2. She was sitting well at edge of bed but leaned backward and began to slide on edge of bed and then needed A of 3 to return safely to supine. Pt able to tolerate LE exercises in bed: 10x AAROM hip/knee flexion, hip abduction, isometric quad sets and glut sets, active ankle pumps although R active dorsiflexon limited and PROM complete for improving range.  Sister in for session and is very eager to assist. Educated pt and sister on exercises they could complete with sister's assist when visiting and ones pt could work on in bed on her own.        Current Level of Function Impacting Discharge (mobility/balance): see above details       Other factors to consider for discharge: will need inpatient rehab prior to return home given cord compression, surgery and current deficits; need for assist of 2 for mobility; not yet safe for amb away from bed            PLAN :   Patient continues to benefit from skilled intervention to address the above impairments.  Continue treatment per established plan of care.    to address goals.       Recommendation for discharge: (in order for the patient to meet his/her long term goals)   Therapy 3 hours per day 5-7 days per week              Neurology [facility] Day 8 (2/4/2022) by Kristin Hanson RN       Review Entered Review Status   2/4/2022 16:45 Completed      Criteria Review      Care Day: 8 Care Date: 2/4/2022 Level of Care:    Guideline Day 3    Level Of Care    (X) * Activity level acceptable    2/4/2022 16:45:56 EST by Lottie Melchor      WORKING WITH PT UP AD MIKE W/ ASSISTANCE    ( ) * Complete discharge planning    Clinical Status    (X) * No infection, or status acceptable    2/4/2022 16:45:56 EST by Lottie Melchor      NONE NOTED    (X) * Isolation not needed, or status acceptable    2/4/2022 16:45:56 EST by Lottie Melchor      N/A    (X) * Respiratory status acceptable    2/4/2022 16:45:56 EST by Lottie Melchor      RESP 18 95% ROOM AIR    (X) * Pain and nausea absent or adequately managed    2/4/2022 16:45:56 EST by Lottie Melchor      MANAGED Roxicodone 10mg po q3 prn x3    (X) * Ventilatory status acceptable    2/4/2022 16:45:56 EST by Lottie Melchor      ROOM AIR    (X) * Neurologic problems absent or stabilized    2/4/2022 16:45:56 EST by Lottie Melchor      Sensation and motor intact - BUE  4+/5, deltoids/biceps 4+/5    (X) * Muscle or nerve damage absent or stable    2/4/2022 16:45:56 EST by Lottie Melchor      N/A    ( ) * General Discharge Criteria met    Interventions    (X) * Intake acceptable    2/4/2022 16:45:56 EST by Lottie Melchor      ADULT FULL LIQUID DIET    ( ) * No inpatient interventions needed    * Milestone   Additional Notes   2/4/2022   LOC IP ORTHO   VS 98.5 103 143/74 18 95% ROOM AIR   LABS     WBC: 10.0   NRBC: 0.0   RBC: 3.06 (L)   HGB: 11.3 (L)   HCT: 33.8 (L)   Sodium: 135 (L)   Potassium: 4.0   Chloride: 105   CO2: 25   Anion gap: 5   Glucose: 94   BUN: 12   Creatinine: 0.80   BUN/Creatinine ratio: 15   Calcium: 8.4 (L)      MEDS   Tylenol 650mg po q6   voltaren 2g topically qid   Synthroid 50mcg po qday   Roxicodone 10mg po q3 prn x3   NS @ 125 cc/hr   lovenox 40mg sc qday      ATTENDING NOTE   POD# 1 s/p C3-4, C4-5, C5-6 ANTERIOR CERVICAL DISCECTOMY AND  FUSION     Pt seen with no visitor present.        Pt resting in bed. Alert, oriented   Watching TV.  Upon my entry she was speaking clearly nd asked me to turn off TV   Continued to have issue with c-collar and spoke with [employer] who was ok with use of soft collar instead. Pt yelling that \"this thing is too tight, take it off\". ...educated about why collar is needed. She states she \"cant breathe\".  Collar is loose enough that I can get most of my fingers around all edges.       Pt yelling initially about pain in right knee \"I can't move it\"       Reports numbness in rom hands has resolved and strength feels improved   Unsure of changes in BLE as of yet, has not been oob. And knee pain so great that she will not move toes or feet.       Also c/o sore throat, hoarse voice unchanged from pre-op. Current smoker. Denies nausea        VSS Afebrile. On room air. While yelling and focused on pain or collar she hyperventilates. Coached on slowing breathing down.  Able to briefly calm her. When distracted with questions about her knee her breathing returns to normal rate.       She states she \"can't swallow\" but given water and she successfully swallowed sips.    Dressing c.d.i   HV Drain removed earlier this morning. Calves soft and supple; No pain with passive stretch   Sensation and motor intact - BUE  4+/5, deltoids/biceps 4+/5   Pt uncooperative to further assessment of LE   Knee with bruise over patella.     SCDs for mechanical DVT proph while in bed       PLAN:   1) PT/OT - c-collar changed to soft collar   2) Knee pain - will add voltaren gel. Dr. Carlee Vaughn would like to avoid restarting Meloxicam /NSAIDs to avoid complications with surgical fusion. 3) Pain control - scheduled tylenol, prn oxycodone. Chloraseptic for sore throat. PRN oxycodone available dose now RN notified.  WIll add bowel regimen. 4) HV drain - removed today and ok with Dr. Carlee Vaughn to resume Lovenox after drain removed today   5) Discharge planning - patient is a great candidate for IP Rehab to regain her recent loss of function due to severe spinal stenosis. INTERNAL MEDICINE NOTE   Dressing c.d.i   HV Drain removed earlier this morning. Calves soft and supple; No pain with passive stretch   Sensation and motor intact - BUE  4+/5, deltoids/biceps 4+/5   Pt uncooperative to further assessment of LE   Knee with bruise over patella.     SCDs for mechanical DVT proph while in bed       PLAN:   1) PT/OT - c-collar changed to soft collar   2) Knee pain - will add voltaren gel. Dr. Carlee Vaughn would like to avoid restarting Meloxicam /NSAIDs to avoid complications with surgical fusion. 3) Pain control - scheduled tylenol, prn oxycodone. Chloraseptic for sore throat. PRN oxycodone available dose now RN notified.  WIll add bowel regimen. 4) HV drain - removed today and ok with Dr. Carlee Vaughn to resume Lovenox after drain removed today   5) Discharge planning - patient is a great candidate for IP Rehab to regain her recent loss of function due to severe spinal stenosis.       PHYSICAL THERAPY NOTE   ASSESSMENT   Based on the objective data described below, the patient presents with impairments in strength, activity tolerance, and overall mobility below her functional baseline. Pt presents for re-evaluation today due to new C3-7 ACDF on 2/2/2022. Pt received supine in bed, soft collar on, agreeable to PT session with encouragement. Education provided on need for hard collar for all mobility and changed soft to hard collar with dependent A x2-3. Pt performed rolling and supine > sit edge of bed Mod A x2. Sitting balance intact edge of bed with propped sit. Vitals stable throughout. Sit <> stand edge of bed with Mod A x2 and RW. Pt with difficulty holding onto RW requiring assist under armpits. Side stepping x3ft to [facility] with Mod a x2 and RW in front. After seated rest, pt trialed 2nd stand with Mod A x2. R knee buckling with attempted return to sit, requiring Total assist x3 to return to supine and prevent fall. Total A x2-3 to boost in bed for further nursing care. Pt continues to benefit from therapy services, will need IP Rehab stay upon DC for further mobility intervention.        Current Level of Function Impacting Discharge (mobility/balance): Mod-Max A x2-3        Functional Outcome Measure:  The patient scored 30/100 on the Barthel Index outcome measure which is indicative of 50% dependence in mobility and ADLs.          Other factors to consider for discharge: complex medical history, progressive myelitis       Patient will benefit from skilled therapy intervention to address the above noted impairments.         PLAN :   Recommendations and Planned Interventions: bed mobility training, transfer training, gait training, therapeutic exercises, neuromuscular re-education, patient and family training/education, and therapeutic activities         Frequency/Duration: Patient will be followed by physical therapy:  daily to address goals.       Recommendation for discharge: (in order for the patient to meet his/her long term goals)   Therapy 3 hours per day 5-7 days per week       This discharge recommendation:   Has been made in collaboration with the attending provider and/or case management       Equipment recommendations for successful discharge (if) home: to be determined               SUBJECTIVE:   Patient stated \"I want to try (standing). \"

## 2022-02-07 NOTE — PROGRESS NOTES
End of Shift Note    Bedside shift change report given to  (oncoming nurse) by Uri Brown (offgoing nurse). Report included the following information SBAR, Kardex, Procedure Summary, Intake/Output, MAR and Recent Results    Shift worked: Day     Shift summary and any significant changes:     Patient agreed to keep neck brace on most of the day. Up in chair during lunch return to bed took 3 assist. Pain managed some complaints of muscle spasms. Patient placed on speciality bed. Concerns for physician to address:  Placement? Zone phone for oncoming shift:   [phone]       Activity:  Activity Level: Bed Rest  Number times ambulated in hallways past shift: 0  Number of times OOB to chair past shift: 1    Cardiac:   Cardiac Monitoring: No      Cardiac Rhythm: Sinus Rhythm    Access:   Current line(s): PIV     Genitourinary:   Urinary status: voiding and incontinent    Respiratory:   O2 Device: None (Room air)  Chronic home O2 use?: NO  Incentive spirometer at bedside: YES  Actual Volume (ml): 800 ml  GI:  Last Bowel Movement Date: 02/07/22  Current diet:  ADULT DIET Full Liquid  DIET ONE TIME MESSAGE  ADULT ORAL NUTRITION SUPPLEMENT Breakfast, Lunch, Dinner; Low Calorie/High Protein  DIET ONE TIME MESSAGE  DIET ONE TIME MESSAGE  Passing flatus: YES  Tolerating current diet: YES       Pain Management:   Patient states pain is manageable on current regimen: YES    Skin:  Tru Score: 11  Interventions: float heels, increase time out of bed, foam dressing, PT/OT consult, limit briefs and nutritional support     Patient Safety:  Fall Score:  Total Score: 4  Interventions: bed/chair alarm, assistive device (walker, cane, etc), gripper socks, pt to call before getting OOB and stay with me (per policy)  High Fall Risk: Yes    Length of Stay:  Expected LOS: 2d 12h  Actual LOS: [place] 4

## 2022-02-07 NOTE — PROGRESS NOTES
Problem: Pressure Injury - Risk of  Goal: *Prevention of pressure injury  Description: Document Tru Scale and appropriate interventions in the flowsheet. Outcome: Progressing Towards Goal  Note: Pressure Injury Interventions:  Sensory Interventions: Assess changes in LOC    Moisture Interventions: Absorbent underpads    Activity Interventions: Increase time out of bed,PT/OT evaluation    Mobility Interventions: HOB 30 degrees or less,PT/OT evaluation,Float heels    Nutrition Interventions: Document food/fluid/supplement intake    Friction and Shear Interventions: Apply protective barrier, creams and emollients,HOB 30 degrees or less                Problem: Patient Education: Go to Patient Education Activity  Goal: Patient/Family Education  Outcome: Progressing Towards Goal     Problem: Falls - Risk of  Goal: *Absence of Falls  Description: Document Leslee Fall Risk and appropriate interventions in the flowsheet.   Outcome: Progressing Towards Goal  Note: Fall Risk Interventions:  Mobility Interventions: Communicate number of staff needed for ambulation/transfer    Mentation Interventions: Adequate sleep, hydration, pain control    Medication Interventions: Evaluate medications/consider consulting pharmacy    Elimination Interventions: Call light in reach    History of Falls Interventions: Evaluate medications/consider consulting pharmacy,Investigate reason for fall,Vital signs minimum Q4HRs X 24 hrs (comment for end date)         Problem: Patient Education: Go to Patient Education Activity  Goal: Patient/Family Education  Outcome: Progressing Towards Goal     Problem: Pain  Goal: *Control of Pain  Outcome: Progressing Towards Goal     Problem: Patient Education: Go to Patient Education Activity  Goal: Patient/Family Education  Outcome: Progressing Towards Goal     Problem: Surgical Pathway Post-Op Day 2 through Discharge  Goal: Nutrition/Diet  Outcome: Progressing Towards Goal  Goal: Discharge Planning  Outcome: Progressing Towards Goal  Goal: Respiratory  Outcome: Progressing Towards Goal  Goal: Treatments/Interventions/Procedures  Outcome: Progressing Towards Goal  Goal: Psychosocial  Outcome: Progressing Towards Goal  Goal: *No signs and symptoms of infection or wound complications  Outcome: Progressing Towards Goal

## 2022-02-08 PROCEDURE — 97530 THERAPEUTIC ACTIVITIES: CPT

## 2022-02-08 PROCEDURE — 74011250637 HC RX REV CODE- 250/637: Performed by: NURSE PRACTITIONER

## 2022-02-08 PROCEDURE — 97116 GAIT TRAINING THERAPY: CPT

## 2022-02-08 PROCEDURE — 92610 EVALUATE SWALLOWING FUNCTION: CPT

## 2022-02-08 PROCEDURE — 97530 THERAPEUTIC ACTIVITIES: CPT | Performed by: OCCUPATIONAL THERAPIST

## 2022-02-08 PROCEDURE — 65270000029 HC RM PRIVATE

## 2022-02-08 PROCEDURE — 74011250637 HC RX REV CODE- 250/637: Performed by: NEUROLOGICAL SURGERY

## 2022-02-08 PROCEDURE — 74011000250 HC RX REV CODE- 250: Performed by: NEUROLOGICAL SURGERY

## 2022-02-08 PROCEDURE — 74011250636 HC RX REV CODE- 250/636: Performed by: STUDENT IN AN ORGANIZED HEALTH CARE EDUCATION/TRAINING PROGRAM

## 2022-02-08 PROCEDURE — 97110 THERAPEUTIC EXERCISES: CPT

## 2022-02-08 PROCEDURE — 97535 SELF CARE MNGMENT TRAINING: CPT | Performed by: OCCUPATIONAL THERAPIST

## 2022-02-08 PROCEDURE — 94760 N-INVAS EAR/PLS OXIMETRY 1: CPT

## 2022-02-08 RX ORDER — PANTOPRAZOLE SODIUM 40 MG/1
40 TABLET, DELAYED RELEASE ORAL
Status: DISCONTINUED | OUTPATIENT
Start: 2022-02-09 | End: 2022-02-13 | Stop reason: HOSPADM

## 2022-02-08 RX ADMIN — SODIUM CHLORIDE, PRESERVATIVE FREE 10 ML: 5 INJECTION INTRAVENOUS at 07:13

## 2022-02-08 RX ADMIN — DICLOFENAC SODIUM 2 G: 10 GEL TOPICAL at 09:39

## 2022-02-08 RX ADMIN — DICLOFENAC SODIUM 2 G: 10 GEL TOPICAL at 21:10

## 2022-02-08 RX ADMIN — DICLOFENAC SODIUM 2 G: 10 GEL TOPICAL at 12:14

## 2022-02-08 RX ADMIN — ACETAMINOPHEN 650 MG: 325 TABLET ORAL at 07:12

## 2022-02-08 RX ADMIN — ACETAMINOPHEN 650 MG: 325 TABLET ORAL at 12:12

## 2022-02-08 RX ADMIN — ACETAMINOPHEN 650 MG: 325 TABLET ORAL at 23:41

## 2022-02-08 RX ADMIN — Medication 1 AMPULE: at 21:10

## 2022-02-08 RX ADMIN — SODIUM CHLORIDE, PRESERVATIVE FREE 10 ML: 5 INJECTION INTRAVENOUS at 14:31

## 2022-02-08 RX ADMIN — DICLOFENAC SODIUM 2 G: 10 GEL TOPICAL at 17:15

## 2022-02-08 RX ADMIN — GABAPENTIN 300 MG: 300 CAPSULE ORAL at 09:37

## 2022-02-08 RX ADMIN — ACETAMINOPHEN 650 MG: 325 TABLET ORAL at 17:12

## 2022-02-08 RX ADMIN — LEVOTHYROXINE SODIUM 50 MCG: 0.05 TABLET ORAL at 07:12

## 2022-02-08 RX ADMIN — Medication 1 AMPULE: at 09:36

## 2022-02-08 RX ADMIN — ENOXAPARIN SODIUM 30 MG: 100 INJECTION SUBCUTANEOUS at 09:36

## 2022-02-08 RX ADMIN — Medication: at 17:15

## 2022-02-08 RX ADMIN — SODIUM CHLORIDE, PRESERVATIVE FREE 10 ML: 5 INJECTION INTRAVENOUS at 21:15

## 2022-02-08 RX ADMIN — Medication: at 09:40

## 2022-02-08 RX ADMIN — ENOXAPARIN SODIUM 30 MG: 100 INJECTION SUBCUTANEOUS at 21:09

## 2022-02-08 RX ADMIN — Medication: at 21:10

## 2022-02-08 RX ADMIN — GABAPENTIN 300 MG: 300 CAPSULE ORAL at 17:12

## 2022-02-08 RX ADMIN — GABAPENTIN 300 MG: 300 CAPSULE ORAL at 21:10

## 2022-02-08 NOTE — PROGRESS NOTES
Hospitalist Progress Note    NAME: Noman Shore   :  1980   MRN:  [id number]       Assessment / Plan:  Severe Cervical Spinal stenosis with Radiculopathy POA  Myelopathy with spinal cord compression  S/p anterior cervical discectomy decompression and fusionC3-C4, C4-C5 and C5-C6-2/3. Right knee pain POA  Recurrent falls. Physical deconditioning  History of arthritis  H/o L ankle Fracture s/p ORIF at [facility] ortho  Hypothyroidism (newly diagnosed) POA  Morbid obesity POA  Tobacco abuse     MRI C Spine=  1. Disc protrusions C4-C5 and C5-C6, with severe canal stenosis and cord  flattening. 2. Probable abnormal cord signal C4-C5. Possible abnormal cord signal C5-C6. 3. Moderate canal stenosis C3-C4. 4. Neural foraminal stenoses: bilateral C3-C4, right and severe left C4-C5, left  C5-C6.     Pt was under observation stay- changed to IP  after 2 [place] stay - failed outpatient management/treatment  Cont PO oxycodone prn severe pain only- avoid as able  Cont falls precautions  IP PT OT consult noted-- recommends IPR  IP  consulted- working on [employer] placement  IP Ortho consult appreciated-   - Generalized bony knee pain due to overload - NO KNEE intervention recommended  -  Wt loss through dietary changes   - Rehab for upper and lower body strengthening  Neuro eval for foot drop appreciated. Neurosurgery on boards/p S/p anterior cervical discectomy decompression and fusionC3-C4, C4-C5 and C5-C6-2/3. Dysphagia:  Post operative  Neurosurgery mentions that this is not unusal for patients to have some difficulty swallowing especially after undergoing multilevel discectomy  Mentions its getting better. Xray C spine:  Recent anterior cervical fusion with prevertebral soft tissue  swelling as above. SLP consulted.     Hypothyroidism:    TSH = 11.3, started on Synthroid daily, Outpatient TSH as outpatient in 4 weeks for further dose titration     Active smoker:  Counselled on cessation  Patient declined nicotine patch.     Dispositionpatient medically stable for discharge home pending placement.     Code Status: Full code  Surrogate Decision Maker: Mother 79 yrs old at home who cant help physically     DVT Prophylaxis: Lovenox  GI Prophylaxis: not indicated     Baseline: Ambulatory     Estimated discharge date: Medically ready  Barriers: Dispositioninpatient rehab     Recommended Disposition: [facility]/Rehab     Subjective:     Chief Complaint / Reason for Physician Visit  Follow up for Back pain  I had a very long conversation with her today as usual  Mentions her swallowing is very gradually getting better. Review of Systems:  Symptom Y/N Comments  Symptom Y/N Comments   Fever/Chills n   Chest Pain n    Poor Appetite n   Edema n    Cough    Abdominal Pain     Sputum    Joint Pain     SOB/BECKETT    Pruritis/Rash     Nausea/vomit    Tolerating PT/OT     Diarrhea    Tolerating Diet     Constipation    Other       Could NOT obtain due to:      Objective:     VITALS:   Last 24hrs VS reviewed since prior progress note. Most recent are:  Patient Vitals for the past 24 hrs:   Temp Pulse Resp BP SpO2   02/08/22 1104 98.3 °F (36.8 °C) 97 18 121/67 93 %   02/08/22 0737 98.3 °F (36.8 °C) 90 18 (!) 118/58 93 %   02/08/22 0334 98.4 °F (36.9 °C) (!) 105 18 130/75 93 %   02/08/22 0007 98.1 °F (36.7 °C) (!) 108 18 (!) 145/67 95 %   02/07/22 1928 98.4 °F (36.9 °C) 88 16 (!) 157/78 95 %   02/07/22 1534 97.9 °F (36.6 °C) 96 16 136/72 98 %     No intake or output data in the 24 hours ending 02/08/22 1419     I had a face to face encounter and independently examined this patient on 2/8/2022, as outlined below:  PHYSICAL EXAM:  General: WD, WN. Alert, cooperative, no acute distress    EENT:  EOMI. Anicteric sclerae. MMM, Incision site at lower neck covered with dressing, is dry  Resp:  CTA bilaterally, no wheezing or rales. No accessory muscle use  CV:  Regular  rhythm,  No edema  GI:  Soft, Non distended, Non tender.   +Bowel sounds  Neurologic:  Alert and oriented X 3, normal speech,   Psych:   Good insight. Not anxious nor agitated  Skin:  No rashes. No jaundice    Reviewed most current lab test results and cultures  YES  Reviewed most current radiology test results   YES  Review and summation of old records today    NO  Reviewed patient's current orders and MAR    YES  PMH/SH reviewed - no change compared to H&P  ________________________________________________________________________  Care Plan discussed with:    Comments   Patient y    Family      RN y    Care Manager     Consultant                        Multidiciplinary team rounds were held today with , nursing, pharmacist and clinical coordinator. Patient's plan of care was discussed; medications were reviewed and discharge planning was addressed. ________________________________________________________________________  Total NON critical care TIME: 39  Minutes    Total CRITICAL CARE TIME Spent:   Minutes non procedure based      Comments   >50% of visit spent in counseling and coordination of care     ________________________________________________________________________  Ac Medley MD     Procedures: see electronic medical records for all procedures/Xrays and details which were not copied into this note but were reviewed prior to creation of Plan. LABS:  I reviewed today's most current labs and imaging studies. Pertinent labs include:  Recent Labs     02/06/22  0410   WBC 8.7   HGB 10.6*   HCT 32.3*        No results for input(s): NA, K, CL, CO2, GLU, BUN, CREA, CA, MG, PHOS, ALB, TBIL, TBILI, ALT, INR, INREXT, INREXT in the last 72 hours.     No lab exists for component: SGOT    Signed: Ac Medley MD

## 2022-02-08 NOTE — PROGRESS NOTES
Problem: Self Care Deficits Care Plan (Adult)  Goal: *Acute Goals and Plan of Care (Insert Text)  Description: FUNCTIONAL STATUS PRIOR TO ADMISSION: Lives with her mother, who is able to care for herself, but can not take care of the patient. History of frequent falls due to her R knee \"giving out\". Sleeping on the sofa. She indicates that the can not use a RW in the house \"because there is not enough room for it. \" She reports having difficulty getting dressed; likely does not get dressed every day. HOME SUPPORT: Mother    Occupational Therapy Goals  2/4/2022:  Goals were not achieved. All Goals remain appropriate to continue for 7 days,. Initiated 1/29/2022  1. Patient will perform grooming standing up to 2 minutes with contact guard assist within 7 day(s). 2.  Patient will perform lower body dressing with minimal assistance using AE as needed within 7 day(s). 3.  Patient will perform toilet transfers with supervision/SBA within 7 day(s). 4.  Patient will perform all aspects of toileting with minimal assistance within 7 day(s). 5.  Patient will participate in upper extremity therapeutic exercise/activities with Min cues for 10 minutes within 7 day(s). Outcome: Progressing Towards Goal   OCCUPATIONAL THERAPY TREATMENT  Patient: Jamaal Guerra (64 y.o. female)  Date: 2/8/2022  Diagnosis: Falls [W19. XXXA]  Inability to ambulate due to multiple joints [R26.2]  Cervical radiculopathy [M54.12]  Right knee pain [M25.561] <principal problem not specified>  Procedure(s) (LRB):  C3-4, C4-5, C5-6 ANTERIOR CERVICAL DISCECTOMY AND  FUSION  (N/A) 5 Days Post-Op  Precautions: Fall  Chart, occupational therapy assessment, plan of care, and goals were reviewed. ASSESSMENT  Patient continues with skilled OT services and is slowly progressing towards goals. Pt was able to perform bed mobility with assistance and close S for sitting unsupported at EOB.   Pt is unable to reach to her feet and continues to need max A for lower body adls and toileting. Pt states that she is incontinent. Assist X2 to 3 to lateral transfer to drop arm recliner chair, and pt able to stand with assist X2 to 3/R knee blocked-while linens were adjusted. Pt performed hand washing while seated in the chair--her hands and nails visibly soiled and will need further work on this. Pt demonstrated decreased fine motor control for managing cleaning nails this session and needed up to max A for hand grooming. Pt was provided theraputty and educated in using--did not demonstrate, as she was eating her meal (needs set up) and will need follow up. Pt will benefit from rehab and continues to make steady functional gains. Current Level of Function Impacting Discharge (ADLs): up to assist X3 for lateral transfer, set up for meals and up to max A for grooming task this date    Other factors to consider for discharge: states she hasn't walked in 2 weeks         PLAN :  Patient continues to benefit from skilled intervention to address the above impairments. Continue treatment per established plan of care to address goals. Recommend with staff: encourage OOB to chair and participation in grooming activities. Recommend next OT session: follow up/reinforce theraputty    Recommendation for discharge: (in order for the patient to meet his/her long term goals)  Therapy 3 hours per day 5-7 days per week    This discharge recommendation:  Has not yet been discussed the attending provider and/or case management    IF patient discharges home will need the following DME: pt unable/not safe for d/c home at this time       SUBJECTIVE:   Patient stated I wish I could just cut up anything and eat it.     OBJECTIVE DATA SUMMARY:   Cognitive/Behavioral Status:  Neurologic State: Alert  Orientation Level: Oriented X4  Cognition: Follows commands  Perception: Appears intact  Perseveration:  (very talkative)  Safety/Judgement: Decreased awareness of need for safety    Functional Mobility and Transfers for ADLs:  Bed Mobility:  Rolling: Moderate assistance  Supine to Sit: Moderate assistance;Maximum assistance  Scooting: Moderate assistance    Transfers:  Sit to Stand: Moderate assistance (x3)     Bed to Chair: Moderate assistance; Additional time (x3)    Balance:  Sitting - Static: Fair (occasional)  Sitting - Dynamic: Fair (occasional)  Standing - Static: Fair;Constant support  Standing - Dynamic : Not tested    ADL Intervention:  Feeding  Feeding Assistance: Set-up; Supervision  Container Management: Maximum assistance  Cutting Food: Maximum assistance  Utensil Management: Independent  Food to Mouth: Independent  Drink to Mouth: Minimum assistance (needs placement within reach)  Cues: Physical assistance    Grooming  Grooming Assistance: Set-up; Maximum assistance (MaxA for hand care)  Position Performed: Seated in chair  Washing Hands: Maximum assistance (to min A using wash cloth )  Adaptive Equipment:  (needs to soak hands--physicallysoiled nails)                   Lower Body Dressing Assistance  Socks: Total assistance (dependent) (may benefit from use of sockaide, but handskills are limited)  Position Performed: Seated edge of bed         Cognitive Retraining  Maintains Attention For (Time):  (limited, changes topics frequently and is verbose)  Following Commands: Follows one step commands/directions  Safety/Judgement: Decreased awareness of need for safety    Therapeutic Exercises:   Encouraged  B use of  hands for grooming and attempting to open pkgs on food tray.     Encouraged B hand sensory based activity- varied textures   Educated in using red theraputty---roll pinch etc    Pain:  No complaints    After treatment patient left in no apparent distress:   Sitting in chair, Call bell within reach, Bed / chair alarm activated, and set up with meal    COMMUNICATION/COLLABORATION:   The patients plan of care was discussed with: Physical therapist and Registered nurse.     Allyson Archer, OTR/L  Time Calculation: 55 mins

## 2022-02-08 NOTE — PROGRESS NOTES
Ortho / Neurosurgery NP Note    POD# 5 s/p C3-4, C4-5, C5-6 ANTERIOR CERVICAL DISCECTOMY AND  FUSION    Pt seen with no visitor present. Pt resting in bed, in NAD. Awake and alert, watching TV. Reports minimal post-op pain, using little oxycodone. State UE strength improved and numbness in rom hands has resolved, intermittent tingling   Reports RLE numbness from knee to foot is unchanged from prior to surgery, denies tingling   Reports knee pain over patella unchanged from admission, likely related to PTA injury    Continues to have issues with hard and soft c-collars \"too uncomfortable\". Refusing to wear collar while in bed, states she will wear with any activity including bed positioning. Continue to explain reasoning for collar      Continues to touch around surgical dressing; nails filled with dirt. Explained risk of infection. Area cleaned thoroughly with chg and covered with optifoam yesterday     States swallowing and sore throat improving but still having trouble with solids. Feels food is getting stuck but able to clear by drinking fluids. Sitting straight up in bed with meals and eating slowly. Strong voice unchanged from pre-op. Current smoker. Denies nausea. Multiple BMs since surgery. Tolerating ensures. VSS Afebrile. Room air.      Visit Vitals  BP (!) 118/58   Pulse 90   Temp 98.3 °F (36.8 °C)   Resp 18   Ht 5' 2\" (1.575 m)   Wt 136.1 kg (300 lb)   SpO2 93%   BMI 54.87 kg/m²       Voiding status: + void  Output (mL)  Urine Voided: 750 ml (02/01/22 0807)  Last Bowel Movement Date: 02/07/22 (02/07/22 0900)  Unmeasurable Output  Urine Occurrence(s): 1 (02/05/22 2221)  Stool Occurrence(s): 1 (02/07/22 0922)      Labs    Lab Results   Component Value Date/Time    HGB 10.6 (L) 02/06/2022 04:10 AM      Lab Results   Component Value Date/Time    INR 1.0 02/02/2022 10:22 AM      Lab Results   Component Value Date/Time    Sodium 135 (L) 02/04/2022 04:26 AM    Potassium 4.0 02/04/2022 04:26 AM    Chloride 105 02/04/2022 04:26 AM    CO2 25 02/04/2022 04:26 AM    Glucose 94 02/04/2022 04:26 AM    BUN 12 02/04/2022 04:26 AM    Creatinine 0.80 02/04/2022 04:26 AM    Calcium 8.4 (L) 02/04/2022 04:26 AM     Recent Glucose Results:   No results found for: GLU, GLUPOC, GLUCPOC    Body mass index is 54.87 kg/m². : A BMI > 30 is classified as obesity and > 40 is classified as morbid obesity. Incision check 2/7 - no drainage or swelling noted, mild erythema directly at incision line. Optifoam applied 2/7 to protect incision - c.d.i. HV Drain removed 2/4  Calves soft and supple; No pain with passive stretch  Sensation and motor intact - BUE  4+/5, deltoids/biceps 4+/5  LLE PF/DF 4+/5. RLE minimal PF/DF. Negative EHL. Left hip flexor 4-/5. Right hip flexor 2/5  Knee with scantbruise over patella. Lovenox for DVT proph     PLAN:  1) PT/OT - Continue to encourage c-collar   2) Knee pain - continue voltaren gel. Dr. Melanie Ferrera would like to avoid restarting Meloxicam /NSAIDs to avoid complications with surgical fusion. 3) Pain control - scheduled tylenol, prn oxycodone. Chloraseptic for sore throat. Bowel regimen changed to prn given multiple soft stools and little use of oxy. 4) RLE numbness and BUE tingling - discussed symptoms with Dr Melanie Ferrera, will start on gabapentin 300 mg TID. Tolerating dosing well. 5) Smoking cessation - patient continues to ask to go outside to smoke, declined nicotine patch. Reviewed and aware of risk for decreased healing and increase risk of infection. 6) Dysphagia - Consult Speech Therapy today. 7) Discharge planning - patient is a great candidate for IP Rehab to regain her recent loss of function due to severe spinal stenosis.        Kobe Sutherland, SANTHOSH

## 2022-02-08 NOTE — PROGRESS NOTES
POD 5  afeb VSS  Working with PT and OT  Appreciate SLP evalaution  Incision healing well  Continue gabapentin  Continue cervical collar for 6 weeks post-op. Follow up with  me 2 weeks after discharge from rehab.

## 2022-02-08 NOTE — PROGRESS NOTES
TRANSFER - OUT REPORT:    Verbal report given to GERMÁN Ty(name) on Ragini A M Micky  being transferred to Neuro(unit) for routine progression of care       Report consisted of patients Situation, Background, Assessment and   Recommendations(SBAR). Information from the following report(s) SBAR, Kardex, Intake/Output, MAR and Recent Results was reviewed with the receiving nurse. Lines:   Peripheral IV 01/29/22 Left Antecubital (Active)   Site Assessment Clean, dry, & intact 02/08/22 0800   Phlebitis Assessment 0 02/08/22 0800   Infiltration Assessment 0 02/08/22 0800   Dressing Status Clean, dry, & intact 02/08/22 0800   Dressing Type Transparent;Tape 02/08/22 0800   Hub Color/Line Status Patent 02/08/22 0800   Action Taken Open ports on tubing capped 02/08/22 0800   Alcohol Cap Used Yes 02/08/22 0800       Peripheral IV 02/03/22 Anterior;Distal;Right Forearm (Active)   Site Assessment Clean, dry, & intact 02/08/22 0800   Phlebitis Assessment 0 02/08/22 0800   Infiltration Assessment 0 02/08/22 0800   Dressing Status Clean, dry, & intact 02/08/22 0800   Dressing Type Transparent;Tape 02/08/22 0800   Hub Color/Line Status Patent 02/08/22 0800   Action Taken Open ports on tubing capped 02/08/22 0800   Alcohol Cap Used Yes 02/08/22 0800        Opportunity for questions and clarification was provided.       Patient transported with:   Registered Nurse

## 2022-02-08 NOTE — PROGRESS NOTES
SPEECH PATHOLOGY BEDSIDE SWALLOW EVALUATION/DISCHARGE  Patient: Natalie Saleem (57 y.o. female)  Date: 2/8/2022  Primary Diagnosis: Falls [W19. XXXA]  Inability to ambulate due to multiple joints [R26.2]  Cervical radiculopathy [M54.12]  Right knee pain [M25.561]  Procedure(s) (LRB):  C3-4, C4-5, C5-6 ANTERIOR CERVICAL DISCECTOMY AND  FUSION  (N/A) 5 Days Post-Op   Precautions:   Fall    ASSESSMENT :  Based on the objective data described below, the patient presents with functional swallow of thins, purees and soft solids. Oral phase is wnl but suspect piecemeal swallow. Tolerates purees and solids with use of liquids to wash the solids down. She coughed occasionally after large sips of thins. Smaller sips tolerated well. Low risk for aspiration and suspect her dysphagia is related to pharyngeal edema. Has she been treated for edema? Dysphagia is most likely going to improve. She does complain of acid reflux occasionally. Burping noted today. May need meds for reflux. Skilled acute therapy provided by a speech-language pathologist is not indicated at this time. PLAN :  Recommendations:  Recommend bite size food with thin liquid wash. Needs to talk small bites and sips and alternate textures to wash the solids down. She is doing this most of the time due to feeling residue on the R side of her throat. Discharge Recommendations: None     SUBJECTIVE:   Patient stated Look. Juan Winder Juan Winder  \"OK. Juan Winder Juan Winder \" very talkative.  .    OBJECTIVE:     Past Medical History:   Diagnosis Date    Arthritis     Hypothyroidism      Past Surgical History:   Procedure Laterality Date    HX ANKLE FRACTURE TX Left     ORIF     Prior Level of Function/Home Situation:  Home Situation  Home Environment: Private residence  # Steps to Enter: 4  Rails to Enter: Yes  Hand Rails : Bilateral (unstable)  One/Two Story Residence: One story  Living Alone: No  Support Systems: Parent(s)  Patient Expects to be Discharged to[de-identified] Rehab hospital/unit acute  Current DME Used/Available at Home: Crutches,Walker, rolling  Tub or Shower Type: Tub/Shower combination  Diet prior to admission:   Current Diet:    Cognitive and Communication Status:  Neurologic State: Alert,Appropriate for age  Orientation Level: Oriented X4  Cognition: Appropriate decision making,Appropriate for age attention/concentration,Appropriate safety awareness,Follows commands  Perception: Appears intact  Perseveration: No perseveration noted  Safety/Judgement: Decreased awareness of need for safety  Oral Assessment:  Oral Assessment  Labial: No impairment  Dentition: Full  Lingual: No impairment  Mandible: No impairment  P.O. Trials:  Patient Position: upright in bed  Vocal quality prior to P.O.: No impairment  Consistency Presented: Thin liquid;Puree;Mechanical soft  How Presented: Self-fed/presented;Spoon     Bolus Acceptance: No impairment  Bolus Formation/Control: No impairment     Propulsion: No impairment  Oral Residue: None  Initiation of Swallow: No impairment  Laryngeal Elevation: Functional     Pharyngeal Phase Characteristics: Double swallow; Suspected pharyngeal residue (suspect piecemeal swallow)  Effective Modifications: None  Cues for Modifications: None       Oral Phase Severity: No impairment  Pharyngeal Phase Severity : Mild  NOMS:   The NOMS functional outcome measure was used to quantify this patient's level of swallowing impairment. Based on the NOMS, the patient was determined to be at level 5 for swallow function     NOMS Swallowing Levels:  Level 1 (CN): NPO  Level 2 (CM): NPO but takes consistency in therapy  Level 3 (CL): Takes less than 50% of nutrition p.o. and continues with nonoral feedings; and/or safe with mod cues; and/or max diet restriction  Level 4 (CK):  Safe swallow but needs mod cues; and/or mod diet restriction; and/or still requires some nonoral feeding/supplements  Level 5 (CJ): Safe swallow with min diet restriction; and/or needs min cues  Level 6 (CI): Independent with p.o.; rare cues; usually self cues; may need to avoid some foods or needs extra time  Level 7 [facility]): Independent for all p.o.  RAJESH. (2003). National Outcomes Measurement System (NOMS): Adult Speech-Language Pathology User's Guide. Pain:  Pain Scale 1: Numeric (0 - 10)  Pain Intensity 1: 3     After treatment:   Patient left in no apparent distress in bed    COMMUNICATION/EDUCATION:   Patient was educated to take small bites and sips. Alternate liquids after solids. The patient's plan of care including recommendations, planned interventions, and recommended diet changes were discussed with: Registered nurse.      Thank you for this referral.  JANELLE Tijerina  Time Calculation: 15 mins

## 2022-02-08 NOTE — PROGRESS NOTES
Problem: Mobility Impaired (Adult and Pediatric)  Goal: *Acute Goals and Plan of Care (Insert Text)  Description: FUNCTIONAL STATUS PRIOR TO ADMISSION: recurrent falls due to R knee buckling; has difficulty getting off sofa where she sleeps; reports home is too cluttered to use a RW. HOME SUPPORT PRIOR TO ADMISSION: The patient lived with mother in 1 story home with 4 steps to enter and unstable rails. Mother assists patient with ADLs and meals. Physical Therapy Goals  Initiated 1/29/2022. Re-Eval 2/4/2022 due to ACDF, goals remain appropriate. 1.  Patient will move from supine to sit and sit to supine , scoot up and down, and roll side to side in bed with supervision/set-up within 7 day(s). 2.  Patient will transfer from bed to chair and chair to bed with minimal assistance/contact guard assist using the least restrictive device within 7 day(s). 3.  Patient will perform sit to stand with minimal assistance/contact guard assist within 7 day(s). 4.  Patient will ambulate with minimal assistance/contact guard assist for 35 feet with the least restrictive device within 7 day(s). 5.  Patient will ascend/descend 4 stairs with 1 handrail(s) with minimal assistance/contact guard assist within 7 day(s). Outcome: Progressing Towards Goal   PHYSICAL THERAPY TREATMENT  Patient: Ronald Ortiz (30 y.o. female)  Date: 2/8/2022  Diagnosis: Falls [W19. XXXA]  Inability to ambulate due to multiple joints [R26.2]  Cervical radiculopathy [M54.12]  Right knee pain [M25.561] <principal problem not specified>  Procedure(s) (LRB):  C3-4, C4-5, C5-6 ANTERIOR CERVICAL DISCECTOMY AND  FUSION  (N/A) 5 Days Post-Op  Precautions: Fall  Chart, physical therapy assessment, plan of care and goals were reviewed. ASSESSMENT  Patient continues with skilled PT services and is progressing towards goals. Patient presents with decreased strength and endurance. Pt performed bed mobility at mod A for LEs and additional time.  Pt required verbal cues for sequencing. Pt able to sit EOB supported for several minutes. Pt able to britta cervical collar with min A. Pt performed lateral transfer to chair at mod A x3 and additional time. Pt with improved ability using UE's to scoot. Pt performed sit to stand transfer at mod/max A x3. Pt able to stand within RW for ~20 seconds. Pt with no RLE buckling during stand. Did not attempt taking steps due to increased weakness. Pt required constant redirection back to task. Pt required max A x2 to reposition in chair. Pt left in chair with all needs met. Pt with improved mobility tolerance. Current Level of Function Impacting Discharge (mobility/balance): bed mobility mod A, lateral transfer mod A x3, sit to stand transfer mod/max x3     Other factors to consider for discharge: decreased strength and endurance, lives alone         PLAN :  Patient continues to benefit from skilled intervention to address the above impairments. Continue treatment per established plan of care. to address goals. Recommendation for discharge: (in order for the patient to meet his/her long term goals)  Therapy 3 hours per day 5-7 days per week    This discharge recommendation:  Has been made in collaboration with the attending provider and/or case management    IF patient discharges home will need the following DME: to be determined (TBD)       SUBJECTIVE:   Patient stated Qiana Ford had to have 5 EMS pick me up. It was not fun.     OBJECTIVE DATA SUMMARY:   Critical Behavior:  Neurologic State: (P) Alert  Orientation Level: Oriented X4  Cognition: (P) Follows commands  Safety/Judgement: (P) Decreased awareness of need for safety  Functional Mobility Training:  Bed Mobility:  Rolling: Moderate assistance  Supine to Sit: Moderate assistance;Maximum assistance     Scooting: Moderate assistance        Transfers:  Sit to Stand:  Moderate assistance (x3)  Stand to Sit: Moderate assistance (x3)        Bed to Chair: Moderate assistance; Additional time (x3)                    Balance:  Sitting - Static: Fair (occasional)  Sitting - Dynamic: Fair (occasional)  Standing - Static: Fair;Constant support  Standing - Dynamic : Not tested      Pain Rating:  Pt reported no new complaints of pain. Activity Tolerance:   Fair and requires rest breaks    After treatment patient left in no apparent distress:   Sitting in chair and Call bell within reach    COMMUNICATION/COLLABORATION:   The patients plan of care was discussed with: Registered nurse.      Yasir Ovalles, SPT   Time Calculation: 32 mins

## 2022-02-08 NOTE — PROGRESS NOTES
End of Shift Note    Bedside shift change report given to , RN (oncoming nurse) by Caleb Laboy LPN (offgoing nurse). Report included the following information SBAR, Kardex, Procedure Summary, Intake/Output, MAR and Recent Results    Shift worked: night     Shift summary and any significant changes:     Patient agreed to keep neck brace on most of the day yesterday, refused it while she slept. Pt complains of no pain. Patient on speciality bed. Concerns for physician to address:  Placement     Zone phone for oncoming shift:   [phone]       Activity:  Activity Level: Bed Rest  Number times ambulated in hallways past shift: 0  Number of times OOB to chair past shift: 1    Cardiac:   Cardiac Monitoring: No      Cardiac Rhythm: Sinus Rhythm    Access:   Current line(s): PIV     Genitourinary:   Urinary status: voiding and incontinent    Respiratory:   O2 Device: None (Room air)  Chronic home O2 use?: NO  Incentive spirometer at bedside: YES  Actual Volume (ml): 800 ml  GI:  Last Bowel Movement Date: 02/07/22  Current diet:  ADULT DIET Full Liquid  DIET ONE TIME MESSAGE  ADULT ORAL NUTRITION SUPPLEMENT Breakfast, Lunch, Dinner; Low Calorie/High Protein  DIET ONE TIME MESSAGE  DIET ONE TIME MESSAGE  Passing flatus: YES  Tolerating current diet: YES       Pain Management:   Patient states pain is manageable on current regimen: YES    Skin:  Tru Score: 11  Interventions: float heels, increase time out of bed, foam dressing, PT/OT consult, limit briefs and nutritional support     Patient Safety:  Fall Score:  Total Score: 4  Interventions: bed/chair alarm, assistive device (walker, cane, etc), gripper socks, pt to call before getting OOB and stay with me (per policy)  High Fall Risk: Yes    Length of Stay:  Expected LOS: 2d 12h  Actual LOS: [address] [facility] [address]

## 2022-02-08 NOTE — PROGRESS NOTES
Problem: Pressure Injury - Risk of  Goal: *Prevention of pressure injury  Description: Document Tru Scale and appropriate interventions in the flowsheet. Outcome: Progressing Towards Goal  Note: Pressure Injury Interventions:  Sensory Interventions: Assess changes in LOC    Moisture Interventions: Minimize layers    Activity Interventions: Increase time out of bed    Mobility Interventions: Float heels    Nutrition Interventions: Document food/fluid/supplement intake    Friction and Shear Interventions: Minimize layers                Problem: Patient Education: Go to Patient Education Activity  Goal: Patient/Family Education  Outcome: Progressing Towards Goal     Problem: Falls - Risk of  Goal: *Absence of Falls  Description: Document Leslee Fall Risk and appropriate interventions in the flowsheet.   Outcome: Progressing Towards Goal  Note: Fall Risk Interventions:  Mobility Interventions: Communicate number of staff needed for ambulation/transfer    Mentation Interventions: Adequate sleep, hydration, pain control    Medication Interventions: Patient to call before getting OOB,Teach patient to arise slowly    Elimination Interventions: Call light in reach    History of Falls Interventions: Consult care management for discharge planning         Problem: Patient Education: Go to Patient Education Activity  Goal: Patient/Family Education  Outcome: Progressing Towards Goal     Problem: Patient Education: Go to Patient Education Activity  Goal: Patient/Family Education  Outcome: Progressing Towards Goal     Problem: Patient Education: Go to Patient Education Activity  Goal: Patient/Family Education  Outcome: Progressing Towards Goal     Problem: Pain  Goal: *Control of Pain  Outcome: Progressing Towards Goal     Problem: Surgical Pathway Post-Op Day 2 through Discharge  Goal: Nutrition/Diet  Outcome: Progressing Towards Goal  Goal: Discharge Planning  Outcome: Progressing Towards Goal  Goal: Respiratory  Outcome: Progressing Towards Goal  Goal: Treatments/Interventions/Procedures  Outcome: Progressing Towards Goal  Goal: Psychosocial  Outcome: Progressing Towards Goal  Goal: *No signs and symptoms of infection or wound complications  Outcome: Progressing Towards Goal     Problem: Patient Education: Go to Patient Education Activity  Goal: Patient/Family Education  Outcome: Progressing Towards Goal

## 2022-02-09 PROCEDURE — 2709999900 HC NON-CHARGEABLE SUPPLY

## 2022-02-09 PROCEDURE — 74011250636 HC RX REV CODE- 250/636: Performed by: STUDENT IN AN ORGANIZED HEALTH CARE EDUCATION/TRAINING PROGRAM

## 2022-02-09 PROCEDURE — 74011250637 HC RX REV CODE- 250/637: Performed by: NEUROLOGICAL SURGERY

## 2022-02-09 PROCEDURE — 74011250637 HC RX REV CODE- 250/637: Performed by: NURSE PRACTITIONER

## 2022-02-09 PROCEDURE — 97530 THERAPEUTIC ACTIVITIES: CPT

## 2022-02-09 PROCEDURE — 97530 THERAPEUTIC ACTIVITIES: CPT | Performed by: OCCUPATIONAL THERAPIST

## 2022-02-09 PROCEDURE — 65270000029 HC RM PRIVATE

## 2022-02-09 PROCEDURE — 74011000250 HC RX REV CODE- 250: Performed by: NEUROLOGICAL SURGERY

## 2022-02-09 RX ADMIN — ENOXAPARIN SODIUM 30 MG: 100 INJECTION SUBCUTANEOUS at 21:10

## 2022-02-09 RX ADMIN — DICLOFENAC SODIUM 2 G: 10 GEL TOPICAL at 10:16

## 2022-02-09 RX ADMIN — LEVOTHYROXINE SODIUM 50 MCG: 0.05 TABLET ORAL at 05:27

## 2022-02-09 RX ADMIN — DICLOFENAC SODIUM 2 G: 10 GEL TOPICAL at 17:41

## 2022-02-09 RX ADMIN — DICLOFENAC SODIUM 2 G: 10 GEL TOPICAL at 12:22

## 2022-02-09 RX ADMIN — ACETAMINOPHEN 650 MG: 325 TABLET ORAL at 17:41

## 2022-02-09 RX ADMIN — GABAPENTIN 300 MG: 300 CAPSULE ORAL at 16:34

## 2022-02-09 RX ADMIN — ACETAMINOPHEN 650 MG: 325 TABLET ORAL at 23:23

## 2022-02-09 RX ADMIN — ACETAMINOPHEN 650 MG: 325 TABLET ORAL at 12:22

## 2022-02-09 RX ADMIN — Medication: at 10:17

## 2022-02-09 RX ADMIN — Medication: at 17:41

## 2022-02-09 RX ADMIN — SODIUM CHLORIDE, PRESERVATIVE FREE 10 ML: 5 INJECTION INTRAVENOUS at 05:27

## 2022-02-09 RX ADMIN — SODIUM CHLORIDE, PRESERVATIVE FREE 10 ML: 5 INJECTION INTRAVENOUS at 14:06

## 2022-02-09 RX ADMIN — ACETAMINOPHEN 650 MG: 325 TABLET ORAL at 05:27

## 2022-02-09 RX ADMIN — ENOXAPARIN SODIUM 30 MG: 100 INJECTION SUBCUTANEOUS at 10:16

## 2022-02-09 RX ADMIN — GABAPENTIN 300 MG: 300 CAPSULE ORAL at 21:10

## 2022-02-09 RX ADMIN — SODIUM CHLORIDE, PRESERVATIVE FREE 10 ML: 5 INJECTION INTRAVENOUS at 21:09

## 2022-02-09 RX ADMIN — DICLOFENAC SODIUM 2 G: 10 GEL TOPICAL at 21:11

## 2022-02-09 RX ADMIN — Medication: at 21:11

## 2022-02-09 RX ADMIN — Medication 1 AMPULE: at 09:00

## 2022-02-09 RX ADMIN — Medication 1 AMPULE: at 21:19

## 2022-02-09 RX ADMIN — PANTOPRAZOLE SODIUM 40 MG: 40 TABLET, DELAYED RELEASE ORAL at 10:16

## 2022-02-09 RX ADMIN — GABAPENTIN 300 MG: 300 CAPSULE ORAL at 10:16

## 2022-02-09 NOTE — PROGRESS NOTES
Problem: Falls - Risk of  Goal: *Absence of Falls  Description: Document Bisi Fothergill Fall Risk and appropriate interventions in the flowsheet.   Note: Fall Risk Interventions:  Mobility Interventions: Bed/chair exit alarm,Patient to call before getting OOB    Mentation Interventions: Adequate sleep, hydration, pain control,Bed/chair exit alarm,Door open when patient unattended,More frequent rounding    Medication Interventions: Bed/chair exit alarm,Patient to call before getting OOB    Elimination Interventions: Bed/chair exit alarm,Call light in reach    History of Falls Interventions: Room close to nurse's station

## 2022-02-09 NOTE — PROGRESS NOTES
Comprehensive Nutrition Assessment    Type and Reason for Visit: Reassess    Nutrition Recommendations/Plan:   Continue diet per SLP recs  Continue Ensure HP - TID   Please document % meals and supplements consumed in flowsheet I/O's under intake     Nutrition Assessment:      Chart reviewed. SLP saw pt and recommended bite sized food with thin liquid wash. Pt complaining about diet limitations, but also reports understanding to ensure safe PO. She has a good appetite otherwise, but PO intake depends on the food she receives. She is only drinking about 1 Ensure HP shake per day. We discussed increasing her intake of these to at least 2 per day until her meal intake is more consistent d/t her high protein needs not being met by the diet alone. No new nutrition concerns. Patient Vitals for the past 168 hrs:   % Diet Eaten   02/07/22 0922 76 - 100%       Estimated Daily Nutrient Needs:  Energy (kcal): 4868-7794 kcals (11-15 kcals/kg); Weight Used for Energy Requirements: Current  Protein (g): 109g (0.8g/kg); Weight Used for Protein Requirements: Current  Fluid (ml/day): 1500mL; Method Used for Fluid Requirements: 1 ml/kcal      Nutrition Related Findings:  Labs reviewed. Meds: synthroid, protonix. Trace edema. BM 2/7 watery. Wounds:    Surgical incision,Stage II,Pressure injury       Current Nutrition Therapies:  ADULT ORAL NUTRITION SUPPLEMENT Breakfast, Lunch, Dinner; Low Calorie/High Protein  ADULT DIET Dysphagia - Soft & Bite Sized; Chocolate Ensure high protein preferred    Anthropometric Measures:  · Height:  5' 2\" (157.5 cm)  · Current Body Wt:  136.1 kg (300 lb)   · Ideal Body Wt:  110 lbs:  272.7 %   · BMI Category:  Obese class 3 (BMI 40.0 or greater)       Nutrition Diagnosis:   · Increased nutrient needs related to  (habitus and wound healing) as evidenced by  (estimated protein needs >100g/day; BMI 55)  Dx continues.     Nutrition Interventions:   Food and/or Nutrient Delivery: Continue current diet,Continue oral nutrition supplement  Nutrition Education and Counseling: No recommendations at this time  Coordination of Nutrition Care: Continue to monitor while inpatient    Goals:  PO intake >70% meals and supplements next 5-7 days       Nutrition Monitoring and Evaluation:   Behavioral-Environmental Outcomes: None identified  Food/Nutrient Intake Outcomes: Diet advancement/tolerance,Food and nutrient intake,Supplement intake  Physical Signs/Symptoms Outcomes: Biochemical data,GI status,Weight,Skin    Discharge Planning:     Too soon to determine     Electronically signed by Ty Levy RD on 2/9/2022 at 2:18 PM    Contact: [facility] [id number]

## 2022-02-09 NOTE — PROGRESS NOTES
Transition of Care Plan:    RUR: 10% - \"low risk\"   Disposition: IPR - [facility] (pending bed availability)  Follow up appointments: PCP and specialist as indicated    DME needed: Defer to IPR facility   Transportation at Discharge: CM to secure medical transport for d/c; PCS to be completed & placed on chart   Keys or means to access home: N/A - pt transitioning to IPR at d/c   Medicare Letter: N/A - Medicaid coverage   Is patient a BCPI-A Bundle: N/A     Is patient a [place] and connected with the [address]? N/A               Caregiver Contact: Pt's mother Sacha Haro: [phone])  Discharge Caregiver contacted prior to discharge? To be contacted prior to d/c  Care Conference needed?: TBD    Initial note: CM reviewed pt's chart prior to moving forward with d/c planning. Per previous CM note from 2/7/22, pt was accepted to [facility]; previous CM requested for facility to initiate insurance auth 2/7/22. CM contacted [facility] admission liaison (Lin: [phone]) to receive update on the status of insurance auth & inquired about bed availability at the facility. Lin confirmed insurance has been successfully processed. Lin reported the facility is anticipating bed availability tomorrow (2/10/22) vs Friday (2/11/22). Lin reported the facility has a meeting this morning to discuss bed availability; Lin to contact CM with updates once available. CM inquired about the facility's COVID-19 testing protocol; Lin reported the pt will not require a COVID-19 test (rapid or PCR) prior to admission, as the facility conducts their own test upon admission. CM will meet with pt at bedside to introduce role, report updates regarding IPR placement, & address questions/concerns as needed. CM will continue to follow for d/c planning & report updates as they become available.     Shelva Bumpers, MSW  Care Manager, [facility]

## 2022-02-09 NOTE — PROGRESS NOTES
Problem: Self Care Deficits Care Plan (Adult)  Goal: *Acute Goals and Plan of Care (Insert Text)  Description: FUNCTIONAL STATUS PRIOR TO ADMISSION: Lives with her mother, who is able to care for herself, but can not take care of the patient. History of frequent falls due to her R knee \"giving out\". Sleeping on the sofa. She indicates that the can not use a RW in the house \"because there is not enough room for it. \" She reports having difficulty getting dressed; likely does not get dressed every day. HOME SUPPORT: Mother    Occupational Therapy Goals  2/4/2022:  Goals were not achieved. All Goals remain appropriate to continue for 7 days,. Initiated 1/29/2022  1. Patient will perform grooming standing up to 2 minutes with contact guard assist within 7 day(s). 2.  Patient will perform lower body dressing with minimal assistance using AE as needed within 7 day(s). 3.  Patient will perform toilet transfers with supervision/SBA within 7 day(s). 4.  Patient will perform all aspects of toileting with minimal assistance within 7 day(s). 5.  Patient will participate in upper extremity therapeutic exercise/activities with Min cues for 10 minutes within 7 day(s). Outcome: Progressing Towards Goal    OCCUPATIONAL THERAPY TREATMENT  Patient: Fernanda Baez (72 y.o. female)  Date: 2/9/2022  Diagnosis: Falls [W19. XXXA]  Inability to ambulate due to multiple joints [R26.2]  Cervical radiculopathy [M54.12]  Right knee pain [M25.561] <principal problem not specified>  Procedure(s) (LRB):  C3-4, C4-5, C5-6 ANTERIOR CERVICAL DISCECTOMY AND  FUSION  (N/A) 6 Days Post-Op  Precautions: Fall  Chart, occupational therapy assessment, plan of care, and goals were reviewed. ASSESSMENT  Patient continues with skilled OT services and is progressing towards goals.   Pt continues with decreased I with self care due to decreased endurance, decreased strength, and increased anxiety following admit for cervical discectomy and fusion. Pt supine in bed, cleared by RN for OOB activity. Pt needing total A to don Aspen collar in bed, refusing to wear in bed, educated on benefit of aspen. Pt indicated understanding of education, however, reports will not wear it \"because it hurts\". Pt then completed supine to sit via logroll with mod A x 2, min A and increased time for scooting to EOB. Pt then utilized [employer] for sit to stand with min A x 2. Pt demonstrating good  on Best 's handles, able to stand without support on [employer] for 5 attempts. Pt standing for approximately 20 seconds each trial.  Pt then transferred to chair with Best , min A for slow descent into chair. Pt reports feeling much more comfortable and at ease using Best  rather than therapist assist.  Feel pt will continue to benefit from skilled OT to maximize functional return. Pt will likely benefit from inpt rehab upon discharge as she is well below her baseline ADL level. Current Level of Function Impacting Discharge (ADLs): total A to don aspen    Other factors to consider for discharge: pt lives with mother who can offer physical A         PLAN :  Patient continues to benefit from skilled intervention to address the above impairments. Continue treatment per established plan of care to address goals. Recommend with staff: return to bed with Best , PCT in agreement    Recommend next OT session: LE ADLs with AE, UE ADLs    Recommendation for discharge: (in order for the patient to meet his/her long term goals)  Therapy 3 hours per day 5-7 days per week    This discharge recommendation:  Has been made in collaboration with the attending provider and/or case management    IF patient discharges home will need the following DME: TBD at inpt rehab       SUBJECTIVE:   Patient stated I'm scared of that thing (Best ).     OBJECTIVE DATA SUMMARY:   Cognitive/Behavioral Status:  Neurologic State: Alert  Orientation Level: Appropriate for age  Cognition: Follows commands (anxious)  Perception: Appears intact  Perseveration: No perseveration noted  Safety/Judgement: Awareness of environment; Fall prevention    Functional Mobility and Transfers for ADLs:  Bed Mobility:  Supine to Sit: Moderate assistance;Assist x2  Scooting: Minimum assistance    Transfers:  Sit to Stand: Minimum assistance;Assist x2 (using Best )     Bed to Chair:  (Best )    Balance:  Sitting - Static: Fair (occasional)  Sitting - Dynamic: Fair (occasional)    ADL Intervention:                      Upper Body Dressing Assistance  Dressing Assistance: Total assistance(dependent)  Orthotics(Brace): Total assistance (dependent); Compensatory technique training (pt refuses to wear Aspen in bed)              Cognitive Retraining  Safety/Judgement: Awareness of environment; Fall prevention    Therapeutic Activity:   Pt then completed supine to sit via logroll with mod A x 2, min A and increased time for scooting to EOB. Pt then utilized [employer] for sit to stand with min A x 2. Pt demonstrating good  on Best 's handles, able to stand without support on [employer] for 5 attempts. Pt standing for approximately 20 seconds each trial.  Pt then transferred to chair with Best , min A for slow descent into chair. Pt reports feeling much more comfortable and at ease using Best  rather than therapist assist.  Pt completed activity in preparation for standing ADL attempts and functional transfers. Pain:  Did not quantify pain, reports pain from [place]    Activity Tolerance:   Fair    After treatment patient left in no apparent distress:   Sitting in chair, Call bell within reach, Bed / chair alarm activated, and RN aware and OK    COMMUNICATION/COLLABORATION:   The patients plan of care was discussed with: Physical therapist and Registered nurse.      Sushma Weston OTR/L  Time Calculation: 27 mins

## 2022-02-09 NOTE — PROGRESS NOTES
Problem: Mobility Impaired (Adult and Pediatric)  Goal: *Acute Goals and Plan of Care (Insert Text)  Description: FUNCTIONAL STATUS PRIOR TO ADMISSION: recurrent falls due to R knee buckling; has difficulty getting off sofa where she sleeps; reports home is too cluttered to use a RW. HOME SUPPORT PRIOR TO ADMISSION: The patient lived with mother in 1 story home with 4 steps to enter and unstable rails. Mother assists patient with ADLs and meals. Physical Therapy Goals  Initiated 1/29/2022. Re-Eval 2/4/2022 due to ACDF, goals remain appropriate. 1.  Patient will move from supine to sit and sit to supine , scoot up and down, and roll side to side in bed with supervision/set-up within 7 day(s). 2.  Patient will transfer from bed to chair and chair to bed with minimal assistance/contact guard assist using the least restrictive device within 7 day(s). 3.  Patient will perform sit to stand with minimal assistance/contact guard assist within 7 day(s). 4.  Patient will ambulate with minimal assistance/contact guard assist for 35 feet with the least restrictive device within 7 day(s). 5.  Patient will ascend/descend 4 stairs with 1 handrail(s) with minimal assistance/contact guard assist within 7 day(s). Outcome: Progressing Towards Goal   PHYSICAL THERAPY TREATMENT  Patient: Robbi Jordan (74 y.o. female)  Date: 2/9/2022  Diagnosis: Falls [W19. XXXA]  Inability to ambulate due to multiple joints [R26.2]  Cervical radiculopathy [M54.12]  Right knee pain [M25.561] <principal problem not specified>  Procedure(s) (LRB):  C3-4, C4-5, C5-6 ANTERIOR CERVICAL DISCECTOMY AND  FUSION  (N/A) 6 Days Post-Op  Precautions: Fall  Chart, physical therapy assessment, plan of care and goals were reviewed. ASSESSMENT  Patient continues with skilled PT services and is progressing towards goals.  Patient received in bed and agreeable to participate, needed assist to don Aspen collar (still refuses to wear it when she is in bed), then demonstrated improved  ability to come sit EOB and scoot out to correct position. Used Best  to transfer to chair and patient able to practice coming fully to stand from semi upright position (pads behind for safety) x 5 reps, can sustain full stand x approx 20 seconds each time. Patient left up in chair with call bell in reach, remains well below functional baseline and is a high risk of falls, recommend IPR as she is motivated to improve and well below baseline. Current Level of Function Impacting Discharge (mobility/balance): min assist x 2 with Best  to pull to stand    Other factors to consider for discharge: high falls risk, well below baseline, non ambulatory         PLAN :  Patient continues to benefit from skilled intervention to address the above impairments. Continue treatment per established plan of care. to address goals. Recommendation for discharge: (in order for the patient to meet his/her long term goals)  Therapy 3 hours per day 5-7 days per week    This discharge recommendation:  Has been made in collaboration with the attending provider and/or case management    IF patient discharges home will need the following DME: to be determined (TBD)       SUBJECTIVE:   Patient stated I am just scared of falling.     OBJECTIVE DATA SUMMARY:   Critical Behavior:  Neurologic State: Alert  Orientation Level: Oriented X4  Cognition: Follows commands  Safety/Judgement: Decreased awareness of need for safety  Functional Mobility Training:  Bed Mobility:     Supine to Sit: Moderate assistance;Assist x2     Scooting: Minimum assistance        Transfers:  Sit to Stand: Minimum assistance;Assist x2 (using Best )  Stand to Sit: Minimum assistance;Assist x2 (from [employer]Beat)        Bed to Chair:  (Best )                    Balance:  Sitting - Static: Fair (occasional)  Sitting - Dynamic: Fair (occasional)  Ambulation/Gait Training: Stairs: Activity Tolerance:   Good    After treatment patient left in no apparent distress:   Sitting in chair, Call bell within reach, and Bed / chair alarm activated    COMMUNICATION/COLLABORATION:   The patients plan of care was discussed with: Occupational therapist and Registered nurse.      Win Rubio, PT   Time Calculation: 26 mins

## 2022-02-09 NOTE — PROGRESS NOTES
Ortho / Neurosurgery NP Note    POD# 6 s/p C3-4, C4-5, C5-6 ANTERIOR CERVICAL DISCECTOMY AND  FUSION    Pt seen with no visitor present. Pt resting in chair, in NAD. C-collar in place. Assisted back to bed with sit to stand machine    Reports minimal post-op pain, controlled with tylenol alone     No significant changes:  BUE strength improved and numbness in rom hands has resolved, intermittent tingling   Reports RLE numbness from knee to foot is unchanged from prior to surgery, denies tingling   Reports knee pain over patella unchanged from admission, likely related to PTA injury    Continues to have issues with hard and soft c-collars \"too uncomfortable\". Refusing to wear collar while in bed, states she will wear with any activity including bed positioning. Continue to explain reasoning for collar      Continues to touch around surgical dressing; nails filled with dirt. Explained risk of infection. Area cleaned thoroughly with chg and covered with optifoam on 2/7    Reports swallowing improved. Able to eat 100% of regular lunch tray with no difficulty. Strong voice unchanged from pre-op. Current smoker. Denies nausea. Multiple BMs since surgery. Tolerating ensures. VSS Afebrile. Room air.      Visit Vitals  BP (!) 123/56 (BP 1 Location: Right upper arm, BP Patient Position: Sitting)   Pulse (!) 102   Temp 98.6 °F (37 °C)   Resp 20   Ht 5' 2\" (1.575 m)   Wt 136.1 kg (300 lb)   SpO2 94%   BMI 54.87 kg/m²       Voiding status: + void  Output (mL)  Urine Voided: 750 ml (02/01/22 0807)  Last Bowel Movement Date: 02/07/22 (02/08/22 2342)  Unmeasurable Output  Urine Occurrence(s): 0 (02/08/22 1423)  Stool Occurrence(s): 0 (02/08/22 1423)  Emesis Occurrence(s): 0 (02/08/22 1423)      Labs    Lab Results   Component Value Date/Time    HGB 10.6 (L) 02/06/2022 04:10 AM      Lab Results   Component Value Date/Time    INR 1.0 02/02/2022 10:22 AM      Lab Results   Component Value Date/Time    Sodium 135 (L) 02/04/2022 04:26 AM    Potassium 4.0 02/04/2022 04:26 AM    Chloride 105 02/04/2022 04:26 AM    CO2 25 02/04/2022 04:26 AM    Glucose 94 02/04/2022 04:26 AM    BUN 12 02/04/2022 04:26 AM    Creatinine 0.80 02/04/2022 04:26 AM    Calcium 8.4 (L) 02/04/2022 04:26 AM     Recent Glucose Results:   No results found for: GLU, GLUPOC, GLUCPOC    Body mass index is 54.87 kg/m². : A BMI > 30 is classified as obesity and > 40 is classified as morbid obesity. Incision check 2/7 - no drainage or swelling noted, mild erythema directly at incision line. Optifoam applied 2/7 to protect incision - c.d.i. HV Drain removed 2/4  Calves soft and supple; No pain with passive stretch  BUE 4+/5  LLE PF/DF 4+/5. Left hip flexor 4-/5. RLE minimal PF/DF. Negative EHL. Right hip flexor 2/5  Knee with scantbruise over patella. Lovenox for DVT proph     PLAN:  1) PT/OT - Continue to encourage c-collar   2) Knee pain - continue voltaren gel. Dr. Melanie Ferrera would like to avoid restarting Meloxicam /NSAIDs to avoid complications with surgical fusion. 3) Pain control - scheduled tylenol, prn oxycodone. Chloraseptic for sore throat. Bowel regimen changed to prn given multiple soft stools and little use of oxy. 4) RLE numbness and BUE tingling - started on gabapentin 300 mg TID 2/7. Tolerating dosing well. 5) Smoking cessation - no desire to stop smoking, declined nicotine patch. Reviewed and aware of risk for decreased healing and increase risk of infection. 6) Dysphagia - seen by Speech Therapy yesterday. Continue to take small bites and alternate with liquids to help clear food. Started on protonix for c/o acid reflux  7) Discharge planning - patient is a great candidate for IP Rehab to regain her recent loss of function due to severe spinal stenosis. Accepted by [facility].       Kobe Sutherland NP

## 2022-02-09 NOTE — PROGRESS NOTES
End of Shift Note    Bedside shift change report given to [facility]  (oncoming nurse) by Katy Alejo RN (offgoing nurse). Report included the following information SBAR, Kardex, Procedure Summary, Intake/Output, MAR and Recent Results    Shift worked: Nights      Shift summary and any significant changes:     Pt incontinent while in bed states she doesn't know when she is going. Drsg to anterior neck clean dry and intact. Drsg to rt hip for large open blister. Awaiting placement. Concerns for physician to address:  none     Zone phone for oncoming shift:   [phone]       Activity:  Activity Level: Bed Rest  Number times ambulated in hallways past shift: 0  Number of times OOB to chair past shift: 1    Cardiac:   Cardiac Monitoring: No      Cardiac Rhythm: Sinus Rhythm    Access:   Current line(s): PIV     Genitourinary:   Urinary status: voiding and incontinent    Respiratory:   O2 Device: None (Room air)  Chronic home O2 use?: NO  Incentive spirometer at bedside: YES  Actual Volume (ml): 800 ml  GI:  Last Bowel Movement Date: 02/07/22  Current diet:  DIET ONE TIME MESSAGE  ADULT ORAL NUTRITION SUPPLEMENT Breakfast, Lunch, Dinner; Low Calorie/High Protein  DIET ONE TIME MESSAGE  DIET ONE TIME MESSAGE  ADULT DIET Dysphagia - Soft & Bite Sized  Passing flatus: YES  Tolerating current diet: YES       Pain Management:   Patient states pain is manageable on current regimen: YES    Skin:  Tru Score: 12  Interventions: float heels, increase time out of bed, foam dressing, PT/OT consult, limit briefs and nutritional support     Patient Safety:  Fall Score:  Total Score: 4  Interventions: bed/chair alarm, assistive device (walker, cane, etc), gripper socks, pt to call before getting OOB and stay with me (per policy)  High Fall Risk: Yes    Length of Stay:  Expected LOS: 2d 12h  Actual LOS: 9      Katy Alejo RN

## 2022-02-09 NOTE — PROGRESS NOTES
Hospitalist Progress Note    NAME: Eric Waddell   :  1980   MRN:  [id number]       Assessment / Plan:  Severe Cervical Spinal stenosis with Radiculopathy POA  Myelopathy with spinal cord compression  S/p anterior cervical discectomy decompression and fusionC3-C4, C4-C5 and C5-C6-2/3. Right knee pain POA  Recurrent falls. Physical deconditioning  History of arthritis  H/o L ankle Fracture s/p ORIF at [facility] ortho  Hypothyroidism (newly diagnosed) POA  Morbid obesity POA  Tobacco abuse     MRI C Spine=  1. Disc protrusions C4-C5 and C5-C6, with severe canal stenosis and cord  flattening. 2. Probable abnormal cord signal C4-C5. Possible abnormal cord signal C5-C6. 3. Moderate canal stenosis C3-C4. 4. Neural foraminal stenoses: bilateral C3-C4, right and severe left C4-C5, left  C5-C6.     Pt was under observation stay- changed to IP  after 2 [place] stay - failed outpatient management/treatment  Cont PO oxycodone prn severe pain only- avoid as able  Cont falls precautions  IP PT OT consult noted-- recommends IPR  IP  consulted- working on [employer] placement  IP Ortho consult appreciated-   - Generalized bony knee pain due to overload - NO KNEE intervention recommended  -  Wt loss through dietary changes   - Rehab for upper and lower body strengthening  Neuro eval for foot drop appreciated. Neurosurgery on boards/p S/p anterior cervical discectomy decompression and fusionC3-C4, C4-C5 and C5-C6-2/3. Dysphagia:  Post operative  Neurosurgery mentions that this is not unusal for patients to have some difficulty swallowing especially after undergoing multilevel discectomy  Mentions its getting better. Dysphagia improving  Xray C spine:  Recent anterior cervical fusion with prevertebral soft tissue  swelling as above. SLP consulted.     Hypothyroidism:    TSH = 11.3, started on Synthroid daily, Outpatient TSH as outpatient in 4 weeks for further dose titration     Active smoker:  Counselled on cessation  Patient declined nicotine patch.     Dispositionpatient medically stable for discharge, PT recommended rehab     Code Status: Full code  Surrogate Decision Maker: Mother 79 yrs old at home who cant help physically     DVT Prophylaxis: Lovenox  GI Prophylaxis: not indicated     Baseline: Ambulatory     Estimated discharge date: Medically ready  Barriers: Dispositioninpatient rehab     Recommended Disposition: [facility]H/Rehab     Subjective:     Chief Complaint / Reason for Physician Visit  Follow-up cervical stenosis status post anterior cervical discectomy decompression  Complain of pain on the surgical site, dysphagia seems to be improving  She is eager to go to rehab    Review of Systems:  Symptom Y/N Comments  Symptom Y/N Comments   Fever/Chills n   Chest Pain n    Poor Appetite n   Edema n    Cough    Abdominal Pain n    Sputum    Joint Pain     SOB/BECKETT    Pruritis/Rash     Nausea/vomit    Tolerating PT/OT     Diarrhea    Tolerating Diet     Constipation    Other       Could NOT obtain due to:      Objective:     VITALS:   Last 24hrs VS reviewed since prior progress note. Most recent are:  Patient Vitals for the past 24 hrs:   Temp Pulse Resp BP SpO2   02/09/22 1134 98.6 °F (37 °C) (!) 102 20 (!) 123/56 94 %   02/09/22 0700 98.4 °F (36.9 °C) (!) 107 20 (!) 120/59 92 %   02/09/22 0316 97.7 °F (36.5 °C) (!) 108 18 124/71 90 %   02/08/22 2342 98.5 °F (36.9 °C) 96 18 120/73 94 %   02/08/22 2011 98.5 °F (36.9 °C) 92 18 (!) 121/59 92 %   02/08/22 1423 98.2 °F (36.8 °C) 87 18 120/74 97 %       Intake/Output Summary (Last 24 hours) at 2/9/2022 1343  Last data filed at 2/8/2022 1423  Gross per 24 hour   Intake 0 ml   Output    Net 0 ml        I had a face to face encounter and independently examined this patient on 2/9/2022, as outlined below:  PHYSICAL EXAM:  General: WD, WN. Alert, cooperative, no acute distress    EENT:  EOMI. Anicteric sclerae.  MMM, Incision site at lower neck covered with dressing, is dry  Resp:  CTA bilaterally, no wheezing or rales. No accessory muscle use  CV:  Regular  rhythm,  No edema  GI:  Soft, Non distended, Non tender. +Bowel sounds  Neurologic:  Alert and oriented X 3, normal speech,   Psych:   Good insight. Not anxious nor agitated  Skin:  No rashes. No jaundice    Reviewed most current lab test results and cultures  YES  Reviewed most current radiology test results   YES  Review and summation of old records today    NO  Reviewed patient's current orders and MAR    YES  PMH/SH reviewed - no change compared to H&P  ________________________________________________________________________  Care Plan discussed with:    Comments   Patient y    Family      RN y    Care Manager     Consultant                        Multidiciplinary team rounds were held today with , nursing, pharmacist and clinical coordinator. Patient's plan of care was discussed; medications were reviewed and discharge planning was addressed. ________________________________________________________________________  Total NON critical care TIME: 39  Minutes    Total CRITICAL CARE TIME Spent:   Minutes non procedure based      Comments   >50% of visit spent in counseling and coordination of care     ________________________________________________________________________  Michael Nelson MD     Procedures: see electronic medical records for all procedures/Xrays and details which were not copied into this note but were reviewed prior to creation of Plan. LABS:  I reviewed today's most current labs and imaging studies. Pertinent labs include:  No results for input(s): WBC, HGB, HCT, PLT, HGBEXT, HCTEXT, PLTEXT, HGBEXT, HCTEXT, PLTEXT in the last 72 hours. No results for input(s): NA, K, CL, CO2, GLU, BUN, CREA, CA, MG, PHOS, ALB, TBIL, TBILI, ALT, INR, INREXT, INREXT in the last 72 hours.     No lab exists for component: SGOT    Signed: Michael Nelson MD

## 2022-02-09 NOTE — PROGRESS NOTES
End of Shift Note     Bedside shift change report given to [facility]  (oncoming nurse) by Tresa Mondragon RN (offgoing nurse). Report included the following information SBAR, Kardex, Procedure Summary, Intake/Output, MAR and Recent Results     Shift worked: 3pm-7pm       Shift summary and any significant changes:     Tolerates her diet,crush pills      Concerns for physician to address:  none      Zone phone for oncoming shift:   [phone]         Activity:  Activity Level: Bed Rest  Number times ambulated in hallways past shift: 0  Number of times OOB to chair past shift: 1     Cardiac:   Cardiac Monitoring: No      Cardiac Rhythm: Sinus Rhythm     Access:   Current line(s): PIV      Genitourinary:   Urinary status: voiding and incontinent     Respiratory:   O2 Device: None (Room air)  Chronic home O2 use?: NO  Incentive spirometer at bedside: YES  Actual Volume (ml): 800 ml  GI:  Last Bowel Movement Date: 02/07/22  Current diet:  DIET ONE TIME MESSAGE  ADULT ORAL NUTRITION SUPPLEMENT Breakfast, Lunch, Dinner; Low Calorie/High Protein  DIET ONE TIME MESSAGE  DIET ONE TIME MESSAGE  ADULT DIET Dysphagia - Soft & Bite Sized  Passing flatus: YES  Tolerating current diet: YES     Pain Management:   Patient states pain is manageable on current regimen: YES     Skin:  Tru Score: 12  Interventions: float heels, increase time out of bed, foam dressing, PT/OT consult, limit briefs and nutritional support     Patient Safety:  Fall Score: Total Score: 4  Interventions: bed/chair alarm, assistive device (walker, cane, etc), gripper socks, pt to call before getting OOB and stay with me (per policy)  High Fall Risk:  Yes     Length of Stay:  Expected LOS: 2d 12h  Actual LOS: 41 Reta Kapoor RN

## 2022-02-10 PROCEDURE — 94760 N-INVAS EAR/PLS OXIMETRY 1: CPT

## 2022-02-10 PROCEDURE — 74011250637 HC RX REV CODE- 250/637: Performed by: NEUROLOGICAL SURGERY

## 2022-02-10 PROCEDURE — 97535 SELF CARE MNGMENT TRAINING: CPT

## 2022-02-10 PROCEDURE — 97530 THERAPEUTIC ACTIVITIES: CPT

## 2022-02-10 PROCEDURE — 74011250637 HC RX REV CODE- 250/637: Performed by: NURSE PRACTITIONER

## 2022-02-10 PROCEDURE — 65270000029 HC RM PRIVATE

## 2022-02-10 PROCEDURE — 74011000250 HC RX REV CODE- 250: Performed by: NEUROLOGICAL SURGERY

## 2022-02-10 PROCEDURE — 74011250637 HC RX REV CODE- 250/637: Performed by: STUDENT IN AN ORGANIZED HEALTH CARE EDUCATION/TRAINING PROGRAM

## 2022-02-10 PROCEDURE — 74011250636 HC RX REV CODE- 250/636: Performed by: STUDENT IN AN ORGANIZED HEALTH CARE EDUCATION/TRAINING PROGRAM

## 2022-02-10 RX ADMIN — Medication: at 09:00

## 2022-02-10 RX ADMIN — DICLOFENAC SODIUM 2 G: 10 GEL TOPICAL at 21:10

## 2022-02-10 RX ADMIN — ENOXAPARIN SODIUM 30 MG: 100 INJECTION SUBCUTANEOUS at 21:09

## 2022-02-10 RX ADMIN — SODIUM CHLORIDE, PRESERVATIVE FREE 10 ML: 5 INJECTION INTRAVENOUS at 16:02

## 2022-02-10 RX ADMIN — SODIUM CHLORIDE, PRESERVATIVE FREE 10 ML: 5 INJECTION INTRAVENOUS at 21:11

## 2022-02-10 RX ADMIN — GABAPENTIN 300 MG: 300 CAPSULE ORAL at 21:09

## 2022-02-10 RX ADMIN — ACETAMINOPHEN 650 MG: 325 TABLET ORAL at 17:37

## 2022-02-10 RX ADMIN — Medication 1 AMPULE: at 21:09

## 2022-02-10 RX ADMIN — GABAPENTIN 300 MG: 300 CAPSULE ORAL at 16:01

## 2022-02-10 RX ADMIN — ACETAMINOPHEN 650 MG: 325 TABLET ORAL at 23:55

## 2022-02-10 RX ADMIN — PANTOPRAZOLE SODIUM 40 MG: 40 TABLET, DELAYED RELEASE ORAL at 08:18

## 2022-02-10 RX ADMIN — GABAPENTIN 300 MG: 300 CAPSULE ORAL at 08:18

## 2022-02-10 RX ADMIN — DICLOFENAC SODIUM 2 G: 10 GEL TOPICAL at 13:38

## 2022-02-10 RX ADMIN — LEVOTHYROXINE SODIUM 50 MCG: 0.05 TABLET ORAL at 05:06

## 2022-02-10 RX ADMIN — ACETAMINOPHEN 650 MG: 325 TABLET ORAL at 05:07

## 2022-02-10 RX ADMIN — ACETAMINOPHEN 650 MG: 325 TABLET ORAL at 13:39

## 2022-02-10 RX ADMIN — SODIUM CHLORIDE, PRESERVATIVE FREE 10 ML: 5 INJECTION INTRAVENOUS at 05:07

## 2022-02-10 RX ADMIN — Medication: at 21:11

## 2022-02-10 RX ADMIN — ENOXAPARIN SODIUM 30 MG: 100 INJECTION SUBCUTANEOUS at 08:18

## 2022-02-10 RX ADMIN — DICLOFENAC SODIUM 2 G: 10 GEL TOPICAL at 17:36

## 2022-02-10 RX ADMIN — Medication: at 16:02

## 2022-02-10 NOTE — PROGRESS NOTES
Transition of Care Plan:     RUR: 10% - \"low risk\"   Disposition: IPR placement - [facility] (pending bed availability)  Follow up appointments: PCP and specialist as indicated    DME needed: Defer to IPR facility for DME needs   Transportation at Discharge: [facility] on will call for possible d/c today vs tomorrow (2/11/22) pending IPR bed availability; PCS to be completed & placed on chart  Keys or means to access home: N/A - pt transitioning to IPR at d/c   Medicare Letter: N/A - Medicaid coverage   Is patient a BCPI-A Bundle: N/A     Is patient a  and connected with the [place]? N/A               Caregiver Contact: Pt's mother Heidi Brower: [phone])  Discharge Caregiver contacted prior to discharge? To be contacted prior to d/c  Care Conference needed?: TBD    Update - 10:32 AM: [facility] admission liaison (Lin) reported no bed availability for admission today; Lin reported bed will likely be available tomorrow (2/11/22). CM reported update to attending MD.    Initial note: CM reviewed pt's chart prior to moving forward with d/c planning. CM requested for [facility] admission liaison (Lin) to confirm bed availability for admission today; request for updates pending at this time. Once bed availability is confirmed by admission liaison, CM will report information out to medical team. [facility] transport on will call for possible d/c today vs tomorrow (2/11/22) pending IPR bed availability; PCS to be completed & placed on chart. CM will continue to follow & remain accessible for d/c planning.     Shelley Vaca, MSW  Care Manager, [facility]

## 2022-02-10 NOTE — PROGRESS NOTES
Problem: Self Care Deficits Care Plan (Adult)  Goal: *Acute Goals and Plan of Care (Insert Text)  Description: FUNCTIONAL STATUS PRIOR TO ADMISSION: Lives with her mother, who is able to care for herself, but can not take care of the patient. History of frequent falls due to her R knee \"giving out\". Sleeping on the sofa. She indicates that the can not use a RW in the house \"because there is not enough room for it. \" She reports having difficulty getting dressed; likely does not get dressed every day. HOME SUPPORT: Mother    Occupational Therapy Goals  2/4/2022:  Goals were not achieved. All Goals remain appropriate to continue for 7 days,. Initiated 1/29/2022  1. Patient will perform grooming standing up to 2 minutes with contact guard assist within 7 day(s). 2.  Patient will perform lower body dressing with minimal assistance using AE as needed within 7 day(s). 3.  Patient will perform toilet transfers with supervision/SBA within 7 day(s). 4.  Patient will perform all aspects of toileting with minimal assistance within 7 day(s). 5.  Patient will participate in upper extremity therapeutic exercise/activities with Min cues for 10 minutes within 7 day(s). Outcome: Progressing Towards Goal   OCCUPATIONAL THERAPY TREATMENT  Patient: Jamaal Guerra (52 y.o. female)  Date: 2/10/2022  Diagnosis: Falls [W19. XXXA]  Inability to ambulate due to multiple joints [R26.2]  Cervical radiculopathy [M54.12]  Right knee pain [M25.561] <principal problem not specified>  Procedure(s) (LRB):  C3-4, C4-5, C5-6 ANTERIOR CERVICAL DISCECTOMY AND  FUSION  (N/A) 7 Days Post-Op  Precautions: Fall  Chart, occupational therapy assessment, plan of care, and goals were reviewed. ASSESSMENT  Patient continues with skilled OT services and is progressing towards goals.   Pt was supine in bed and did not have her Aspen collar on at that time and stated that her surgeon told her that she only had to have the [address] [employer] collar on when up working with OT and PT. OT worked with pt on coming supine to sit, and she wanted to do so with HOB elevated, and as she was pulling up on rails she was pushing her head into the bed and straining, which in turn is breaking her precautions for neck surgery. Pt was max of 2 for supine to sit and worked on sitting balance on EOb. OT had to remind pt numerous time to lean forward and keep her hands on knees. Pt was very verbose and has decreased attention span to task and needed max VC through out session to pay attention. Pt was max of 2 to stand with recliner in front of her, and pulling up on chair. Pt stood 2 times and worked leaning forward in order for stand to sit, in order to get her buttocks safely on bed. Pt was max of 2 for sit to supine, and left in bed with Aspen collar off and setup for lunch. Current Level of Function Impacting Discharge (ADLs): max for ADLs             PLAN :  Patient continues to benefit from skilled intervention to address the above impairments. Continue treatment per established plan of care to address goals. Recommend with staff: HOB elevated for meals    Recommend next OT session: work on sitting balance on EOb in prep for ADLs     Recommendation for discharge: (in order for the patient to meet his/her long term goals)  IPR    This discharge recommendation:  Has been made in collaboration with the attending provider and/or case management    IF patient discharges home will need the following DME: tbd       SUBJECTIVE:   Patient stated I have not been able to stand for two weeks. Floyd Arana    OBJECTIVE DATA SUMMARY:   Cognitive/Behavioral Status:  Neurologic State: Alert  Orientation Level: Oriented X4  Cognition: Impulsive; Impaired decision making;Decreased attention/concentration  Perception: Appears intact  Perseveration: Perseverates during conversation  Safety/Judgement: Lack of insight into deficits    Functional Mobility and Transfers for ADLs:  Bed Mobility:  Rolling: Moderate assistance;Assist x2  Supine to Sit: Moderate assistance;Assist x2;Maximum assistance  Sit to Supine: Maximum assistance; Moderate assistance  Scooting: Total assistance;Assist x2    Transfers:  Sit to Stand: Maximum assistance;Assist x2 (with chair infront of her to pull on)          Balance:  Sitting: Impaired; Without support  Sitting - Static: Poor (constant support); Fair (occasional)  Sitting - Dynamic: Poor (constant support); Fair (occasional)  Standing: Impaired; Without support  Standing - Static: Poor;Constant support  Standing - Dynamic : Poor;Constant support    ADL Intervention:  Feeding  Feeding Assistance: Set-up  Container Management: Maximum assistance; Moderate assistance  Cutting Food: Maximum assistance; Moderate assistance  Utensil Management: Independent  Food to Mouth: Independent  Drink to Mouth: Independent    Grooming  Position Performed: Long sitting on bed  Washing Face: Set-up  Washing Hands: Set-up  Brushing Teeth: Contact guard assistance;Set-up    Upper Body Bathing  Bathing Assistance: Maximum assistance;Minimum assistance  Position Performed: Long sitting on bed    Lower Body Bathing  Bathing Assistance: Total assistance(dependent)  Perineal  : Total assistance (dependent)  Position Performed: Supine              Toileting  Toileting Assistance: Total assistance(dependent); Maximum assistance  Bladder Hygiene: Total assistance (dependent); Maximum assistance  Bowel Hygiene: Total assistance (dependent); Maximum assistance    Cognitive Retraining  Safety/Judgement: Lack of insight into deficits    Therapeutic Exercises:       Pain:  None      Activity Tolerance:   Fair    After treatment patient left in no apparent distress:   Supine in bed and Call bell within reach    COMMUNICATION/COLLABORATION:   The patients plan of care was discussed with: Physical therapist and Registered nurse.      Beth Lore Aschoff, OT  Time Calculation: 38 mins

## 2022-02-10 NOTE — PROGRESS NOTES
Hospitalist Progress Note    NAME: Fernanda Baez   :  1980   MRN:  [id number]       Assessment / Plan:  Severe Cervical Spinal stenosis with Radiculopathy POA  Myelopathy with spinal cord compression  S/p anterior cervical discectomy decompression and fusionC3-C4, C4-C5 and C5-C6-2/3. Right knee pain POA  Recurrent falls. Physical deconditioning  History of arthritis  H/o L ankle Fracture s/p ORIF at [facility] ortho  Hypothyroidism (newly diagnosed) POA  Morbid obesity POA  Tobacco abuse     MRI C Spine=  1. Disc protrusions C4-C5 and C5-C6, with severe canal stenosis and cord  flattening. 2. Probable abnormal cord signal C4-C5. Possible abnormal cord signal C5-C6. 3. Moderate canal stenosis C3-C4. 4. Neural foraminal stenoses: bilateral C3-C4, right and severe left C4-C5, left  C5-C6.     Pt was under observation stay- changed to IP  after 2 [place] stay - failed outpatient management/treatment  Cont PO oxycodone prn severe pain only- avoid as able  Cont falls precautions  IP PT OT consult noted-- recommends IPR  IP  consulted- working on [employer] placement  IP Ortho consult appreciated-   - Generalized bony knee pain due to overload - NO KNEE intervention recommended  -  Wt loss through dietary changes   - Rehab for upper and lower body strengthening  Neuro eval for foot drop appreciated. Neurosurgery on boards/p S/p anterior cervical discectomy decompression and fusionC3-C4, C4-C5 and C5-C6-2/3. Dysphagia:  Post operative  Neurosurgery mentions that this is not unusal for patients to have some difficulty swallowing especially after undergoing multilevel discectomy  Mentions its getting better. Dysphagia improving  Xray C spine:  Recent anterior cervical fusion with prevertebral soft tissue  swelling as above. SLP consulted.     Acute on chronic anemia , most likely post op blood loss   Hb 13.6 on  , trended down   Check cbc iron profile ferritin   Hypothyroidism:    TSH = 11.3, started on Synthroid daily, Outpatient TSH as outpatient in 4 weeks for further dose titration     Active smoker:  Counselled on cessation  Patient declined nicotine patch.     Dispositionpatient medically stable for discharge, PT recommended rehab     Code Status: Full code  Surrogate Decision Maker: Mother 79 yrs old at home who cant help physically     DVT Prophylaxis: Lovenox  GI Prophylaxis: not indicated     Baseline: Ambulatory     Estimated discharge date: Medically ready  Barriers: Dispositioninpatient rehab     Recommended Disposition: [facility]/Rehab     Subjective:     Chief Complaint / Reason for Physician Visit  Follow-up cervical stenosis status post anterior cervical discectomy decompression  Denies any pain on the cervical area  Review of Systems:  Symptom Y/N Comments  Symptom Y/N Comments   Fever/Chills n   Chest Pain n    Poor Appetite n   Edema n    Cough    Abdominal Pain n    Sputum    Joint Pain     SOB/BECKETT    Pruritis/Rash     Nausea/vomit    Tolerating PT/OT     Diarrhea    Tolerating Diet     Constipation    Other       Could NOT obtain due to:      Objective:     VITALS:   Last 24hrs VS reviewed since prior progress note. Most recent are:  Patient Vitals for the past 24 hrs:   Temp Pulse Resp BP SpO2   02/10/22 0752    108/77    02/10/22 0749 98.6 °F (37 °C) (!) 107 20 (!) 124/107 96 %   02/10/22 0319 97.3 °F (36.3 °C) 90 20 134/74 90 %   02/09/22 1955 98.8 °F (37.1 °C) 94 22 (!) 108/50 92 %   02/09/22 1545 98.2 °F (36.8 °C) 98 20 (!) 132/58 96 %   02/09/22 1134 98.6 °F (37 °C) (!) 102 20 (!) 123/56 94 %     No intake or output data in the 24 hours ending 02/10/22 0904     I had a face to face encounter and independently examined this patient on 2/10/2022, as outlined below:  PHYSICAL EXAM:  General: WD, WN. Alert, cooperative, no acute distress    EENT:  EOMI. Anicteric sclerae. MMM, Incision site at lower neck covered with dressing, is dry  Resp:  CTA bilaterally, no wheezing or rales. No accessory muscle use  CV:  Regular  rhythm,  No edema  GI:  Soft, Non distended, Non tender. +Bowel sounds  Neurologic:  Alert and oriented X 3, normal speech,   Psych:   Good insight. Not anxious nor agitated  Skin:  No rashes. No jaundice    Reviewed most current lab test results and cultures  YES  Reviewed most current radiology test results   YES  Review and summation of old records today    NO  Reviewed patient's current orders and MAR    YES  PMH/SH reviewed - no change compared to H&P  ________________________________________________________________________  Care Plan discussed with:    Comments   Patient y    Family      RN y    Care Manager     Consultant                        Multidiciplinary team rounds were held today with , nursing, pharmacist and clinical coordinator. Patient's plan of care was discussed; medications were reviewed and discharge planning was addressed. ________________________________________________________________________  Total NON critical care TIME: 30  Minutes    Total CRITICAL CARE TIME Spent:   Minutes non procedure based      Comments   >50% of visit spent in counseling and coordination of care     ________________________________________________________________________  Martita Lopez MD     Procedures: see electronic medical records for all procedures/Xrays and details which were not copied into this note but were reviewed prior to creation of Plan. LABS:  I reviewed today's most current labs and imaging studies. Pertinent labs include:  No results for input(s): WBC, HGB, HCT, PLT, HGBEXT, HCTEXT, PLTEXT, HGBEXT, HCTEXT, PLTEXT in the last 72 hours. No results for input(s): NA, K, CL, CO2, GLU, BUN, CREA, CA, MG, PHOS, ALB, TBIL, TBILI, ALT, INR, INREXT, INREXT in the last 72 hours.     No lab exists for component: SGOT    Signed: Martita Lopez MD

## 2022-02-10 NOTE — PROGRESS NOTES
Problem: Mobility Impaired (Adult and Pediatric)  Goal: *Acute Goals and Plan of Care (Insert Text)  Description: FUNCTIONAL STATUS PRIOR TO ADMISSION: recurrent falls due to R knee buckling; has difficulty getting off sofa where she sleeps; reports home is too cluttered to use a RW. HOME SUPPORT PRIOR TO ADMISSION: The patient lived with mother in 1 story home with 4 steps to enter and unstable rails. Mother assists patient with ADLs and meals. Physical Therapy Goals  Initiated 1/29/2022. Re-Eval 2/4/2022 due to ACDF, goals remain appropriate. 1.  Patient will move from supine to sit and sit to supine , scoot up and down, and roll side to side in bed with supervision/set-up within 7 day(s). 2.  Patient will transfer from bed to chair and chair to bed with minimal assistance/contact guard assist using the least restrictive device within 7 day(s). 3.  Patient will perform sit to stand with minimal assistance/contact guard assist within 7 day(s). 4.  Patient will ambulate with minimal assistance/contact guard assist for 35 feet with the least restrictive device within 7 day(s). 5.  Patient will ascend/descend 4 stairs with 1 handrail(s) with minimal assistance/contact guard assist within 7 day(s). Outcome: Progressing Towards Goal     PHYSICAL THERAPY TREATMENT  Patient: Kenn Cleveland (52 y.o. female)  Date: 2/10/2022  Diagnosis: Falls [W19. XXXA]  Inability to ambulate due to multiple joints [R26.2]  Cervical radiculopathy [M54.12]  Right knee pain [M25.561] <principal problem not specified>  Procedure(s) (LRB):  C3-4, C4-5, C5-6 ANTERIOR CERVICAL DISCECTOMY AND  FUSION  (N/A) 7 Days Post-Op  Precautions: Fall  Chart, physical therapy assessment, plan of care and goals were reviewed. ASSESSMENT  Nurse clears pt for mobility. Patient continues with skilled PT services and is slowly progressing towards goals. She is in bed without Aspen collar donned. .. she is initially reluctant to britta. After at length discussion of log roll education, she is agreeable to britta after log roll efforts. .. she is also reluctant to begin attempts at donning herself. .. OT donned this date (see OT note). Pt presents with increased anxiety, very easily distracted from mobility tasks once initiated and need for consistent re-direction to keep participating and following directions. Even when pt educated and provided multi-modal max. Cuing for hand placement throughout bed mob./transfers and sitting edge of bed balance efforts, she is unable to consistently demo. Comprehension of what is being asked of her to complete. Pt would lift B UE's off of the bed and lean posteriorly rather than initiate use of BUE's and anterior weight-shifting despite max. Cuing. She would want to carry on conversation with focus on that rather than gaining her balance. She is able to stand at bedside this date 1 min. Trials with heavy support via BUE's on locked chair. Linen/chux changes completed due to soiled this date. Continue to follow. Current Level of Function Impacting Discharge (mobility/balance): bed mob. Mod./max. x 2 ; transfers mod./max. x 2    Other factors to consider for discharge: pt oriented x 4 but with increased difficulties processing education/directions with increased anxiety. .. ? Psych. consult         PLAN :  Patient continues to benefit from skilled intervention to address the above impairments. Continue treatment per established plan of care. to address goals. Recommendation for discharge: (in order for the patient to meet his/her long term goals)  Therapy 3 hours per day 5-7 days per week vs. SNF    This discharge recommendation:  Has been made in collaboration with the attending provider and/or case management    IF patient discharges home will need the following DME: to be determined (TBD)       SUBJECTIVE:   Patient stated I am going to try.     OBJECTIVE DATA SUMMARY:   Critical Behavior:  Neurologic State: Alert  Orientation Level: Oriented X4  Cognition: Impulsive,Impaired decision making,Decreased attention/concentration  Safety/Judgement: Lack of insight into deficits  Functional Mobility Training:  Bed Mobility:  Rolling: Moderate assistance;Assist x2  Supine to Sit: Moderate assistance;Assist x2;Maximum assistance  Sit to Supine: Maximum assistance; Moderate assistance  Scooting: Total assistance;Assist x2        Transfers:  Sit to Stand: Moderate assistance;Maximum assistance;Assist x2 (with chair infront of her to pull on)  Stand to Sit: Maximum assistance;Assist x2             Balance:  Sitting: Impaired; Without support  Sitting - Static: Poor (constant support); Fair (occasional) (consistently leaning posteriorly)  Sitting - Dynamic: Poor (constant support); Fair (occasional)  Standing: Impaired; Without support  Standing - Static: Constant support;Fair;Poor  Standing - Dynamic : Poor;Constant support    Pain Rating:  C/o R knee pain with tactile cuing, pt did not rate ; however. No c/o knee pain with standing attempts    Activity Tolerance:   Fair and requires frequent rest breaks    After treatment patient left in no apparent distress:   Call bell within reach, Bed / chair alarm activated, Side rails x 3, and in bed with HOB raised ; lunch tray set-up    COMMUNICATION/COLLABORATION:   The patients plan of care was discussed with: Occupational therapist and Registered nurse.      Penny Neal, PT, DPT   Time Calculation: 33 mins

## 2022-02-10 NOTE — PROGRESS NOTES
End of Shift Note    Bedside shift change report given to Duran Daija (oncoming nurse) by Steve Mckee RN (offgoing nurse). Report included the following information SBAR, Kardex, Procedure Summary, Intake/Output, MAR and Recent Results    Shift worked: Nights      Shift summary and any significant changes:     Pt incontinent while in bed states she doesn't know when she is going. Drsg to anterior neck clean dry and intact. Continues to touch and scratch it with diry nails and hands. Drsg to rt hip for large open blister. Possible dc today to [facility] per CM awaiting more updates. Concerns for physician to address:  none     Zone phone for oncoming shift:   [phone]         Activity:  Activity Level: Up with Assistance  Number times ambulated in hallways past shift: 0  Number of times OOB to chair past shift: 1    Cardiac:   Cardiac Monitoring: No      Cardiac Rhythm: Sinus Rhythm    Access:   Current line(s): PIV     Genitourinary:   Urinary status: voiding and incontinent    Respiratory:   O2 Device: None (Room air)  Chronic home O2 use?: NO  Incentive spirometer at bedside: YES  Actual Volume (ml): 800 ml  GI:  Last Bowel Movement Date: 02/08/22  Current diet:  ADULT ORAL NUTRITION SUPPLEMENT Breakfast, Lunch, Dinner; Low Calorie/High Protein  ADULT DIET Dysphagia - Soft & Bite Sized; Chocolate Ensure high protein preferred  Passing flatus: YES  Tolerating current diet: YES       Pain Management:   Patient states pain is manageable on current regimen: YES    Skin:  Tru Score: 14  Interventions: float heels, increase time out of bed, foam dressing, PT/OT consult, limit briefs and nutritional support     Patient Safety:  Fall Score:  Total Score: 4  Interventions: bed/chair alarm, assistive device (walker, cane, etc), gripper socks, pt to call before getting OOB and stay with me (per policy)  High Fall Risk: Yes    Length of Stay:  Expected LOS: 2d 12h  Actual LOS: 2558 [facility] RN

## 2022-02-10 NOTE — PROGRESS NOTES
End of Shift Note    Bedside shift change report given to Dmitri Serna RN  (oncoming nurse) by Fito Alston RN (offgoing nurse). Report included the following information SBAR, Kardex, Procedure Summary, Intake/Output, MAR and Recent Results    Shift worked: days   Shift summary and any significant changes:    Pt is looking forward to when she can go outside but refused to have the window shade open. Pt seems to have mild anxiety, but manageable. Pt was turned q2h today and sacrum monitored and barrier cream applied. Pt able to take pills whole, one at a time, with no problems. Concerns for physician to address:  none     Zone phone for oncoming shift:  [phone]     Activity:  Activity Level: Up with Assistance  Number times ambulated in hallways past shift: 0  Number of times OOB to chair past shift: 1    Cardiac:   Cardiac Monitoring: No      Cardiac Rhythm:  (tachy)    Access:   Current line(s): PIV     Genitourinary:   Urinary status: voiding and incontinent    Respiratory:   O2 Device: None (Room air)  Chronic home O2 use?: NO  Incentive spirometer at bedside: YES  Actual Volume (ml): 800 ml  GI:  Last Bowel Movement Date: 02/10/22  Current diet:  ADULT ORAL NUTRITION SUPPLEMENT Breakfast, Lunch, Dinner; Low Calorie/High Protein  ADULT DIET Dysphagia - Soft & Bite Sized; Chocolate Ensure high protein preferred  Passing flatus: YES  Tolerating current diet: YES       Pain Management:   Patient states pain is manageable on current regimen: YES    Skin:  Tru Score: 14  Interventions: float heels, increase time out of bed, foam dressing, PT/OT consult, limit briefs and nutritional support     Patient Safety:  Fall Score:  Total Score: 4  Interventions: bed/chair alarm, assistive device (walker, cane, etc), gripper socks, pt to call before getting OOB and stay with me (per policy)  High Fall Risk: Yes    Length of Stay:  Expected LOS: 2d 12h  Actual LOS: 10      Fito Alston RN

## 2022-02-11 LAB
ANION GAP SERPL CALC-SCNC: 3 MMOL/L (ref 5–15)
BASOPHILS # BLD: 0 K/UL (ref 0–0.1)
BASOPHILS NFR BLD: 0 % (ref 0–1)
BUN SERPL-MCNC: 16 MG/DL (ref 6–20)
BUN/CREAT SERPL: 23 (ref 12–20)
CALCIUM SERPL-MCNC: 8.7 MG/DL (ref 8.5–10.1)
CHLORIDE SERPL-SCNC: 106 MMOL/L (ref 97–108)
CO2 SERPL-SCNC: 30 MMOL/L (ref 21–32)
CREAT SERPL-MCNC: 0.69 MG/DL (ref 0.55–1.02)
DIFFERENTIAL METHOD BLD: ABNORMAL
EOSINOPHIL # BLD: 0.2 K/UL (ref 0–0.4)
EOSINOPHIL NFR BLD: 2 % (ref 0–7)
ERYTHROCYTE [DISTWIDTH] IN BLOOD BY AUTOMATED COUNT: 14.7 % (ref 11.5–14.5)
FERRITIN SERPL-MCNC: 372 NG/ML (ref 26–388)
GLUCOSE SERPL-MCNC: 96 MG/DL (ref 65–100)
HCT VFR BLD AUTO: 36.9 % (ref 35–47)
HGB BLD-MCNC: 11.7 G/DL (ref 11.5–16)
IMM GRANULOCYTES # BLD AUTO: 0.1 K/UL (ref 0–0.04)
IMM GRANULOCYTES NFR BLD AUTO: 1 % (ref 0–0.5)
IRON SATN MFR SERPL: 20 % (ref 20–50)
IRON SERPL-MCNC: 52 UG/DL (ref 35–150)
LYMPHOCYTES # BLD: 2.8 K/UL (ref 0.8–3.5)
LYMPHOCYTES NFR BLD: 29 % (ref 12–49)
MCH RBC QN AUTO: 36.6 PG (ref 26–34)
MCHC RBC AUTO-ENTMCNC: 31.7 G/DL (ref 30–36.5)
MCV RBC AUTO: 115.3 FL (ref 80–99)
MONOCYTES # BLD: 0.6 K/UL (ref 0–1)
MONOCYTES NFR BLD: 6 % (ref 5–13)
NEUTS SEG # BLD: 5.9 K/UL (ref 1.8–8)
NEUTS SEG NFR BLD: 62 % (ref 32–75)
NRBC # BLD: 0 K/UL (ref 0–0.01)
NRBC BLD-RTO: 0 PER 100 WBC
PLATELET # BLD AUTO: 296 K/UL (ref 150–400)
PMV BLD AUTO: 10.4 FL (ref 8.9–12.9)
POTASSIUM SERPL-SCNC: 3.6 MMOL/L (ref 3.5–5.1)
RBC # BLD AUTO: 3.2 M/UL (ref 3.8–5.2)
RBC MORPH BLD: ABNORMAL
SODIUM SERPL-SCNC: 139 MMOL/L (ref 136–145)
T3FREE SERPL-MCNC: 1.9 PG/ML (ref 2.2–4)
T4 FREE SERPL-MCNC: 1.1 NG/DL (ref 0.8–1.5)
TIBC SERPL-MCNC: 261 UG/DL (ref 250–450)
TSH SERPL DL<=0.05 MIU/L-ACNC: 4.74 UIU/ML (ref 0.36–3.74)
WBC # BLD AUTO: 9.6 K/UL (ref 3.6–11)

## 2022-02-11 PROCEDURE — 97530 THERAPEUTIC ACTIVITIES: CPT

## 2022-02-11 PROCEDURE — 74011250637 HC RX REV CODE- 250/637: Performed by: NURSE PRACTITIONER

## 2022-02-11 PROCEDURE — 74011250637 HC RX REV CODE- 250/637: Performed by: NEUROLOGICAL SURGERY

## 2022-02-11 PROCEDURE — 65270000029 HC RM PRIVATE

## 2022-02-11 PROCEDURE — 36415 COLL VENOUS BLD VENIPUNCTURE: CPT

## 2022-02-11 PROCEDURE — 83540 ASSAY OF IRON: CPT

## 2022-02-11 PROCEDURE — 82728 ASSAY OF FERRITIN: CPT

## 2022-02-11 PROCEDURE — 97535 SELF CARE MNGMENT TRAINING: CPT

## 2022-02-11 PROCEDURE — 85025 COMPLETE CBC W/AUTO DIFF WBC: CPT

## 2022-02-11 PROCEDURE — 80048 BASIC METABOLIC PNL TOTAL CA: CPT

## 2022-02-11 PROCEDURE — 74011250636 HC RX REV CODE- 250/636: Performed by: STUDENT IN AN ORGANIZED HEALTH CARE EDUCATION/TRAINING PROGRAM

## 2022-02-11 PROCEDURE — 84443 ASSAY THYROID STIM HORMONE: CPT

## 2022-02-11 PROCEDURE — 84481 FREE ASSAY (FT-3): CPT

## 2022-02-11 PROCEDURE — 84439 ASSAY OF FREE THYROXINE: CPT

## 2022-02-11 PROCEDURE — 74011000250 HC RX REV CODE- 250: Performed by: NEUROLOGICAL SURGERY

## 2022-02-11 RX ORDER — LEVOTHYROXINE SODIUM 75 UG/1
75 TABLET ORAL
Status: DISCONTINUED | OUTPATIENT
Start: 2022-02-12 | End: 2022-02-13 | Stop reason: HOSPADM

## 2022-02-11 RX ADMIN — ENOXAPARIN SODIUM 30 MG: 100 INJECTION SUBCUTANEOUS at 08:47

## 2022-02-11 RX ADMIN — ENOXAPARIN SODIUM 30 MG: 100 INJECTION SUBCUTANEOUS at 21:39

## 2022-02-11 RX ADMIN — SODIUM CHLORIDE, PRESERVATIVE FREE 10 ML: 5 INJECTION INTRAVENOUS at 05:54

## 2022-02-11 RX ADMIN — ACETAMINOPHEN 650 MG: 325 TABLET ORAL at 15:41

## 2022-02-11 RX ADMIN — Medication: at 23:20

## 2022-02-11 RX ADMIN — GABAPENTIN 300 MG: 300 CAPSULE ORAL at 08:47

## 2022-02-11 RX ADMIN — LEVOTHYROXINE SODIUM 50 MCG: 0.05 TABLET ORAL at 05:53

## 2022-02-11 RX ADMIN — ACETAMINOPHEN 650 MG: 325 TABLET ORAL at 23:18

## 2022-02-11 RX ADMIN — SODIUM CHLORIDE, PRESERVATIVE FREE 10 ML: 5 INJECTION INTRAVENOUS at 14:00

## 2022-02-11 RX ADMIN — Medication: at 15:43

## 2022-02-11 RX ADMIN — Medication 1 AMPULE: at 21:38

## 2022-02-11 RX ADMIN — PANTOPRAZOLE SODIUM 40 MG: 40 TABLET, DELAYED RELEASE ORAL at 08:47

## 2022-02-11 RX ADMIN — GABAPENTIN 300 MG: 300 CAPSULE ORAL at 15:41

## 2022-02-11 RX ADMIN — DICLOFENAC SODIUM 2 G: 10 GEL TOPICAL at 09:00

## 2022-02-11 RX ADMIN — DICLOFENAC SODIUM 2 G: 10 GEL TOPICAL at 23:15

## 2022-02-11 RX ADMIN — GABAPENTIN 300 MG: 300 CAPSULE ORAL at 21:38

## 2022-02-11 RX ADMIN — ACETAMINOPHEN 650 MG: 325 TABLET ORAL at 05:53

## 2022-02-11 RX ADMIN — DICLOFENAC SODIUM 2 G: 10 GEL TOPICAL at 15:43

## 2022-02-11 RX ADMIN — DICLOFENAC SODIUM 2 G: 10 GEL TOPICAL at 18:00

## 2022-02-11 RX ADMIN — SODIUM CHLORIDE, PRESERVATIVE FREE 10 ML: 5 INJECTION INTRAVENOUS at 23:19

## 2022-02-11 RX ADMIN — Medication: at 09:00

## 2022-02-11 NOTE — PROGRESS NOTES
Hospitalist Progress Note    NAME: Brandy Carter   :  1980   MRN:  [id number]       Assessment / Plan:  Severe Cervical Spinal stenosis with Radiculopathy POA  Myelopathy with spinal cord compression  S/p anterior cervical discectomy decompression and fusionC3-C4, C4-C5 and C5-C6-2/3. Right knee pain POA  Recurrent falls. Physical deconditioning  History of arthritis  H/o L ankle Fracture s/p ORIF at [facility] ortho  Hypothyroidism (newly diagnosed) POA  Morbid obesity POA  Tobacco abuse     MRI C Spine=  1. Disc protrusions C4-C5 and C5-C6, with severe canal stenosis and cord  flattening. 2. Probable abnormal cord signal C4-C5. Possible abnormal cord signal C5-C6. 3. Moderate canal stenosis C3-C4. 4. Neural foraminal stenoses: bilateral C3-C4, right and severe left C4-C5, left  C5-C6.     Pt was under observation stay- changed to IP  after 2 MN stay - failed outpatient management/treatment  Cont PO oxycodone prn severe pain only- avoid as able  Cont falls precautions  IP PT OT consult noted-- recommends IPR  IP  consulted- working on [employer] placement  IP Ortho consult appreciated-   - Generalized bony knee pain due to overload - NO KNEE intervention recommended  -  Wt loss through dietary changes   - Rehab for upper and lower body strengthening  Neuro eval for foot drop appreciated. Neurosurgery on boards/p S/p anterior cervical discectomy decompression and fusionC3-C4, C4-C5 and C5-C6-2/3. Dysphagia:  Post operative  Neurosurgery mentions that this is not unusal for patients to have some difficulty swallowing especially after undergoing multilevel discectomy  Mentions its getting better. Dysphagia improving  Xray C spine:  Recent anterior cervical fusion with prevertebral soft tissue  swelling as above. SLP consulted.     Acute on chronic anemia , most likely post op blood loss   Hb 13.6 on  , trended down to 10.6, repeat hemoglobin is 11.7 today  Iron panel is within normal limits  Hypothyroidism:    TSH = 11.3, started on Synthroid daily, repeat TSH is around four, will increase Synthroid    Active smoker:  Counselled on cessation  Patient declined nicotine patch.     Dispositionpatient medically stable for discharge, PT recommended rehab     Code Status: Full code  Surrogate Decision Maker: Mother 79 yrs old at home who cant help physically     DVT Prophylaxis: Lovenox  GI Prophylaxis: not indicated     Baseline: Ambulatory     Estimated discharge date: Medically ready  Barriers: Dispositioninpatient rehab     Recommended Disposition: [facility]/Rehab     Subjective:     Chief Complaint / Reason for Physician Visit  Follow-up cervical stenosis status post anterior cervical discectomy decompression  No acute issues  Review of Systems:  Symptom Y/N Comments  Symptom Y/N Comments   Fever/Chills n   Chest Pain n    Poor Appetite n   Edema n    Cough    Abdominal Pain n    Sputum    Joint Pain     SOB/BECKETT    Pruritis/Rash     Nausea/vomit    Tolerating PT/OT     Diarrhea    Tolerating Diet     Constipation    Other       Could NOT obtain due to:      Objective:     VITALS:   Last 24hrs VS reviewed since prior progress note. Most recent are:  Patient Vitals for the past 24 hrs:   Temp Pulse Resp BP SpO2   02/11/22 0728 (!) 96.7 °F (35.9 °C) 97 19 (!) 138/55 95 %   02/11/22 0334 97 °F (36.1 °C) (!) 105 18 129/60 92 %   02/10/22 1920 98.4 °F (36.9 °C) 95 18 126/69 92 %   02/10/22 1607 98.7 °F (37.1 °C) 85 18 116/61 95 %   02/10/22 1316 99.2 °F (37.3 °C) (!) 104 16 (!) 100/54 94 %     No intake or output data in the 24 hours ending 02/11/22 0819     I had a face to face encounter and independently examined this patient on 2/11/2022, as outlined below:  PHYSICAL EXAM:  General: WD, WN. Alert, cooperative, no acute distress    EENT:  EOMI. Anicteric sclerae. MMM, Incision site at lower neck covered with dressing, is dry  Resp:  CTA bilaterally, no wheezing or rales.   No accessory muscle use  CV:  Regular  rhythm,  No edema  GI:  Soft, Non distended, Non tender. +Bowel sounds  Neurologic:  Alert and oriented X 3, normal speech,   Psych:   Good insight. Not anxious nor agitated  Skin:  No rashes. No jaundice    Reviewed most current lab test results and cultures  YES  Reviewed most current radiology test results   YES  Review and summation of old records today    NO  Reviewed patient's current orders and MAR    YES  PMH/SH reviewed - no change compared to H&P  ________________________________________________________________________  Care Plan discussed with:    Comments   Patient y    Family      RN y    Care Manager     Consultant                        Multidiciplinary team rounds were held today with , nursing, pharmacist and clinical coordinator. Patient's plan of care was discussed; medications were reviewed and discharge planning was addressed. ________________________________________________________________________  Total NON critical care TIME: 30  Minutes    Total CRITICAL CARE TIME Spent:   Minutes non procedure based      Comments   >50% of visit spent in counseling and coordination of care     ________________________________________________________________________  Julio Ivey MD     Procedures: see electronic medical records for all procedures/Xrays and details which were not copied into this note but were reviewed prior to creation of Plan. LABS:  I reviewed today's most current labs and imaging studies.   Pertinent labs include:  Recent Labs     02/11/22 0248   WBC 9.6   HGB 11.7   HCT 36.9        Recent Labs     02/11/22 0248      K 3.6      CO2 30   GLU 96   BUN 16   CREA 0.69   CA 8.7       Signed: Julio Ivey MD

## 2022-02-11 NOTE — PROGRESS NOTES
Transition of Care Plan:     RUR: 11% - \"low risk\"   Disposition: IPR placement - [facility] (pending bed availability - auth expires 2/22/22)  Follow up appointments: Pardeep Celis indicated    DME needed: Defer to IPR facility for DME needs   Transportation at [address] on will call; PCS to be completed & placed on chart  Keys or means to access home: N/A - pt transitioning to IPR at d/c   Medicare Letter: N/A - Medicaid coverage   Is patient a BCPI-A Bundle: N/A     Is patient a  and connected with the [facility]? N/A               Caregiver Contact: Pt's mother Reyes Haver [phone])  Discharge Caregiver contacted prior to discharge? To be contacted prior to d/c  Care Conference needed?: TBD    Update - 12:27 PM: THERESA consulted with [facility] admission liaison regarding insurance auth; Lin reported pt's insurance Kody Pablo will remain active until 2/22/22. Lin reported she will continue to follow throughout the weekend in the event a bed becomes available at the facility. THERESA requested for Lin to contact the pt directly to discuss delay in admission; Lin completed request.    CM met with pt at bedside, introduced role as d/c planner, and provided update regarding IPR placement. Pt reported she already received a call from the facility's admission liaison & is aware of delay in admission. CM inquired if pt had any immediate questions or concerns related to the d/c plan; pt declined. Update - 11:36 AM: CM received update from [facility] admission liaison reporting the facility does not have a bed available to accept pt today. CM reported update to attending MD. CM canceled [facility] transport scheduled for 3:15 PM and placed trip back on will call. CM [facility]. CM will meet with pt at bedside to provide update and address questions/concerns as needed. Initial note: CM reviewed pt's chart prior to moving forward with d/c planning. CM requested for [facility] admission liaison (Lin) to confirm bed availability at the facility for admission today; request for updates pending. CM took pt off will call with [facility] & requested pick-up at 3:15 PM this afternoon; request pending confirmation. CM will coordinate updates with pt and mother Toby Cotter). CM will continue to follow for d/c planning & report updates as they become available.     Lynn Mitchell MSW  Care Manager, [facility]

## 2022-02-11 NOTE — PROGRESS NOTES
Problem: Mobility Impaired (Adult and Pediatric)  Goal: *Acute Goals and Plan of Care (Insert Text)  Description: FUNCTIONAL STATUS PRIOR TO ADMISSION: recurrent falls due to R knee buckling; has difficulty getting off sofa where she sleeps; reports home is too cluttered to use a RW. HOME SUPPORT PRIOR TO ADMISSION: The patient lived with mother in 1 story home with 4 steps to enter and unstable rails. Mother assists patient with ADLs and meals. Physical Therapy Goals  Initiated 1/29/2022. Re-Eval 2/4/2022 due to ACDF, goals remain appropriate. Goals reviewed and remain appropriate 2/11/2022  1. Patient will move from supine to sit and sit to supine , scoot up and down, and roll side to side in bed with supervision/set-up within 7 day(s). 2.  Patient will transfer from bed to chair and chair to bed with minimal assistance/contact guard assist using the least restrictive device within 7 day(s). 3.  Patient will perform sit to stand with minimal assistance/contact guard assist within 7 day(s). 4.  Patient will ambulate with minimal assistance/contact guard assist for 35 feet with the least restrictive device within 7 day(s). 5.  Patient will ascend/descend 4 stairs with 1 handrail(s) with minimal assistance/contact guard assist within 7 day(s). Outcome: Progressing Towards Goal   PHYSICAL THERAPY TREATMENT: WEEKLY REASSESSMENT  Patient: Larisa Vanegas (15 y.o. female)  Date: 2/11/2022  Primary Diagnosis: Falls [W19. XXXA]  Inability to ambulate due to multiple joints [R26.2]  Cervical radiculopathy [M54.12]  Right knee pain [M25.561]  Procedure(s) (LRB):  C3-4, C4-5, C5-6 ANTERIOR CERVICAL DISCECTOMY AND  FUSION  (N/A) 8 Days Post-Op   Precautions:  Fall      ASSESSMENT  Patient continues with skilled PT services and is slowly progressing towards goals.  Patient continues to demonstrate limited functional mobility and decreased independence secondary to impaired sitting balance, decreased ROM, increased BUE and BLE edema, generalized weakness, RLE numbness, limited endurance, cervical precautions, and decreased activity tolerance. Received supine in bed and agreeable to PT intervention. Patient with improvements towards goals this date and with good participation. Agreeable to britta c-collar, requiring mod A to britta brace. Practiced log rolling and supine<>sit transfers, requiring VCs and assist for positioning of BLEs and cueing to utilize BUEs to reach for bed railing. Patient able to sit on EOB x ~ 10 minutes with CGA/SBA for support. Patient able to participate in ADL tasks while seated at EOB, however did demonstrate multidirectional swaying and posterior lean, needing verbal and tactile cueing to correct. Instructed patient through seated exercises as noted below while sitting on EOB. Pt was left in modified chair position in bed with all needs met, RN aware, and MD present following session. Continue to recommend rehab at discharge to address functional impairments as noted above. Patient's progression toward goals since last assessment: Slow progress; goals remain appropriate    Current Level of Function Impacting Discharge (mobility/balance): min-max A x 2 for bed mobility    Functional Outcome Measure: The patient scored 35/100 on the Barthel Index outcome measure which is indicative of 65% impairment in ADLs and mobility. Other factors to consider for discharge: increased risk for falls; decline from baseline mobility         PLAN :  Goals have been updated based on progression since last assessment. Patient continues to benefit from skilled intervention to address the above impairments.     Recommendations and Planned Interventions: bed mobility training, transfer training, gait training, therapeutic exercises, patient and family training/education, and therapeutic activities      Frequency/Duration: Patient will be followed by physical therapy:  5 times a week to address goals. Recommendation for discharge: (in order for the patient to meet his/her long term goals)  Therapy 3 hours per day 5-7 days per week    This discharge recommendation:  Has been made in collaboration with the attending provider and/or case management    IF patient discharges home will need the following DME: to be determined (TBD)         SUBJECTIVE:   Patient stated My leg is still numb.     OBJECTIVE DATA SUMMARY:   HISTORY:    Past Medical History:   Diagnosis Date    Arthritis     Hypothyroidism      Past Surgical History:   Procedure Laterality Date    HX ANKLE FRACTURE TX Left     ORIF       Personal factors and/or comorbidities impacting plan of care: arthritis    Home Situation  Home Environment: Private residence  # Steps to Enter: 4  Rails to Enter: Yes  Hand Rails : Bilateral (unstable)  One/Two Story Residence: One story  Living Alone: No  Support Systems: Parent(s)  Patient Expects to be Discharged to[de-identified] Rehab hospital/unit acute  Current DME Used/Available at Home: Crutches,Walker, rolling  Tub or Shower Type: Tub/Shower combination    EXAMINATION/PRESENTATION/DECISION MAKING:   Critical Behavior:  Neurologic State: Alert  Orientation Level: Oriented X4  Cognition: Follows commands  Safety/Judgement: Fall prevention  Hearing: Auditory  Auditory Impairment: None  Hearing Aids/Status: Does not own  Skin:  Cervical surgical incision  Edema: BUE and BLE edema  Range Of Motion:  AROM: Generally decreased, functional           PROM: Within functional limits           Strength:    Strength: Generally decreased, functional                    Tone & Sensation:   Tone: Normal              Sensation: Impaired (RLE impaired)               Coordination:  Coordination: Generally decreased, functional  Vision:      Functional Mobility:  Bed Mobility:  Rolling: Moderate assistance; Additional time; Adaptive equipment (use of bed railing)  Supine to Sit: Minimum assistance; Moderate assistance;Assist x2;Additional time; Adaptive equipment (use of bed railing)  Sit to Supine: Minimum assistance;Assist x2; Additional time; Adaptive equipment (use of bed railing)  Scooting: Maximum assistance  Transfers:                             Balance:   Sitting: Impaired  Sitting - Static: Fair (occasional)  Sitting - Dynamic: Fair (occasional)  Ambulation/Gait Training:            Therapeutic Exercises:   Seated LAQ 5x BLE  Seated hip flexion 5x BLE  Seated multi-directional reaching with SBA/CGA for safety    Functional Measure:  Barthel Index:    Bathin  Bladder: 5  Bowels: 5  Groomin  Dressin  Feeding: 10  Mobility: 0  Stairs: 0  Toilet Use: 0  Transfer (Bed to Chair and Back): 5  Total: 35/100       The Barthel ADL Index: Guidelines  1. The index should be used as a record of what a patient does, not as a record of what a patient could do. 2. The main aim is to establish degree of independence from any help, physical or verbal, however minor and for whatever reason. 3. The need for supervision renders the patient not independent. 4. A patient's performance should be established using the best available evidence. Asking the patient, friends/relatives and nurses are the usual sources, but direct observation and common sense are also important. However direct testing is not needed. 5. Usually the patient's performance over the preceding 24-48 hours is important, but occasionally longer periods will be relevant. 6. Middle categories imply that the patient supplies over 50 per cent of the effort. 7. Use of aids to be independent is allowed. Score Interpretation (from [address] [facility] 83)    Independent   60-79 Minimally independent   40-59 Partially dependent   20-39 Very dependent   <20 Totally dependent     -Iggy Barker., Barthel, D.W. (1965). Functional evaluation: the Barthel Index. [address] [facility] ([address] [place] [address] (1997).  The Barthel activities of daily living index: self-reporting versus actual performance in the old (> or = 75 years). Journal of [address] [place] [address] 45(9), [address] [facility], THANG, Carla Vanegas., Briana Ovalles. (1999). Measuring the change in disability after inpatient rehabilitation; comparison of the responsiveness of the Barthel Index and Functional [place] Measure. Journal of Neurology, Neurosurgery, and Psychiatry, 66(4), 655-916. CHLOE Levin, EVER Coles, & Luis Ryder M.A. (2004) Assessment of post-stroke quality of life in cost-effectiveness studies: The usefulness of the Barthel Index and the EuroQoL-5D. Quality of Life Research, 13, 427-43          Pain Rating:  Did not report pain    Activity Tolerance:   Fair and requires rest breaks    After treatment patient left in no apparent distress:   Call bell within reach, Caregiver / family present, Side rails x 3, and chair position in bed    COMMUNICATION/EDUCATION:   The patients plan of care was discussed with: Physical therapist, Occupational therapist, Registered nurse, Physician, and Case management. Fall prevention education was provided and the patient/caregiver indicated understanding., Patient/family have participated as able in goal setting and plan of care. , and Patient/family agree to work toward stated goals and plan of care.     Thank you for this referral.  David Shahid, PT, DPT   Time Calculation: 31 mins

## 2022-02-11 NOTE — PROGRESS NOTES
End of Shift Note    Bedside shift change report given to iva (oncoming nurse) by Robert Butler RN (offgoing nurse). Report included the following information SBAR    Shift worked:  nights   Shift summary and any significant changes:     changed dressing on neck incision       Concerns for physician to address:  none    Zone phone for oncoming shift:        Patient Han Khan  41 y.o.  1/28/2022 10:46 PM by Chante Andino MD. Constantin Escalante was admitted from Home    Problem List  Patient Active Problem List    Diagnosis Date Noted    Cervical radiculopathy 01/31/2022    Right knee pain 01/31/2022    Inability to ambulate due to multiple joints 01/31/2022    Falls 01/29/2022     Past Medical History:   Diagnosis Date    Arthritis     Hypothyroidism        Core Measures:  CVA: No No  CHF:No No  PNA:No No    Activity:  Activity Level: Up with Assistance  Number times ambulated in hallways past shift: 0  Number of times OOB to chair past shift: 0    Cardiac:   Cardiac Monitoring: No      Cardiac Rhythm:  (tachy)    Access:   Current line(s): PIV   Central Line? No Placement date  Reason Medically Necessary   PICC LINE? No Placement date Reason Medically Necessary     Genitourinary:   Urinary status: incontinent  Urinary Catheter? No Placement Date  Reason Medically Necessary     Respiratory:   O2 Device: None (Room air)  Chronic home O2 use?: NO  Incentive spirometer at bedside: YES  Actual Volume (ml): 800 ml    GI:  Last Bowel Movement Date: 02/10/22  Current diet:  ADULT ORAL NUTRITION SUPPLEMENT Breakfast, Lunch, Dinner; Low Calorie/High Protein  ADULT DIET Dysphagia - Soft & Bite Sized; Chocolate Ensure high protein preferred  Passing flatus: YES  Tolerating current diet: YES       Pain Management:   Patient states pain is manageable on current regimen: YES    Skin:  Tru Score: 16  Interventions: increase time out of bed    Patient Safety:  Fall Score:  Total Score: 4  Interventions: bed/chair alarm  High Fall Risk: Yes  @Rollbelt  @dexterity to release roll belt  Yes/No ( must document dexterity  here by stating Yes or No here, otherwise this is a restraint and must follow restraint documentation policy.)    DVT prophylaxis:  DVT prophylaxis Med- Yes  DVT prophylaxis SCD or SHARI- No     Wounds: (If Applicable)  Wounds- Yes  Location skin tear R thigh, sacrum red     Active Consults:  IP CONSULT TO ORTHOPEDIC SURGERY  IP CONSULT TO NEUROLOGY  IP CONSULT TO NEUROSURGERY    Length of Stay:  Expected LOS: 2d 12h  Actual LOS: 11  Discharge Plan: Yes rachna Mullen RN

## 2022-02-11 NOTE — PROGRESS NOTES
Ortho / Neurosurgery NP Note    POD# 8 s/p C3-4, C4-5, C5-6 ANTERIOR CERVICAL DISCECTOMY AND  FUSION    Pt seen with no visitor present. Pt resting in bed, in NAD. C-collar in place. Reports minimal post-op pain, controlled with tylenol alone     No significant changes:  BUE strength improved and numbness in rom hands has resolved, intermittent tingling   Reports RLE numbness from knee to foot is unchanged from prior to surgery, denies tingling   Reports knee pain over patella unchanged from admission, likely related to PTA injury    Continues to have issues with hard and soft c-collars \"too uncomfortable\". Refusing to wear collar while in bed, states she will wear with any activity including bed positioning. Continue to explain reasoning for collar      Keep surgical incision covered as she continues to touch neck. Reports swallowing improved. Tolerating majority of regular trays. Tolerating ensures. Strong voice unchanged from pre-op. Current smoker. Denies nausea. Multiple BMs since surgery. VSS Afebrile. Room air.      Visit Vitals  BP (!) 138/55   Pulse 97   Temp (!) 96.7 °F (35.9 °C)   Resp 19   Ht 5' 2\" (1.575 m)   Wt 136.1 kg (300 lb)   SpO2 95%   BMI 54.87 kg/m²       Voiding status: + void  Output (mL)  Urine Voided: 750 ml (02/01/22 0807)  Last Bowel Movement Date: 02/09/22 (02/11/22 0728)  Unmeasurable Output  Urine Occurrence(s): 4 (02/10/22 0606)  Stool Occurrence(s): 0 (02/08/22 1423)  Emesis Occurrence(s): 0 (02/08/22 1423)      Labs    Lab Results   Component Value Date/Time    HGB 11.7 02/11/2022 02:48 AM      Lab Results   Component Value Date/Time    INR 1.0 02/02/2022 10:22 AM      Lab Results   Component Value Date/Time    Sodium 139 02/11/2022 02:48 AM    Potassium 3.6 02/11/2022 02:48 AM    Chloride 106 02/11/2022 02:48 AM    CO2 30 02/11/2022 02:48 AM    Glucose 96 02/11/2022 02:48 AM    BUN 16 02/11/2022 02:48 AM    Creatinine 0.69 02/11/2022 02:48 AM    Calcium 8.7 02/11/2022 02:48 AM     Recent Glucose Results:   Lab Results   Component Value Date/Time    GLU 96 02/11/2022 02:48 AM       Body mass index is 54.87 kg/m². : A BMI > 30 is classified as obesity and > 40 is classified as morbid obesity. Incision check 2/7 - no drainage or swelling noted, mild erythema directly at incision line. Dressing c.d.i. HV Drain removed 2/4  Calves soft and supple; No pain with passive stretch  BUE 4+/5  LLE PF/DF 4+/5. Left hip flexor 4-/5. RLE minimal PF/DF. Negative EHL. Right hip flexor 2/5  Knee with scantbruise over patella. Lovenox for DVT proph     PLAN:  1) PT/OT - Continue to encourage c-collar   2) Knee pain - continue voltaren gel. Dr. Sayra Sam would like to avoid restarting Meloxicam /NSAIDs to avoid complications with surgical fusion. 3) Pain control - scheduled tylenol, prn oxycodone. Chloraseptic for sore throat. Bowel regimen changed to prn given multiple soft stools and little use of oxy. 4) RLE numbness and BUE tingling - started on gabapentin 300 mg TID 2/7. Tolerating dosing well. Continue at discharge  5) Smoking cessation - no desire to stop smoking, declined nicotine patch. Reviewed and aware of risk for decreased healing and increase risk of infection. 6) Dysphagia - seen by Speech Therapy - recommend continuing small bites and alternate with liquids to help clear food. Started on protonix for c/o acid reflux  7) Discharge planning - patient is a great candidate for IP Rehab to regain her recent loss of function due to severe spinal stenosis. Accepted by [facility], waiting bed availability. Discussed above with Dr Sayra Sam - patient is stable from neurosurgery standpoint and we will sign off at this time. Please reconsult if needed.      Horace Youngblood, SANTHOSH

## 2022-02-11 NOTE — PROGRESS NOTES
Problem: Self Care Deficits Care Plan (Adult)  Goal: *Acute Goals and Plan of Care (Insert Text)  Description: FUNCTIONAL STATUS PRIOR TO ADMISSION: Lives with her mother, who is able to care for herself, but can not take care of the patient. History of frequent falls due to her R knee \"giving out\". Sleeping on the sofa. She indicates that the can not use a RW in the house \"because there is not enough room for it. \" She reports having difficulty getting dressed; likely does not get dressed every day. HOME SUPPORT: Mother    Occupational Therapy Goals  2/4/2022:  Goals were not achieved. All Goals remain appropriate to continue for 7 days,. Initiated 1/29/2022  1. Patient will perform grooming standing up to 2 minutes with contact guard assist within 7 day(s). 2.  Patient will perform lower body dressing with minimal assistance using AE as needed within 7 day(s). 3.  Patient will perform toilet transfers with supervision/SBA within 7 day(s). 4.  Patient will perform all aspects of toileting with minimal assistance within 7 day(s). 5.  Patient will participate in upper extremity therapeutic exercise/activities with Min cues for 10 minutes within 7 day(s). 7 day reassess, 2/11/22    Initiated 1/29/2022  1. Patient will perform UB ADLs, and grooming sitting on EOB, with good balance, with in 7 day(s). 2.  Patient will perform lower body dressing with moderate assistance using AE as needed within 7 day(s). 3.  Patient will perform toilet transfers max of 2 with AD, within 7 day(s). 4.  Patient will perform all aspects of toileting with minimal assistance within 7 day(s). 5.  Patient will participate in upper extremity therapeutic exercise/activities with Min cues for 10 minutes within 7 day(s).    6, Patient will be able to perform supine<>sit with CGA, with in 7 days in prep for ADLs and functional transfers            Outcome: Progressing Towards Goal   OCCUPATIONAL THERAPY TREATMENT/WEEKLY RE-EVALUATION  Patient: Quang Urias (66 y.o. female)  Date: 2/11/2022  Diagnosis: Falls [W19. XXXA]  Inability to ambulate due to multiple joints [R26.2]  Cervical radiculopathy [M54.12]  Right knee pain [M25.561] <principal problem not specified>  Procedure(s) (LRB):  C3-4, C4-5, C5-6 ANTERIOR CERVICAL DISCECTOMY AND  FUSION  (N/A) 8 Days Post-Op  Precautions: Fall  Chart, occupational therapy assessment, plan of care, and goals were reviewed. ASSESSMENT  Based on the objective data described below, pt was supine in bed and OT worked with pt on donning Aspen collar, and she was mod assist to don, due to decreased fine motor coordination on the right UE. Pt was mod to min of 2 for supine <> sit. She was able to sit on EOb for approx 8-10 minutes and her sitting balance was good, and needed less VC and tactile cues to maintain her balance. She does sway as she is performing her ADLs sitting on EOb. Worked with pt on rolling in bed and she was min to mod of 2 to right and left side. Worked on bridging in bed in order to assist with changing linen/pads in bed. Pt is making good progress and left in bed with HOB elevated. .    Current Level of Function Impacting Discharge (ADLs): max for LB ADLs and min to setup for UB ADLs             PLAN :  Goals have been updated based on progression since last assessment. Patient continues to benefit from skilled intervention to address the above impairments. Continue to follow patient 4 times a week to address goals. Recommend with staff: in chair position in bed    Recommend next OT session: issue pt foam squares for hand/ strength, and hand out of ex.      Recommendation for discharge: (in order for the patient to meet his/her long term goals)  Therapy 3 hours per day 5-7 days per week      This discharge recommendation:  Has been made in collaboration with the attending provider and/or case management    Equipment recommendations for successful discharge (if) home: tbd       SUBJECTIVE:   Patient stated I don't understand why I still don't have feeling in my right leg. Floyd Arana    OBJECTIVE DATA SUMMARY:   Cognitive/Behavioral Status:  Neurologic State: Alert  Orientation Level: Oriented X4  Cognition: Follows commands  Perception: Appears intact  Perseveration: Perseverates during conversation  Safety/Judgement: Fall prevention    Functional Mobility and Transfers for ADLs:  Bed Mobility:  Rolling: Moderate assistance; Additional time; Adaptive equipment (use of bed railing)  Supine to Sit: Minimum assistance; Moderate assistance;Assist x2; Additional time; Adaptive equipment (use of bed railing)  Sit to Supine: Minimum assistance;Assist x2; Additional time; Adaptive equipment (use of bed railing)  Scooting: Maximum assistance    Transfers:      Mainor lift or stand assist, with max of        Balance:  Sitting: Impaired  Sitting - Static: Fair (occasional)  Sitting - Dynamic: Fair (occasional)    ADL Intervention:  Feeding  Feeding Assistance: Set-up  Container Management: Maximum assistance  Cutting Food: Maximum assistance  Utensil Management: Independent  Food to Mouth: Independent  Drink to Mouth: Independent    Grooming  Position Performed: Seated edge of bed  Washing Face: Set-up  Washing Hands: Set-up  Brushing Teeth: Set-up    Upper Body Bathing  Bathing Assistance: Minimum assistance;Supervision  Position Performed: Long sitting on bed    Lower Body Bathing  Bathing Assistance: Total assistance(dependent)  Perineal  : Total assistance (dependent)  Position Performed: Supine              Toileting  Toileting Assistance: Total assistance(dependent)  Bladder Hygiene: Total assistance (dependent)  Bowel Hygiene:  Total assistance (dependent)    Cognitive Retraining  Safety/Judgement: Fall prevention    Pain:  none    Activity Tolerance:   Fair    After treatment patient left in no apparent distress:   Supine in bed, Call bell within reach, and Bed / chair alarm activated    COMMUNICATION/COLLABORATION:   The patients plan of care was discussed with: Physical Therapist and Registered Nurse    Maximus Hernandes OT  Time Calculation: 31 mins

## 2022-02-12 PROCEDURE — 74011000250 HC RX REV CODE- 250: Performed by: NEUROLOGICAL SURGERY

## 2022-02-12 PROCEDURE — 65270000029 HC RM PRIVATE

## 2022-02-12 PROCEDURE — 94762 N-INVAS EAR/PLS OXIMTRY CONT: CPT

## 2022-02-12 PROCEDURE — 77030040393 HC DRSG OPTIFOAM GENT MDII -B

## 2022-02-12 PROCEDURE — 74011250637 HC RX REV CODE- 250/637: Performed by: NURSE PRACTITIONER

## 2022-02-12 PROCEDURE — 74011250637 HC RX REV CODE- 250/637: Performed by: INTERNAL MEDICINE

## 2022-02-12 PROCEDURE — 74011250636 HC RX REV CODE- 250/636: Performed by: STUDENT IN AN ORGANIZED HEALTH CARE EDUCATION/TRAINING PROGRAM

## 2022-02-12 PROCEDURE — 74011250637 HC RX REV CODE- 250/637: Performed by: NEUROLOGICAL SURGERY

## 2022-02-12 RX ADMIN — DICLOFENAC SODIUM 2 G: 10 GEL TOPICAL at 14:39

## 2022-02-12 RX ADMIN — Medication 1 AMPULE: at 21:58

## 2022-02-12 RX ADMIN — ACETAMINOPHEN 650 MG: 325 TABLET ORAL at 23:21

## 2022-02-12 RX ADMIN — GABAPENTIN 300 MG: 300 CAPSULE ORAL at 18:23

## 2022-02-12 RX ADMIN — ACETAMINOPHEN 650 MG: 325 TABLET ORAL at 17:34

## 2022-02-12 RX ADMIN — SODIUM CHLORIDE, PRESERVATIVE FREE 10 ML: 5 INJECTION INTRAVENOUS at 05:57

## 2022-02-12 RX ADMIN — GABAPENTIN 300 MG: 300 CAPSULE ORAL at 21:56

## 2022-02-12 RX ADMIN — DICLOFENAC SODIUM 2 G: 10 GEL TOPICAL at 21:56

## 2022-02-12 RX ADMIN — Medication 1 AMPULE: at 08:54

## 2022-02-12 RX ADMIN — LEVOTHYROXINE SODIUM 75 MCG: 0.07 TABLET ORAL at 05:55

## 2022-02-12 RX ADMIN — ENOXAPARIN SODIUM 30 MG: 100 INJECTION SUBCUTANEOUS at 21:56

## 2022-02-12 RX ADMIN — DICLOFENAC SODIUM 2 G: 10 GEL TOPICAL at 17:35

## 2022-02-12 RX ADMIN — Medication: at 17:35

## 2022-02-12 RX ADMIN — ACETAMINOPHEN 650 MG: 325 TABLET ORAL at 05:55

## 2022-02-12 RX ADMIN — PANTOPRAZOLE SODIUM 40 MG: 40 TABLET, DELAYED RELEASE ORAL at 08:45

## 2022-02-12 RX ADMIN — Medication: at 21:56

## 2022-02-12 RX ADMIN — Medication: at 10:19

## 2022-02-12 RX ADMIN — ACETAMINOPHEN 650 MG: 325 TABLET ORAL at 12:34

## 2022-02-12 RX ADMIN — SODIUM CHLORIDE, PRESERVATIVE FREE 10 ML: 5 INJECTION INTRAVENOUS at 21:56

## 2022-02-12 RX ADMIN — DICLOFENAC SODIUM 2 G: 10 GEL TOPICAL at 08:54

## 2022-02-12 RX ADMIN — ENOXAPARIN SODIUM 30 MG: 100 INJECTION SUBCUTANEOUS at 08:44

## 2022-02-12 RX ADMIN — GABAPENTIN 300 MG: 300 CAPSULE ORAL at 08:45

## 2022-02-12 RX ADMIN — SODIUM CHLORIDE, PRESERVATIVE FREE 10 ML: 5 INJECTION INTRAVENOUS at 14:39

## 2022-02-12 NOTE — PROGRESS NOTES
End of Shift Note    Bedside shift change report given to Gini Chavez RN  (oncoming nurse) by Rosendo Stephen RN (offgoing nurse). Report included the following information SBAR, Kardex, Procedure Summary, Intake/Output, MAR and Recent Results    Shift worked: days   Shift summary and any significant changes:    Pt pending placement. Concerns for physician to address:  none     Zone phone for oncoming shift:  [id number]     Activity:  Activity Level: Bed Rest  Number times ambulated in hallways past shift: 0  Number of times OOB to chair past shift: 0    Cardiac:   Cardiac Monitoring: No      Cardiac Rhythm:  (tachy)    Access:   Current line(s): PIV     Genitourinary:   Urinary status: voiding and incontinent    Respiratory:   O2 Device: None (Room air)  Chronic home O2 use?: NO  Incentive spirometer at bedside: YES  Actual Volume (ml): 800 ml  GI:  Last Bowel Movement Date: 02/09/22  Current diet:  ADULT ORAL NUTRITION SUPPLEMENT Breakfast, Lunch, Dinner; Low Calorie/High Protein  ADULT DIET Dysphagia - Soft & Bite Sized; Chocolate Ensure high protein preferred  Passing flatus: YES  Tolerating current diet: YES       Pain Management:   Patient states pain is manageable on current regimen: YES    Skin:  Tru Score: 17  Interventions: float heels, increase time out of bed, foam dressing, PT/OT consult, limit briefs and nutritional support     Patient Safety:  Fall Score:  Total Score: 4  Interventions: bed/chair alarm, assistive device (walker, cane, etc), gripper socks, pt to call before getting OOB and stay with me (per policy)  High Fall Risk: Yes    Length of Stay:  Expected LOS: 2d 12h  Actual LOS: 11      Rosendo Stephen RN

## 2022-02-12 NOTE — PROGRESS NOTES
Transition of Care Plan:     RUR: 11% - \"low risk\"     Disposition: IPR placement - [facility]     (pending bed availability - auth expires 2/22/22)    10:22 AM  [facility] Jonathan Peck ([phone]) - CM called and talked to Lin and she states she does not think they have a bed today but they are still working through possible list today. Lin stated she will call us back if she has a bed to offer. Follow up appointments: Maryjo Doss indicated    DME needed: Defer to IPR facility for DME needs   Transportation at Discharge: [facility] on will call; PCS to be completed & placed on chart  Keys or means to access home: N/A - pt transitioning to IPR at d/c   Medicare Letter: N/A - Medicaid coverage   Is patient a BCPI-A Bundle: N/A     Is patient a [place] and connected with the [facility]? N/A               Caregiver Contact: Pt's mother Caroline Winters Micky: [phone])  Discharge Caregiver contacted prior to discharge? Pt/family are aware of delay of waiting for bed at [facility]. CM will notify them prior to DC. Care Conference needed?: TBD    CM will continue to monitor discharge plan. Tre, Weekend CM  Ext [phone]    CM will continue to monitor discharge plan.

## 2022-02-12 NOTE — PROGRESS NOTES
Hospitalist Progress Note    NAME: Larisa Vanegas   :  1980   MRN:  [id number]       Assessment / Plan:  Severe Cervical Spinal stenosis with Radiculopathy POA  Myelopathy with spinal cord compression  S/p anterior cervical discectomy decompression and fusionC3-C4, C4-C5 and C5-C6-2/3. Right knee pain POA  Recurrent falls. Physical deconditioning  History of arthritis  H/o L ankle Fracture s/p ORIF at [facility] ortho  Hypothyroidism (newly diagnosed) POA  Morbid obesity POA  Tobacco abuse     MRI C Spine=  1. Disc protrusions C4-C5 and C5-C6, with severe canal stenosis and cord  flattening. 2. Probable abnormal cord signal C4-C5. Possible abnormal cord signal C5-C6. 3. Moderate canal stenosis C3-C4. 4. Neural foraminal stenoses: bilateral C3-C4, right and severe left C4-C5, left  C5-C6.     Pt was under observation stay- changed to IP  after 2 [place] stay - failed outpatient management/treatment  Cont PO oxycodone prn severe pain only- avoid as able  Cont falls precautions  IP PT OT consult noted-- recommends IPR  IP  consulted- working on [employer] placement  IP Ortho consult appreciated-   - Generalized bony knee pain due to overload - NO KNEE intervention recommended  -  Wt loss through dietary changes   - Rehab for upper and lower body strengthening  Neuro eval for foot drop appreciated. Neurosurgery on boards/p S/p anterior cervical discectomy decompression and fusionC3-C4, C4-C5 and C5-C6-2/3. Dysphagia:  Post operative  Neurosurgery mentions that this is not unusal for patients to have some difficulty swallowing especially after undergoing multilevel discectomy  Mentions its getting better. Dysphagia improving  Xray C spine:  Recent anterior cervical fusion with prevertebral soft tissue  swelling as above. SLP consulted.     Acute on chronic anemia , most likely post op blood loss   Hb 13.6 on  , trended down to 10.6, repeat hemoglobin is 11.7 today  Iron panel is within normal limits  Hypothyroidism:    TSH = 11.3, started on Synthroid daily, repeat TSH is around four, will increase Synthroid    Active smoker:  Counselled on cessation  Patient declined nicotine patch.     Dispositionpatient medically stable for discharge, PT recommended rehab     Code Status: Full code  Surrogate Decision Maker: Mother 79 yrs old at home who cant help physically     DVT Prophylaxis: Lovenox  GI Prophylaxis: not indicated     Baseline: Ambulatory     Estimated discharge date: Medically ready  Barriers: Dispositioninpatient rehab     Recommended Disposition: [facility]/Rehab     Subjective:     Chief Complaint / Reason for Physician Visit  Follow-up cervical stenosis status post anterior cervical discectomy decompression  Feels better,  Review of Systems:  Symptom Y/N Comments  Symptom Y/N Comments   Fever/Chills n   Chest Pain n    Poor Appetite n   Edema n    Cough    Abdominal Pain n    Sputum    Joint Pain     SOB/BECKETT    Pruritis/Rash     Nausea/vomit    Tolerating PT/OT     Diarrhea    Tolerating Diet     Constipation    Other       Could NOT obtain due to:      Objective:     VITALS:   Last 24hrs VS reviewed since prior progress note. Most recent are:  Patient Vitals for the past 24 hrs:   Temp Pulse Resp BP SpO2   02/12/22 0801 98.3 °F (36.8 °C) 93 18 (!) 116/56 93 %   02/12/22 0401 98 °F (36.7 °C) 96 18 122/61 93 %   02/11/22 2017 97.9 °F (36.6 °C) 81 18 (!) 139/59 94 %   02/11/22 1413 97.5 °F (36.4 °C) 90 18 (!) 154/62 97 %   02/11/22 1403 97.1 °F (36.2 °C) 90 16 (!) 161/83 96 %     No intake or output data in the 24 hours ending 02/12/22 4825     I had a face to face encounter and independently examined this patient on 2/12/2022, as outlined below:  PHYSICAL EXAM:  General: WD, WN. Alert, cooperative, no acute distress    EENT:  EOMI. Anicteric sclerae. MMM, Incision site at lower neck covered with dressing, is dry  Resp:  CTA bilaterally, no wheezing or rales.   No accessory muscle use  CV:  Regular  rhythm,  No edema  GI:  Soft, Non distended, Non tender. +Bowel sounds  Neurologic:  Alert and oriented X 3, normal speech,   Psych:   Good insight. Not anxious nor agitated  Skin:  No rashes. No jaundice    Reviewed most current lab test results and cultures  YES  Reviewed most current radiology test results   YES  Review and summation of old records today    NO  Reviewed patient's current orders and MAR    YES  PMH/SH reviewed - no change compared to H&P  ________________________________________________________________________  Care Plan discussed with:    Comments   Patient y    Family      RN y    Care Manager     Consultant                        Multidiciplinary team rounds were held today with , nursing, pharmacist and clinical coordinator. Patient's plan of care was discussed; medications were reviewed and discharge planning was addressed. ________________________________________________________________________  Total NON critical care TIME: 30  Minutes    Total CRITICAL CARE TIME Spent:   Minutes non procedure based      Comments   >50% of visit spent in counseling and coordination of care     ________________________________________________________________________  Nataly Bloom MD     Procedures: see electronic medical records for all procedures/Xrays and details which were not copied into this note but were reviewed prior to creation of Plan. LABS:  I reviewed today's most current labs and imaging studies.   Pertinent labs include:  Recent Labs     02/11/22 0248   WBC 9.6   HGB 11.7   HCT 36.9        Recent Labs     02/11/22 0248      K 3.6      CO2 30   GLU 96   BUN 16   CREA 0.69   CA 8.7       Signed: Nataly Bloom MD

## 2022-02-12 NOTE — PROGRESS NOTES
End of Shift Note     Bedside shift change report given to Kadie  (oncoming nurse) by Kelsi Rogers RN (offgoing nurse).  Report included the following information SBAR, Kardex, Procedure Summary, Intake/Output, MAR and Recent Results     Shift worked: 7ampm-7pm       Shift summary and any significant changes:     Tolerates her diet      Concerns for physician to address:  none      Zone phone for oncoming shift:   [phone]         Activity:  Activity Level: Bed Rest  Number times ambulated in hallways past shift: 0  Number of times OOB to chair past shift: 1     Cardiac:   Cardiac Monitoring: No      Cardiac Rhythm: Sinus Rhythm     Access:   Current line(s): PIV      Genitourinary:   Urinary status: voiding and incontinent     Respiratory:   O2 Device: None (Room air)  Chronic home O2 use?: NO  Incentive spirometer at bedside: YES  Actual Volume (ml): 800 ml  GI:  Last Bowel Movement Date: 02/07/22  Current diet:  DIET ONE TIME MESSAGE  ADULT ORAL NUTRITION SUPPLEMENT Breakfast, Lunch, Dinner; Low Calorie/High Protein  DIET ONE TIME MESSAGE  DIET ONE TIME MESSAGE  ADULT DIET Dysphagia - Soft & Bite Sized  Passing flatus: YES  Tolerating current diet: YES     Pain Management:   Patient states pain is manageable on current regimen: YES     Skin:  Tru Score: 12  Interventions: float heels, increase time out of bed, foam dressing, PT/OT consult, limit briefs and nutritional support     Patient Safety:  Fall Score: Total Score: 4  Interventions: bed/chair alarm, assistive device (walker, cane, etc), gripper socks, pt to call before getting OOB and stay with me (per policy)  High Fall Risk:  Yes     Length of Stay:  Expected LOS: 2d 12h  Actual LOS: 9        Powell Krabbe, RN                                                                         Revision History

## 2022-02-12 NOTE — PROGRESS NOTES
End of Shift Note    Bedside shift change report given to Lissa Bedoya RN  (oncoming nurse) by Solomon Bautista RN (offgoing nurse). Report included the following information SBAR, Kardex, Procedure Summary, Intake/Output, MAR and Recent Results    Shift worked: Nights    Shift summary and any significant changes:    Pt in bed sleeping most of the nights, no change to condition. Bathed and gown changed. Concerns for physician to address:  none     Zone phone for oncoming shift:  [phone]     Activity:  Activity Level: Bed Rest  Number times ambulated in hallways past shift: 0  Number of times OOB to chair past shift: 0    Cardiac:   Cardiac Monitoring: No      Cardiac Rhythm:  (tachy)    Access:   Current line(s): PIV     Genitourinary:   Urinary status: voiding and incontinent    Respiratory:   O2 Device: None (Room air)  Chronic home O2 use?: NO  Incentive spirometer at bedside: YES  Actual Volume (ml): 800 ml  GI:  Last Bowel Movement Date: 02/11/22  Current diet:  ADULT ORAL NUTRITION SUPPLEMENT Breakfast, Lunch, Dinner; Low Calorie/High Protein  ADULT DIET Dysphagia - Soft & Bite Sized; Chocolate Ensure high protein preferred  Passing flatus: YES  Tolerating current diet: YES       Pain Management:   Patient states pain is manageable on current regimen: YES    Skin:  Tru Score: 17  Interventions: float heels, increase time out of bed, foam dressing, PT/OT consult, limit briefs and nutritional support     Patient Safety:  Fall Score:  Total Score: 4  Interventions: bed/chair alarm, assistive device (walker, cane, etc), gripper socks, pt to call before getting OOB and stay with me (per policy)  High Fall Risk: Yes    Length of Stay:  Expected LOS: 2d 12h  Actual LOS: 12      Solomon Bautista, RN

## 2022-02-13 VITALS
RESPIRATION RATE: 18 BRPM | WEIGHT: 293 LBS | HEART RATE: 83 BPM | TEMPERATURE: 98.2 F | SYSTOLIC BLOOD PRESSURE: 119 MMHG | DIASTOLIC BLOOD PRESSURE: 68 MMHG | OXYGEN SATURATION: 97 % | HEIGHT: 62 IN | BODY MASS INDEX: 53.92 KG/M2

## 2022-02-13 PROCEDURE — 74011250636 HC RX REV CODE- 250/636: Performed by: STUDENT IN AN ORGANIZED HEALTH CARE EDUCATION/TRAINING PROGRAM

## 2022-02-13 PROCEDURE — 74011250637 HC RX REV CODE- 250/637: Performed by: NURSE PRACTITIONER

## 2022-02-13 PROCEDURE — 74011250637 HC RX REV CODE- 250/637: Performed by: INTERNAL MEDICINE

## 2022-02-13 PROCEDURE — 74011250637 HC RX REV CODE- 250/637: Performed by: NEUROLOGICAL SURGERY

## 2022-02-13 PROCEDURE — 74011000250 HC RX REV CODE- 250: Performed by: NEUROLOGICAL SURGERY

## 2022-02-13 RX ORDER — MELOXICAM 7.5 MG/1
7.5 TABLET ORAL DAILY
Qty: 10 TABLET | Refills: 0 | Status: SHIPPED
Start: 2022-02-13 | End: 2022-02-23

## 2022-02-13 RX ORDER — OXYCODONE HYDROCHLORIDE 5 MG/1
5 TABLET ORAL
Qty: 12 TABLET | Refills: 0 | Status: SHIPPED | OUTPATIENT
Start: 2022-02-13 | End: 2022-02-16

## 2022-02-13 RX ORDER — PANTOPRAZOLE SODIUM 40 MG/1
40 TABLET, DELAYED RELEASE ORAL
Qty: 30 TABLET | Refills: 0 | Status: SHIPPED
Start: 2022-02-13 | End: 2022-03-15

## 2022-02-13 RX ORDER — LEVOTHYROXINE SODIUM 75 UG/1
75 TABLET ORAL
Qty: 30 TABLET | Refills: 1 | Status: SHIPPED
Start: 2022-02-14 | End: 2022-04-15

## 2022-02-13 RX ORDER — GABAPENTIN 300 MG/1
300 CAPSULE ORAL 3 TIMES DAILY
Qty: 90 CAPSULE | Refills: 0 | Status: SHIPPED | OUTPATIENT
Start: 2022-02-13 | End: 2022-03-15

## 2022-02-13 RX ORDER — IBUPROFEN 200 MG
1 TABLET ORAL DAILY
Qty: 30 PATCH | Refills: 0 | Status: SHIPPED | OUTPATIENT
Start: 2022-02-13 | End: 2022-02-13

## 2022-02-13 RX ORDER — CYCLOBENZAPRINE HCL 10 MG
10 TABLET ORAL
Qty: 30 TABLET | Refills: 0 | Status: SHIPPED
Start: 2022-02-13 | End: 2022-02-23

## 2022-02-13 RX ADMIN — DICLOFENAC SODIUM 2 G: 10 GEL TOPICAL at 08:56

## 2022-02-13 RX ADMIN — PANTOPRAZOLE SODIUM 40 MG: 40 TABLET, DELAYED RELEASE ORAL at 08:55

## 2022-02-13 RX ADMIN — DICLOFENAC SODIUM 2 G: 10 GEL TOPICAL at 13:18

## 2022-02-13 RX ADMIN — LEVOTHYROXINE SODIUM 75 MCG: 0.07 TABLET ORAL at 05:14

## 2022-02-13 RX ADMIN — ACETAMINOPHEN 650 MG: 325 TABLET ORAL at 05:15

## 2022-02-13 RX ADMIN — GABAPENTIN 300 MG: 300 CAPSULE ORAL at 08:55

## 2022-02-13 RX ADMIN — SODIUM CHLORIDE, PRESERVATIVE FREE 10 ML: 5 INJECTION INTRAVENOUS at 05:14

## 2022-02-13 RX ADMIN — GABAPENTIN 300 MG: 300 CAPSULE ORAL at 18:08

## 2022-02-13 RX ADMIN — Medication: at 13:18

## 2022-02-13 RX ADMIN — ACETAMINOPHEN 650 MG: 325 TABLET ORAL at 13:17

## 2022-02-13 RX ADMIN — Medication 1 AMPULE: at 08:56

## 2022-02-13 RX ADMIN — DICLOFENAC SODIUM 2 G: 10 GEL TOPICAL at 18:09

## 2022-02-13 RX ADMIN — ENOXAPARIN SODIUM 30 MG: 100 INJECTION SUBCUTANEOUS at 08:55

## 2022-02-13 RX ADMIN — Medication: at 18:09

## 2022-02-13 RX ADMIN — ACETAMINOPHEN 650 MG: 325 TABLET ORAL at 18:08

## 2022-02-13 NOTE — DISCHARGE SUMMARY
Hospitalist Discharge Summary     Patient ID:  Khushbu Monahan  [id number]  7 y.o.  1980  1/28/2022    PCP on record: Charito Pavon MD    Admit date: 1/28/2022  Discharge date and time: 2/13/2022    DISCHARGE DIAGNOSIS:  Severe Cervical Spinal stenosis with Radiculopathy POA  Myelopathy with spinal cord compression  S/p anterior cervical discectomy decompression and fusionC3-C4, C4-C5 and C5-C6-2/3. Right knee pain POA  Recurrent falls. Physical deconditioning  History of arthritis  H/o L ankle Fracture s/p ORIF at [facility] ortho  Hypothyroidism (newly diagnosed) POA  Morbid obesity POA  Tobacco abuse       CONSULTATIONS:  IP CONSULT TO ORTHOPEDIC SURGERY  IP CONSULT TO NEUROLOGY  IP CONSULT TO NEUROSURGERY    Excerpted HPI from H&P of Ricky Novak MD:  CHIEF COMPLAINT: Recurrent falls and right knee pain     HISTORY OF PRESENT ILLNESS:     Khushbu Monahan is a 42 y.o.  female history of tobacco abuse, arthritis who presents with complaint of right knee pain and ambulatory dysfunction, status post recurrent falls. Patient reported having right knee pain for the last 5 months, has been getting gradually worse leading to difficulty getting up to perform her ADL, lately has been difficult to get up to go to the bathroom. Also reported recurrent falls, has had for falls in total.  She said her right knee just gives out , then she falls. Also reported numbness and tingling sensation of lower extremity. She lives with her 80-year-old mom who cannot help her.   She is being admitted for PT OT evaluation and possible rehab placement  Vital signs are stable, labs unremarkable  Right knee x-ray showed no fracture  We were asked to admit for work up and evaluation of the above problems.      No past medical history on file.      No past surgical history on file.       ______________________________________________________________________  DISCHARGE SUMMARY/HOSPITAL COURSE:  for full details see H&P, daily progress notes, labs, consult notes. Severe Cervical Spinal stenosis with Radiculopathy POA  Myelopathy with spinal cord compression  S/p anterior cervical discectomy decompression and fusionC3-C4, C4-C5 and C5-C6-2/3. Right knee pain POA  Recurrent falls. Physical deconditioning  History of arthritis  H/o L ankle Fracture s/p ORIF at [facility] ortho  Hypothyroidism (newly diagnosed) POA  Morbid obesity POA  Tobacco abuse      MRI C Spine=  1. Disc protrusions C4-C5 and C5-C6, with severe canal stenosis and cord  flattening. 2. Probable abnormal cord signal C4-C5. Possible abnormal cord signal C5-C6. 3. Moderate canal stenosis C3-C4. 4. Neural foraminal stenoses: bilateral C3-C4, right and severe left C4-C5, left  C5-C6.     Pt was under observation stay- changed to IP 1/31 after 2 [place] stay - failed outpatient management/treatment  Cont PO oxycodone prn severe pain only  Cont falls precautions  IP PT OT consult noted-- recommends IPR  IP Ortho consult appreciated-   - Generalized bony knee pain due to overload - NO KNEE intervention recommended  -  Wt loss through dietary changes   - Rehab for upper and lower body strengthening  Neuro eval for foot drop appreciated. Neurosurgery on boards/p S/p anterior cervical discectomy decompression and fusionC3-C4, C4-C5 and C5-C6-2/3.     Dysphagia:  Post operative  Neurosurgery mentions that this is not unusal for patients to have some difficulty swallowing especially after undergoing multilevel discectomy  Mentions its getting better. Dysphagia improving  Xray C spine:  Recent anterior cervical fusion with prevertebral soft tissue  swelling as above.   SLP consulted.     Acute on chronic anemia , most likely post op blood loss   Hb 13.6 on 01/29 , trended down to 10.6, repeat hemoglobin is 11.7   Iron panel is within normal limits  Hypothyroidism:     TSH = 11.3, started on Synthroid daily, repeat TSH is around four, synthroid increased to 75mg      Active smoker:  Counselled on cessation  Patient declined nicotine patch          _______________________________________________________________________  Patient seen and examined by me on discharge day. Pertinent Findings:  Gen:    Not in distress  Chest: Clear lungs  CVS:   Regular rhythm. No edema  Abd:  Soft, not distended, not tender  Neuro:  Alert,   _______________________________________________________________________  DISCHARGE MEDICATIONS:   Current Discharge Medication List      START taking these medications    Details   cyclobenzaprine (FLEXERIL) 10 mg tablet Take 1 Tablet by mouth three (3) times daily as needed for Muscle Spasm(s) for up to 10 days. Qty: 30 Tablet, Refills: 0  Start date: 2/13/2022, End date: 2/23/2022      gabapentin (NEURONTIN) 300 mg capsule Take 1 Capsule by mouth three (3) times daily for 30 days. Max Daily Amount: 900 mg. Qty: 90 Capsule, Refills: 0  Start date: 2/13/2022, End date: 3/15/2022    Associated Diagnoses: S/P cervical spinal fusion; Cervical radiculopathy      levothyroxine (SYNTHROID) 75 mcg tablet Take 1 Tablet by mouth every morning for 60 days. Qty: 30 Tablet, Refills: 1  Start date: 2/14/2022, End date: 4/15/2022      meloxicam (MOBIC) 7.5 mg tablet Take 1 Tablet by mouth daily for 10 days. Qty: 10 Tablet, Refills: 0  Start date: 2/13/2022, End date: 2/23/2022      oxyCODONE IR (ROXICODONE) 5 mg immediate release tablet Take 1 Tablet by mouth every six (6) hours as needed for Pain for up to 3 days. Max Daily Amount: 20 mg.  Qty: 12 Tablet, Refills: 0  Start date: 2/13/2022, End date: 2/16/2022    Associated Diagnoses: S/P cervical spinal fusion      pantoprazole (PROTONIX) 40 mg tablet Take 1 Tablet by mouth Daily (before breakfast) for 30 days. Qty: 30 Tablet, Refills: 0  Start date: 2/13/2022, End date: 3/15/2022      polyethylene glycol (MIRALAX) 17 gram packet Take 1 Packet by mouth daily as needed (constipation) for up to 15 days.   Qty: 15 Packet, Refills: 0  Start date: 2/4/2022, End date: 2/19/2022      senna-docusate (PERICOLACE) 8.6-50 mg per tablet Take 1 Tablet by mouth daily. Qty: 30 Tablet, Refills: 0  Start date: 2/4/2022         STOP taking these medications       ibuprofen (MOTRIN) 600 mg tablet Comments:   Reason for Stopping:                 Patient Follow Up Instructions:    Activity: Activity as tolerated  Diet:, dysphagia diet soft bite   Wound Care: As directed        Follow-up Information     Follow up With Specialties Details Why Contact Raffaele Petersen MD Neurosurgery Schedule an appointment as soon as possible for a visit in 2 weeks  [facility] [address]      [facility], [address]   [place] [address]  [phone]          ________________________________________________________________    Risk of deterioration: Moderate    Condition at Discharge:  Stable  __________________________________________________________________    Disposition  IP Rehab    ____________________________________________________________________    Code Status: Full Code  ___________________________________________________________________      Total time in minutes spent coordinating this discharge (includes going over instructions, follow-up, prescriptions, and preparing report for sign off to her PCP) :  >30 minutes    Signed:  Teressa Giordano MD

## 2022-02-13 NOTE — PROGRESS NOTES
UPDATE 3:21 PM - Call received from [facility]. New ETA is 6:00 PM. RN informed. Transition of Care Plan:     RUR: 11% - \"low risk\"   Disposition: IPR placement - [facility] yogesh Cabrera (Medicaid auth approved, expires 2/22/22)  Follow up appointments: Gm Lantigua indicated    DME needed: Defer to IPR facility for DME needs   Transportation at [address] pick-up at [facility]), PCS on chart  Keys or means to access home: N/A - pt transitioning to IPR at d/c  IM Medicare Letter: N/A - Medicaid coverage   Is patient a BCPI-A Bundle: N/A     Is patient a [place] and connected with the [place]? N/A               Caregiver Contact: Pt's mother Aisha Alu [phone])  Discharge Caregiver contacted prior to [address] needed?: No    [facility] liaison, Deyvi Anand ([phone]), informed CM of a bed available for pt today. [facility] scheduled for 2:00 PM (soonest available). PCS placed on chart. EMTALA info below. Bed Assignment: Room 101  Report #: [id number]  Accepting physician: Dr. Miquel Frankel    Family has been updated and verbalized understanding of the plan. No further concerns indicated at this time. AVS updated. Patient is ready for discharge from a Care Management standpoint. RN informed. Care Management Interventions  PCP Verified by CM:  Yes  Palliative Care Criteria Met (RRAT>21 & CHF Dx)?: No  Mode of Transport at Discharge: [facility] Transport Time of Discharge: 1400  Transition of Care Consult (CM Consult): Acute Rehab  MyChart Signup: No  Discharge Durable Medical Equipment: No  Physical Therapy Consult: Yes  Occupational Therapy Consult: Yes  Speech Therapy Consult: Yes  Support Systems: Parent(s)  Confirm Follow Up Transport: Family  The Plan for Transition of Care is Related to the Following Treatment Goals : IPR  The Patient and/or Patient Representative was Provided with a Choice of Provider and Agrees with the Discharge Plan?: Yes  Name of the Patient Representative Who was Provided with a Choice of Provider and Agrees with the Discharge Plan: Pt  Freedom of Choice List was Provided with Basic Dialogue that Supports the Patient's Individualized Plan of Care/Goals, Treatment Preferences and Shares the Quality Data Associated with the Providers?: Yes  Discharge Location  Patient Expects to be Discharged to[de-identified] Rehab hospital/unit acute      Karoline Rincon, [facility] Manager [facility]  [phone]

## 2022-02-13 NOTE — PROGRESS NOTES
Problem: Falls - Risk of  Goal: *Absence of Falls  Description: Document Gini Mariater Fall Risk and appropriate interventions in the flowsheet.   Note: Fall Risk Interventions:  Mobility Interventions: Bed/chair exit alarm,Patient to call before getting OOB,PT Consult for mobility concerns    Mentation Interventions: Adequate sleep, hydration, pain control,Bed/chair exit alarm,Door open when patient unattended,More frequent rounding    Medication Interventions: Bed/chair exit alarm,Patient to call before getting OOB    Elimination Interventions: Bed/chair exit alarm,Call light in reach    History of Falls Interventions: Bed/chair exit alarm

## 2022-02-13 NOTE — PROGRESS NOTES
End of Shift Note     Bedside shift change report given to GERMÁN Mathias (oncoming nurse) by Esequiel Christina RN (offgoing nurse).  Report included the following information SBAR, Kardex, Procedure Summary, Intake/Output, MAR and Recent Results     Shift worked: Nights       Shift summary and any significant changes:     No significant changes, no complaints of pain. In bed sleeping most of the night. Concerns for physician to address:  none      Zone phone for oncoming shift:   [phone]         Activity:  Activity Level: Bed Rest  Number times ambulated in hallways past shift: 0  Number of times OOB to chair past shift: 1     Cardiac:   Cardiac Monitoring: No      Cardiac Rhythm: Sinus Rhythm     Access:   Current line(s): PIV      Genitourinary:   Urinary status: voiding and incontinent     Respiratory:   O2 Device: None (Room air)  Chronic home O2 use?: NO  Incentive spirometer at bedside: YES  Actual Volume (ml): 800 ml  GI:  Last Bowel Movement Date: 02/07/22  Current diet:  DIET ONE TIME MESSAGE  ADULT ORAL NUTRITION SUPPLEMENT Breakfast, Lunch, Dinner; Low Calorie/High Protein  DIET ONE TIME MESSAGE  DIET ONE TIME MESSAGE  ADULT DIET Dysphagia - Soft & Bite Sized  Passing flatus: YES  Tolerating current diet: YES     Pain Management:   Patient states pain is manageable on current regimen: YES     Skin:  Tru Score: 12  Interventions: float heels, increase time out of bed, foam dressing, PT/OT consult, limit briefs and nutritional support     Patient Safety:  Fall Score: Total Score: 4  Interventions: bed/chair alarm, assistive device (walker, cane, etc), gripper socks, pt to call before getting OOB and stay with me (per policy)  High Fall Risk:  Yes     Length of Stay:  Expected LOS: 2d 12h  Actual LOS: 9        Juanita Huerta RN                                                                         Revision History

## 2022-02-19 NOTE — CONSULTS
[facility]    Name:  Moi Almazan  MR#:  [id number]  :  1980  ACCOUNT #:  [de-identified]  DATE OF SERVICE:  2022      REASON FOR CONSULTATION:  Spinal cord compression. HISTORY OF PRESENT ILLNESS:  This is a 50-year-old woman who was admitted to the hospital on 2022 for weakness. She said that she was having right knee pain for five months with increasing difficulty walking, but then over the past week prior to her admission, she had difficulty getting up from the couch to eat and go to the restroom. She had had multiple falls. She noted that her right knee would give out. She has numbness and tingling in both of her lower extremities. She lives with her 75-year-old mother who is unable to lift her up off the floor. She was brought to the emergency room several days prior to admission as she had fallen off the couch and her mother was unable to get her up. At that point, she had increasing knee pain. She was discharged home and then returned again to the emergency room on . She was admitted to the medical service. She also noted after she was admitted she was having increasing difficulty with her hands. She was continuously dropping items. Workup during her admission included an MRI of the cervical spine that showed severe spinal stenosis and I was asked to see her in consultation. PAST MEDICAL HISTORY:  Morbid obesity, hypothyroidism, GERD. PAST SURGICAL HISTORY:  Ankle fracture. FAMILY HISTORY:  Denies family history of diabetes or stroke. SOCIAL HISTORY:  She smokes quarter pack cigarettes per day. Occasionally uses alcohol. She is not . Unemployed. ALLERGIES:  NO KNOWN DRUG ALLERGIES. MEDICATIONS DURING ADMISSION:  1. Flexeril 10 mg t.i.d.  2.  Neurontin 300 mg t.i.d.  3.  Synthroid 75 mcg daily. 4.  Mobic 7.5 mg daily. 5.  Protonix 40 mg daily. 6.  MiraLax 17 g daily.   6.  Marivel-Colace 1 daily.    REVIEW OF SYSTEMS:  Denies headache, vision changes, hearing difficulty, chest pain, shortness of breath, abdominal pain, urinary dysfunction. Positive for numbness in her hands and legs. Positive for weakness in her hands and legs. Denies any skin eruptions. PHYSICAL EXAMINATION:  VITAL SIGNS:  Weight 300 pounds, height 5 feet 2 inches, BMI is 55. Temperature 98.2, blood pressure 118/65, pulse 92. NEUROLOGIC:  This is a well-developed, obese woman who is examined lying in the hospital bed. She is alert. She is oriented. Normal affect. Fluent speech. Conjugate gaze. Symmetric face. She has full strength in bilateral deltoids, biceps, triceps, but she is markedly in so her wrist extensors and hand intrinsics. She is unable to lift her legs off the bed with a 4+/5 strength distally bilateral lower extremities, essentially decreased more so on the left than the right to light touch. She is markedly hyperreflexic with positive Torres sign and clonus. IMAGING STUDIES:  She has an MRI of the cervical spine performed on 01/31/2022, which showed a large disk protrusion causing severe canal stenosis and cord flattening at C4-C5, C5-C6 and to a lesser degree at C3-C4. ASSESSMENT AND PLAN:  This is a 76-year-old woman who has severe myelopathy and spinal cord compression. I discussed with her risks and benefits of cervical decompression and fusion. She understands. She has agreed to proceed. We will schedule her for surgery. Thank you for this consultation.       MD JULES Blount/[id number]  D:  02/18/2022 16:51  T:  02/18/2022 21:45  JOB #:  [id number]

## 2022-03-14 ENCOUNTER — TELEPHONE (OUTPATIENT)
Dept: CASE MANAGEMENT | Age: 42
End: 2022-03-14

## 2022-03-14 NOTE — TELEPHONE ENCOUNTER
Received call from pt's mother inquiring about contact information for orthopedic follow up. Pt had lost her paperwork after discharge. Pt recently discharged from [address] ([facility]) and did not have an appointment made. CM provided pt's mother with contact number for Dr. Dave Stiles which was listed in pt's AVS as needed follow up.     RANCHO Chung   Care Manager, [facility]  [phone]

## 2022-03-18 PROBLEM — M54.12 CERVICAL RADICULOPATHY: Status: ACTIVE | Noted: 2022-01-31

## 2022-03-19 PROBLEM — M25.561 RIGHT KNEE PAIN: Status: ACTIVE | Noted: 2022-01-31
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
## 2022-03-20 PROBLEM — R26.2 INABILITY TO AMBULATE DUE TO MULTIPLE JOINTS: Status: ACTIVE | Noted: 2022-01-31

## 2022-05-13 ENCOUNTER — HOSPITAL ENCOUNTER (EMERGENCY)
Age: 42
Discharge: HOME | End: 2022-05-14
Attending: EMERGENCY MEDICINE
Payer: MEDICAID

## 2022-05-13 VITALS
RESPIRATION RATE: 18 BRPM | OXYGEN SATURATION: 97 % | HEART RATE: 96 BPM | DIASTOLIC BLOOD PRESSURE: 91 MMHG | SYSTOLIC BLOOD PRESSURE: 103 MMHG

## 2022-05-13 DIAGNOSIS — F17.200 TOBACCO DEPENDENCE: ICD-10-CM

## 2022-05-13 DIAGNOSIS — E66.01 MORBID OBESITY (HCC): ICD-10-CM

## 2022-05-13 DIAGNOSIS — R53.81 DEBILITY: ICD-10-CM

## 2022-05-13 DIAGNOSIS — G89.29 CHRONIC PAIN OF RIGHT KNEE: Primary | ICD-10-CM

## 2022-05-13 DIAGNOSIS — M25.561 CHRONIC PAIN OF RIGHT KNEE: Primary | ICD-10-CM

## 2022-05-13 PROCEDURE — 99283 EMERGENCY DEPT VISIT LOW MDM: CPT

## 2022-05-14 ENCOUNTER — APPOINTMENT (OUTPATIENT)
Dept: GENERAL RADIOLOGY | Age: 42
End: 2022-05-14
Attending: EMERGENCY MEDICINE
Payer: MEDICAID

## 2022-05-14 PROCEDURE — 73562 X-RAY EXAM OF KNEE 3: CPT

## 2022-05-14 NOTE — ED PROVIDER NOTES
EMERGENCY DEPARTMENT HISTORY AND PHYSICAL EXAM     ------------------------------------------------------------------------------------------------------  Please note that this dictation was completed with Bright[employer]md, the [employer] voice recognition software. Quite often unanticipated grammatical, syntax, homophones, and other interpretive errors are inadvertently transcribed by the computer software. Please disregard these errors. Please excuse any errors that have escaped final proofreading.  -----------------------------------------------------------------------------------------------------------------    Date: 5/13/2022  Patient Name: Rufino Harding    History of Presenting Illness     Chief Complaint   Patient presents with    Knee Pain       History Provided By: Patient    HPI: Rufino Harding is a 36 y.o. female, with significant pmhx of chronic debility arthritis, hypothyroidsim, who presents via EMS to the ED with c/o chronic debility with chronic right knee pain and subsequent fall today. Patient reports over the last 3 months she is barely been able to get off of her couch after she was deposited there [facility]. This that she suffered a fall in January with subsequent hospitalization and cervical fusion by Dr. Dario Abad. Notes that after being discharged from rehab she went home to live with her mother who cannot assist her in her activities of daily living. Notes that she uses pads to go to the bathroom instead of a commode or bathroom. Patient reports she left her house today for the first time in 3 months with her friend who assisted her from the couch to wheelchair and out to her car. Was able to get around [employer] and subsequently went to [facility] when she suffered a ground-level fall trying to get out of the car. Notes that her right leg buckled underneath her causing her to fall to the ground. Required multiple bystanders to help assist her back up.   Decided to come the emergency department for further evaluation of her ongoing right knee pain and instability. Patient further elaborates that she has \"been promised many things over the last 3 months\" including a ramp to her house for her wheelchair as well as in-home PT none of which she feels has been filled. Specifically notes that if she is discharged home she will go back to sitting on the couch perpetually. Reports having anterior pain along her kneecap that is shooting in nature up toward her right hip. Pt also specifically denies any recent fevers, chills, CP, SOB, nausea, vomiting, diarrhea, abd pain, changes in BM, urinary sxs, or headache. PCP: Carl Rodrigues MD    Social Hx: + tobacco, denies EtOH, denies recreational/ Illicit Drugs     There are no other complaints, changes, or physical findings at this time. Allergies   Allergen Reactions    Egg Diarrhea         Current Outpatient Medications   Medication Sig Dispense Refill    senna-docusate (PERICOLACE) 8.6-50 mg per tablet Take 1 Tablet by mouth daily. 30 Tablet 0       Past History     Past Medical History:  Past Medical History:   Diagnosis Date    Arthritis     Hypothyroidism        Past Surgical History:  Past Surgical History:   Procedure Laterality Date    HX ANKLE FRACTURE TX Left     ORIF       Family History:  History reviewed. No pertinent family history. Social History:  Social History     Tobacco Use    Smoking status: Current Every Day Smoker     Packs/day: 0.25     Years: 22.00     Pack years: 5.50    Smokeless tobacco: Never Used   Vaping Use    Vaping Use: Never used   Substance Use Topics    Alcohol use: Yes    Drug use: Not Currently     Types: Marijuana     Comment: last dose was 9 years ago       Allergies: Allergies   Allergen Reactions    Egg Diarrhea         Review of Systems   Review of Systems   Constitutional: Negative. Negative for fever. Eyes: Negative. Respiratory: Negative.   Negative for shortness of breath. Cardiovascular: Negative for chest pain. Gastrointestinal: Negative for abdominal pain, nausea and vomiting. Endocrine: Negative. Genitourinary: Negative. Negative for difficulty urinating, dysuria and hematuria. Musculoskeletal: Positive for arthralgias (chronic knee pain). Skin: Negative. Neurological: Negative. Psychiatric/Behavioral: Negative for suicidal ideas. All other systems reviewed and are negative. Physical Exam   Physical Exam  Vitals and nursing note reviewed. Constitutional:       General: She is not in acute distress. Appearance: She is well-developed. She is obese. She is not diaphoretic. HENT:      Head: Normocephalic and atraumatic. Nose: Nose normal.   Eyes:      General: No scleral icterus. Conjunctiva/sclera: Conjunctivae normal.   Neck:      Trachea: No tracheal deviation. Cardiovascular:      Rate and Rhythm: Normal rate and regular rhythm. Heart sounds: Normal heart sounds. No murmur heard. No friction rub. Pulmonary:      Effort: Pulmonary effort is normal. No respiratory distress. Breath sounds: Normal breath sounds. No stridor. No wheezing or rales. Abdominal:      General: Bowel sounds are normal. There is no distension. Palpations: Abdomen is soft. Tenderness: There is no abdominal tenderness. There is no rebound. Musculoskeletal:         General: No tenderness. Normal range of motion. Cervical back: Normal range of motion. Skin:     General: Skin is warm and dry. Findings: No rash. Neurological:      Mental Status: She is alert and oriented to person, place, and time. Cranial Nerves: No cranial nerve deficit. Psychiatric:         Speech: Speech normal.         Behavior: Behavior normal.         Thought Content:  Thought content normal.         Judgment: Judgment normal.           Diagnostic Study Results     Labs -   No results found for this or any previous visit (from the past 12 hour(s)). Radiologic Studies -   XR KNEE RT 3 V   Final Result   No acute abnormality. CT Results  (Last 48 hours)    None        CXR Results  (Last 48 hours)    None            Medical Decision Making   I am the first provider for this patient. I reviewed the vital signs, available nursing notes, past medical history, past surgical history, family history and social history. Vital Signs-Reviewed the patient's vital signs. Patient Vitals for the past 12 hrs:   Pulse Resp BP SpO2   05/13/22 2350 96 18 (!) 103/91 97 %       Pulse Oximetry Analysis - 97% on RA Normal    Records Reviewed/Interpretted: Nursing Notes from triage and Old Medical Records, multiple ER visits for chronic knee pain and ground-level fall    Provider Notes (Medical Decision Making):     DDX:  Fall, fracture, contusion, generalizability, morbid obesity    Plan:  X-ray, offered analgesic but declines. Notes that Tylenol will \"make her sleep for many hours    Impression:  Acute on chronic knee pain, generalized debility    ED Course:   Initial assessment performed. The patients presenting problems have been discussed, and they are in agreement with the care plan formulated and outlined with them. I have encouraged them to ask questions as they arise throughout their visit. I reviewed our electronic medical record system for any past medical records that were available that may contribute to the patients current condition, the nursing notes and and vital signs from today's visit  Nursing notes will be reviewed as they become available in realtime while the pt has been in the ED. Ling Saini MD    I personally reviewed/interpreted pt's imaging. Agree with official read by radiology as noted above. Ling Saini MD        Progress note:  Pt noted to be feeling better, ready for discharge. Discussed imaging findings with pt, specifically noting no evidence of fracture.   Discussed with patient options for observation with PT and case management intervention later today or have her be discharged home and place case management consult for them to contact her at their earliest convenience. Patient request discharge home at this time. Pt will follow up with case management, primary care as instructed. All questions have been answered, pt voiced understanding and agreement with plan. Specific return precautions provided in addition to instructions for pt to return to the ED immediately should sx worsen at any time. David Gonzalez MD             Critical Care Time:     none      Diagnosis     Clinical Impression:   1. Chronic pain of right knee    2. Debility    3. Tobacco dependence    4. Morbid obesity ([address]        PLAN:  1. Discharge Medication List as of 5/14/2022  2:03 AM        2. Follow-up Information     Follow up With Specialties Details Why Contact Info    Joshua Rojas MD Family Medicine Schedule an appointment as soon as possible for a visit in 2 days  [address] [address]  [phone]      [facility]  Schedule an appointment as soon as possible for a visit in 2 days  [address]  [address] [facility],[address]  [address] [facility]  [phone]    \[facility] [place] [facility]\"" EMERGENCY DEPT Emergency Medicine  As needed [address]  [address] [facility]  [phone]        Return to ED if worse     Disposition:   The patient's results have been reviewed with family and/or caregiver. They verbally convey their understanding and agreement of the patient's signs, symptoms, diagnosis, treatment and prognosis and additionally agree to follow up as recommended in the discharge instructions or to return to the Emergency Room should the patient's condition change prior to their follow-up appointment. The family and/or caregiver verbally agrees with the care-plan and all of their questions have been answered.  The discharge instructions have also been provided to the them with educational information regarding the patient's diagnosis as well a list of reasons why the patient would want to return to the ER prior to their follow-up appointment should their condition change.   Miguelito Tong MD

## 2022-05-14 NOTE — ED TRIAGE NOTES
R knee pain [id number]. States she fell while trying to get into her wheelchair. States she has been \"incapacitated\" and is unable to stand or walk without knee buckling. Pt states she lives with her mom who can't \"pick her up from the ground\". States \"I don't know what's wrong with my knee\" and was promised PT, but \"no one has done squat\".

## 2022-05-14 NOTE — DISCHARGE INSTRUCTIONS
It was a pleasure taking care of you in our Emergency Department today. We know that when you come to [facility], you are entrusting us with your health, comfort, and safety. Our physicians and nurses honor that trust, and truly appreciate the opportunity to care for you and your loved ones. We also value your feedback. If you receive a survey about your Emergency Department experience today, please fill it out. We care about our patients' feedback, and we listen to what you have to say. Thank you!       Dr. Echo Fry MD.

## 2023-08-17 ENCOUNTER — APPOINTMENT (OUTPATIENT)
Facility: HOSPITAL | Age: 43
DRG: 194 | End: 2023-08-17
Payer: MEDICAID

## 2023-08-17 ENCOUNTER — HOSPITAL ENCOUNTER (INPATIENT)
Facility: HOSPITAL | Age: 43
LOS: 3 days | Discharge: HOME | DRG: 194 | End: 2023-08-21
Attending: STUDENT IN AN ORGANIZED HEALTH CARE EDUCATION/TRAINING PROGRAM | Admitting: STUDENT IN AN ORGANIZED HEALTH CARE EDUCATION/TRAINING PROGRAM
Payer: MEDICAID

## 2023-08-17 DIAGNOSIS — R60.9 PERIPHERAL EDEMA: ICD-10-CM

## 2023-08-17 DIAGNOSIS — I50.9 NEW ONSET OF CONGESTIVE HEART FAILURE (HCC): Primary | ICD-10-CM

## 2023-08-17 DIAGNOSIS — N17.9 AKI (ACUTE KIDNEY INJURY) (HCC): ICD-10-CM

## 2023-08-17 DIAGNOSIS — R09.02 HYPOXIA: ICD-10-CM

## 2023-08-17 DIAGNOSIS — F17.200 SMOKER: ICD-10-CM

## 2023-08-17 LAB
ALBUMIN SERPL-MCNC: 3 G/DL (ref 3.5–5)
ALBUMIN/GLOB SERPL: 0.7 (ref 1.1–2.2)
ALP SERPL-CCNC: 88 U/L (ref 45–117)
ALT SERPL-CCNC: 12 U/L (ref 12–78)
ANION GAP SERPL CALC-SCNC: 5 MMOL/L (ref 5–15)
AST SERPL-CCNC: 12 U/L (ref 15–37)
BASOPHILS # BLD: 0 K/UL (ref 0–0.1)
BASOPHILS NFR BLD: 0 % (ref 0–1)
BILIRUB SERPL-MCNC: 0.3 MG/DL (ref 0.2–1)
BUN SERPL-MCNC: 11 MG/DL (ref 6–20)
BUN/CREAT SERPL: 9 (ref 12–20)
CALCIUM SERPL-MCNC: 8.5 MG/DL (ref 8.5–10.1)
CHLORIDE SERPL-SCNC: 105 MMOL/L (ref 97–108)
CO2 SERPL-SCNC: 28 MMOL/L (ref 21–32)
COMMENT:: NORMAL
CREAT SERPL-MCNC: 1.27 MG/DL (ref 0.55–1.02)
DIFFERENTIAL METHOD BLD: ABNORMAL
EOSINOPHIL # BLD: 0.2 K/UL (ref 0–0.4)
EOSINOPHIL NFR BLD: 2 % (ref 0–7)
ERYTHROCYTE [DISTWIDTH] IN BLOOD BY AUTOMATED COUNT: 16.8 % (ref 11.5–14.5)
GLOBULIN SER CALC-MCNC: 4.5 G/DL (ref 2–4)
GLUCOSE SERPL-MCNC: 124 MG/DL (ref 65–100)
HCT VFR BLD AUTO: 49.3 % (ref 35–47)
HGB BLD-MCNC: 15.2 G/DL (ref 11.5–16)
IMM GRANULOCYTES # BLD AUTO: 0.1 K/UL (ref 0–0.04)
IMM GRANULOCYTES NFR BLD AUTO: 1 % (ref 0–0.5)
LYMPHOCYTES # BLD: 2.1 K/UL (ref 0.8–3.5)
LYMPHOCYTES NFR BLD: 19 % (ref 12–49)
MAGNESIUM SERPL-MCNC: 2.2 MG/DL (ref 1.6–2.4)
MCH RBC QN AUTO: 29.7 PG (ref 26–34)
MCHC RBC AUTO-ENTMCNC: 30.8 G/DL (ref 30–36.5)
MCV RBC AUTO: 96.5 FL (ref 80–99)
MONOCYTES # BLD: 0.5 K/UL (ref 0–1)
MONOCYTES NFR BLD: 4 % (ref 5–13)
NEUTS SEG # BLD: 8.2 K/UL (ref 1.8–8)
NEUTS SEG NFR BLD: 74 % (ref 32–75)
NRBC # BLD: 0 K/UL (ref 0–0.01)
NRBC BLD-RTO: 0 PER 100 WBC
NT PRO BNP: 1607 PG/ML
PLATELET # BLD AUTO: 240 K/UL (ref 150–400)
PMV BLD AUTO: 10.6 FL (ref 8.9–12.9)
POTASSIUM SERPL-SCNC: 3.8 MMOL/L (ref 3.5–5.1)
PROT SERPL-MCNC: 7.5 G/DL (ref 6.4–8.2)
RBC # BLD AUTO: 5.11 M/UL (ref 3.8–5.2)
SODIUM SERPL-SCNC: 138 MMOL/L (ref 136–145)
SPECIMEN HOLD: NORMAL
TROPONIN I SERPL HS-MCNC: 7 NG/L (ref 0–51)
WBC # BLD AUTO: 11 K/UL (ref 3.6–11)

## 2023-08-17 PROCEDURE — 99285 EMERGENCY DEPT VISIT HI MDM: CPT

## 2023-08-17 PROCEDURE — 93005 ELECTROCARDIOGRAM TRACING: CPT | Performed by: STUDENT IN AN ORGANIZED HEALTH CARE EDUCATION/TRAINING PROGRAM

## 2023-08-17 PROCEDURE — 85025 COMPLETE CBC W/AUTO DIFF WBC: CPT

## 2023-08-17 PROCEDURE — 83735 ASSAY OF MAGNESIUM: CPT

## 2023-08-17 PROCEDURE — 36415 COLL VENOUS BLD VENIPUNCTURE: CPT

## 2023-08-17 PROCEDURE — 71045 X-RAY EXAM CHEST 1 VIEW: CPT

## 2023-08-17 PROCEDURE — 84484 ASSAY OF TROPONIN QUANT: CPT

## 2023-08-17 PROCEDURE — 80053 COMPREHEN METABOLIC PANEL: CPT

## 2023-08-17 PROCEDURE — 83880 ASSAY OF NATRIURETIC PEPTIDE: CPT

## 2023-08-17 ASSESSMENT — LIFESTYLE VARIABLES
HOW MANY STANDARD DRINKS CONTAINING ALCOHOL DO YOU HAVE ON A TYPICAL DAY: 1 OR 2
HOW OFTEN DO YOU HAVE A DRINK CONTAINING ALCOHOL: 2-4 TIMES A MONTH

## 2023-08-18 ENCOUNTER — APPOINTMENT (OUTPATIENT)
Facility: HOSPITAL | Age: 43
DRG: 194 | End: 2023-08-18
Attending: STUDENT IN AN ORGANIZED HEALTH CARE EDUCATION/TRAINING PROGRAM
Payer: MEDICAID

## 2023-08-18 PROBLEM — J96.01 ACUTE HYPOXEMIC RESPIRATORY FAILURE (HCC): Status: ACTIVE | Noted: 2023-08-18

## 2023-08-18 LAB
ANION GAP SERPL CALC-SCNC: 2 MMOL/L (ref 5–15)
BASOPHILS # BLD: 0 K/UL (ref 0–0.1)
BASOPHILS NFR BLD: 0 % (ref 0–1)
BUN SERPL-MCNC: 11 MG/DL (ref 6–20)
BUN/CREAT SERPL: 10 (ref 12–20)
CALCIUM SERPL-MCNC: 8.1 MG/DL (ref 8.5–10.1)
CHLORIDE SERPL-SCNC: 107 MMOL/L (ref 97–108)
CHOLEST SERPL-MCNC: 99 MG/DL
CO2 SERPL-SCNC: 30 MMOL/L (ref 21–32)
CREAT SERPL-MCNC: 1.12 MG/DL (ref 0.55–1.02)
DIFFERENTIAL METHOD BLD: ABNORMAL
ECHO AO ROOT DIAM: 2.9 CM
ECHO AO ROOT INDEX: 1.22 CM/M2
ECHO BSA: 2.56 M2
ECHO LA DIAMETER INDEX: 1.69 CM/M2
ECHO LA DIAMETER: 4 CM
ECHO LA TO AORTIC ROOT RATIO: 1.38
ECHO LV FRACTIONAL SHORTENING: 25 % (ref 28–44)
ECHO LV INTERNAL DIMENSION DIASTOLE INDEX: 2.19 CM/M2
ECHO LV INTERNAL DIMENSION DIASTOLIC: 5.2 CM (ref 3.9–5.3)
ECHO LV INTERNAL DIMENSION SYSTOLIC INDEX: 1.65 CM/M2
ECHO LV INTERNAL DIMENSION SYSTOLIC: 3.9 CM
ECHO LV IVSD: 1.2 CM (ref 0.6–0.9)
ECHO LV MASS 2D: 248.8 G (ref 67–162)
ECHO LV MASS INDEX 2D: 105 G/M2 (ref 43–95)
ECHO LV POSTERIOR WALL DIASTOLIC: 1.2 CM (ref 0.6–0.9)
ECHO LV RELATIVE WALL THICKNESS RATIO: 0.46
ECHO LVOT AREA: 4.2 CM2
ECHO LVOT DIAM: 2.3 CM
EKG ATRIAL RATE: 84 BPM
EKG DIAGNOSIS: NORMAL
EKG P AXIS: 63 DEGREES
EKG P-R INTERVAL: 132 MS
EKG Q-T INTERVAL: 380 MS
EKG QRS DURATION: 82 MS
EKG QTC CALCULATION (BAZETT): 449 MS
EKG R AXIS: 81 DEGREES
EKG T AXIS: 41 DEGREES
EKG VENTRICULAR RATE: 84 BPM
EOSINOPHIL # BLD: 0.2 K/UL (ref 0–0.4)
EOSINOPHIL NFR BLD: 2 % (ref 0–7)
ERYTHROCYTE [DISTWIDTH] IN BLOOD BY AUTOMATED COUNT: 16.6 % (ref 11.5–14.5)
EST. AVERAGE GLUCOSE BLD GHB EST-MCNC: 117 MG/DL
GLUCOSE SERPL-MCNC: 123 MG/DL (ref 65–100)
HBA1C MFR BLD: 5.7 % (ref 4–5.6)
HCT VFR BLD AUTO: 44 % (ref 35–47)
HDLC SERPL-MCNC: 27 MG/DL
HDLC SERPL: 3.7 (ref 0–5)
HGB BLD-MCNC: 13.5 G/DL (ref 11.5–16)
IMM GRANULOCYTES # BLD AUTO: 0 K/UL (ref 0–0.04)
IMM GRANULOCYTES NFR BLD AUTO: 0 % (ref 0–0.5)
LDLC SERPL CALC-MCNC: 59.6 MG/DL (ref 0–100)
LYMPHOCYTES # BLD: 1.9 K/UL (ref 0.8–3.5)
LYMPHOCYTES NFR BLD: 19 % (ref 12–49)
MCH RBC QN AUTO: 29.7 PG (ref 26–34)
MCHC RBC AUTO-ENTMCNC: 30.7 G/DL (ref 30–36.5)
MCV RBC AUTO: 96.7 FL (ref 80–99)
MONOCYTES # BLD: 0.5 K/UL (ref 0–1)
MONOCYTES NFR BLD: 5 % (ref 5–13)
NEUTS SEG # BLD: 7.4 K/UL (ref 1.8–8)
NEUTS SEG NFR BLD: 74 % (ref 32–75)
NRBC # BLD: 0 K/UL (ref 0–0.01)
NRBC BLD-RTO: 0 PER 100 WBC
PLATELET # BLD AUTO: 212 K/UL (ref 150–400)
PMV BLD AUTO: 10.2 FL (ref 8.9–12.9)
POTASSIUM SERPL-SCNC: 3.8 MMOL/L (ref 3.5–5.1)
RBC # BLD AUTO: 4.55 M/UL (ref 3.8–5.2)
SODIUM SERPL-SCNC: 139 MMOL/L (ref 136–145)
TRIGL SERPL-MCNC: 62 MG/DL
TSH SERPL DL<=0.05 MIU/L-ACNC: 5.86 UIU/ML (ref 0.36–3.74)
VLDLC SERPL CALC-MCNC: 12.4 MG/DL
WBC # BLD AUTO: 10 K/UL (ref 3.6–11)

## 2023-08-18 PROCEDURE — 96374 THER/PROPH/DIAG INJ IV PUSH: CPT

## 2023-08-18 PROCEDURE — 83036 HEMOGLOBIN GLYCOSYLATED A1C: CPT

## 2023-08-18 PROCEDURE — C8924 2D TTE W OR W/O FOL W/CON,FU: HCPCS

## 2023-08-18 PROCEDURE — 6360000002 HC RX W HCPCS: Performed by: STUDENT IN AN ORGANIZED HEALTH CARE EDUCATION/TRAINING PROGRAM

## 2023-08-18 PROCEDURE — 85025 COMPLETE CBC W/AUTO DIFF WBC: CPT

## 2023-08-18 PROCEDURE — 2700000000 HC OXYGEN THERAPY PER DAY

## 2023-08-18 PROCEDURE — 80061 LIPID PANEL: CPT

## 2023-08-18 PROCEDURE — 2500000003 HC RX 250 WO HCPCS: Performed by: STUDENT IN AN ORGANIZED HEALTH CARE EDUCATION/TRAINING PROGRAM

## 2023-08-18 PROCEDURE — 94760 N-INVAS EAR/PLS OXIMETRY 1: CPT

## 2023-08-18 PROCEDURE — 2580000003 HC RX 258: Performed by: STUDENT IN AN ORGANIZED HEALTH CARE EDUCATION/TRAINING PROGRAM

## 2023-08-18 PROCEDURE — 84443 ASSAY THYROID STIM HORMONE: CPT

## 2023-08-18 PROCEDURE — 80048 BASIC METABOLIC PNL TOTAL CA: CPT

## 2023-08-18 PROCEDURE — 6360000004 HC RX CONTRAST MEDICATION: Performed by: STUDENT IN AN ORGANIZED HEALTH CARE EDUCATION/TRAINING PROGRAM

## 2023-08-18 PROCEDURE — 36415 COLL VENOUS BLD VENIPUNCTURE: CPT

## 2023-08-18 PROCEDURE — 6370000000 HC RX 637 (ALT 250 FOR IP): Performed by: STUDENT IN AN ORGANIZED HEALTH CARE EDUCATION/TRAINING PROGRAM

## 2023-08-18 PROCEDURE — 2500000003 HC RX 250 WO HCPCS: Performed by: PHYSICIAN ASSISTANT

## 2023-08-18 PROCEDURE — 1100000003 HC PRIVATE W/ TELEMETRY

## 2023-08-18 RX ORDER — PANTOPRAZOLE SODIUM 40 MG/1
40 TABLET, DELAYED RELEASE ORAL DAILY
COMMUNITY

## 2023-08-18 RX ORDER — GABAPENTIN 300 MG/1
300 CAPSULE ORAL 3 TIMES DAILY
Status: DISCONTINUED | OUTPATIENT
Start: 2023-08-18 | End: 2023-08-21 | Stop reason: HOSPADM

## 2023-08-18 RX ORDER — IPRATROPIUM BROMIDE AND ALBUTEROL SULFATE 2.5; .5 MG/3ML; MG/3ML
1 SOLUTION RESPIRATORY (INHALATION) EVERY 4 HOURS PRN
Status: DISCONTINUED | OUTPATIENT
Start: 2023-08-18 | End: 2023-08-21 | Stop reason: HOSPADM

## 2023-08-18 RX ORDER — BUMETANIDE 0.25 MG/ML
0.5 INJECTION INTRAMUSCULAR; INTRAVENOUS ONCE
Status: DISCONTINUED | OUTPATIENT
Start: 2023-08-18 | End: 2023-08-18

## 2023-08-18 RX ORDER — BUMETANIDE 0.25 MG/ML
1 INJECTION INTRAMUSCULAR; INTRAVENOUS
Status: DISCONTINUED | OUTPATIENT
Start: 2023-08-18 | End: 2023-08-21 | Stop reason: HOSPADM

## 2023-08-18 RX ORDER — POLYETHYLENE GLYCOL 3350 17 G/17G
17 POWDER, FOR SOLUTION ORAL DAILY PRN
Status: DISCONTINUED | OUTPATIENT
Start: 2023-08-18 | End: 2023-08-21 | Stop reason: HOSPADM

## 2023-08-18 RX ORDER — ACETAMINOPHEN 325 MG/1
650 TABLET ORAL EVERY 6 HOURS PRN
Status: DISCONTINUED | OUTPATIENT
Start: 2023-08-18 | End: 2023-08-21 | Stop reason: HOSPADM

## 2023-08-18 RX ORDER — GABAPENTIN 300 MG/1
300 CAPSULE ORAL 3 TIMES DAILY
COMMUNITY

## 2023-08-18 RX ORDER — SODIUM CHLORIDE 9 MG/ML
INJECTION, SOLUTION INTRAVENOUS PRN
Status: DISCONTINUED | OUTPATIENT
Start: 2023-08-18 | End: 2023-08-21 | Stop reason: HOSPADM

## 2023-08-18 RX ORDER — POLYETHYLENE GLYCOL 3350 17 G
2 POWDER IN PACKET (EA) ORAL
Status: DISCONTINUED | OUTPATIENT
Start: 2023-08-18 | End: 2023-08-21 | Stop reason: HOSPADM

## 2023-08-18 RX ORDER — BUMETANIDE 0.25 MG/ML
1 INJECTION INTRAMUSCULAR; INTRAVENOUS ONCE
Status: COMPLETED | OUTPATIENT
Start: 2023-08-18 | End: 2023-08-18

## 2023-08-18 RX ORDER — LEVOTHYROXINE SODIUM 0.07 MG/1
75 TABLET ORAL DAILY
Status: DISCONTINUED | OUTPATIENT
Start: 2023-08-18 | End: 2023-08-21 | Stop reason: HOSPADM

## 2023-08-18 RX ORDER — SODIUM CHLORIDE 0.9 % (FLUSH) 0.9 %
5-40 SYRINGE (ML) INJECTION PRN
Status: DISCONTINUED | OUTPATIENT
Start: 2023-08-18 | End: 2023-08-21 | Stop reason: HOSPADM

## 2023-08-18 RX ORDER — LEVOTHYROXINE SODIUM 0.07 MG/1
75 TABLET ORAL DAILY
COMMUNITY

## 2023-08-18 RX ORDER — ENOXAPARIN SODIUM 100 MG/ML
40 INJECTION SUBCUTANEOUS 2 TIMES DAILY
Status: DISCONTINUED | OUTPATIENT
Start: 2023-08-18 | End: 2023-08-21

## 2023-08-18 RX ORDER — PANTOPRAZOLE SODIUM 40 MG/1
40 TABLET, DELAYED RELEASE ORAL DAILY
Status: DISCONTINUED | OUTPATIENT
Start: 2023-08-18 | End: 2023-08-21 | Stop reason: HOSPADM

## 2023-08-18 RX ORDER — METOPROLOL SUCCINATE 25 MG/1
25 TABLET, EXTENDED RELEASE ORAL DAILY
Status: DISCONTINUED | OUTPATIENT
Start: 2023-08-18 | End: 2023-08-21 | Stop reason: HOSPADM

## 2023-08-18 RX ORDER — ONDANSETRON 4 MG/1
4 TABLET, ORALLY DISINTEGRATING ORAL EVERY 8 HOURS PRN
Status: DISCONTINUED | OUTPATIENT
Start: 2023-08-18 | End: 2023-08-21 | Stop reason: HOSPADM

## 2023-08-18 RX ORDER — SODIUM CHLORIDE 0.9 % (FLUSH) 0.9 %
5-40 SYRINGE (ML) INJECTION EVERY 12 HOURS SCHEDULED
Status: DISCONTINUED | OUTPATIENT
Start: 2023-08-18 | End: 2023-08-21 | Stop reason: HOSPADM

## 2023-08-18 RX ORDER — METOPROLOL SUCCINATE 25 MG/1
25 TABLET, EXTENDED RELEASE ORAL DAILY
COMMUNITY

## 2023-08-18 RX ORDER — ACETAMINOPHEN 650 MG/1
650 SUPPOSITORY RECTAL EVERY 6 HOURS PRN
Status: DISCONTINUED | OUTPATIENT
Start: 2023-08-18 | End: 2023-08-21 | Stop reason: HOSPADM

## 2023-08-18 RX ORDER — ONDANSETRON 2 MG/ML
4 INJECTION INTRAMUSCULAR; INTRAVENOUS EVERY 6 HOURS PRN
Status: DISCONTINUED | OUTPATIENT
Start: 2023-08-18 | End: 2023-08-21 | Stop reason: HOSPADM

## 2023-08-18 RX ADMIN — GABAPENTIN 300 MG: 300 CAPSULE ORAL at 21:52

## 2023-08-18 RX ADMIN — SODIUM CHLORIDE, PRESERVATIVE FREE 10 ML: 5 INJECTION INTRAVENOUS at 10:51

## 2023-08-18 RX ADMIN — GABAPENTIN 300 MG: 300 CAPSULE ORAL at 15:25

## 2023-08-18 RX ADMIN — MICONAZOLE NITRATE: 20 CREAM TOPICAL at 15:25

## 2023-08-18 RX ADMIN — SODIUM CHLORIDE, PRESERVATIVE FREE 10 ML: 5 INJECTION INTRAVENOUS at 21:52

## 2023-08-18 RX ADMIN — ENOXAPARIN SODIUM 40 MG: 100 INJECTION SUBCUTANEOUS at 10:50

## 2023-08-18 RX ADMIN — LEVOTHYROXINE SODIUM 75 MCG: 0.07 TABLET ORAL at 07:17

## 2023-08-18 RX ADMIN — ENOXAPARIN SODIUM 40 MG: 100 INJECTION SUBCUTANEOUS at 21:52

## 2023-08-18 RX ADMIN — METOPROLOL SUCCINATE 25 MG: 25 TABLET, FILM COATED, EXTENDED RELEASE ORAL at 10:51

## 2023-08-18 RX ADMIN — ENOXAPARIN SODIUM 40 MG: 100 INJECTION SUBCUTANEOUS at 02:52

## 2023-08-18 RX ADMIN — BUMETANIDE 1 MG: 0.25 INJECTION INTRAMUSCULAR; INTRAVENOUS at 00:59

## 2023-08-18 RX ADMIN — BUMETANIDE 1 MG: 0.25 INJECTION INTRAMUSCULAR; INTRAVENOUS at 17:01

## 2023-08-18 RX ADMIN — PANTOPRAZOLE SODIUM 40 MG: 40 TABLET, DELAYED RELEASE ORAL at 10:51

## 2023-08-18 RX ADMIN — MICONAZOLE NITRATE: 20 CREAM TOPICAL at 21:55

## 2023-08-18 RX ADMIN — GABAPENTIN 300 MG: 300 CAPSULE ORAL at 10:51

## 2023-08-18 RX ADMIN — BUMETANIDE 1 MG: 0.25 INJECTION INTRAMUSCULAR; INTRAVENOUS at 07:17

## 2023-08-18 RX ADMIN — PERFLUTREN 1.5 ML: 6.52 INJECTION, SUSPENSION INTRAVENOUS at 10:16

## 2023-08-18 NOTE — WOUND CARE
Wound care consult for the \"open areas & seemingly yeast in all skin folds\". Chart reviewed. Pt. Is 39years old, severely obese and she does not wear a bra. She stated that she battles the fungal infection all the time with just \"airing the skin out and applying zinc oxide as needed at home. Pt. Needs an antifungal cream.   Pt. Has new onset heart failure and HTN and she smokes, has peripheral neuropathy, GERD and MICHELET. Assessemnt: the skin under the both breasts is wet and malodorous with satellite lesions noted all over the skin folds there and to a milder degree in the abdominal folds. Pt. Has a small abscess on the abdomen. She would not let me do anything with it today but this needs to be treated and the skin cleansed daily. Recommended treatment:  Secura Antifungal cream (Miconazole) to clear up the yeast infected skin along with a good cleansing of the skin daily. I suggested to the patient that she wear a bra or at least keep the skin dry with a cloth once the infection clears up. Topical antibiotics may benefit the patient with regard to the small skin abscess. Wound care order written for her today.    Мария Ortega, RN, BSN, [employer]

## 2023-08-18 NOTE — CONSULTS
[facility]  [address] [place] [address], [address] [place] [address]  [phone]  WWW. [employer]       CARDIOLOGY CONSULTATION       Date of  Admission: 8/17/2023 10:25 PM     Admission type:Emergency   Primary Care [address]     Attending Provider: Fara Meng DO  Cardiology Provider:   CC/REASON FOR CONSULT: Heart failure     Subjective:     Ondina Giraldo is a 39 y.o. female admitted for Peripheral edema [R60.9]  Hypoxia [R09.02]  Smoker [F17.200]  BRENNON (acute kidney injury) ([address]) [N17.9]  Acute hypoxemic respiratory failure ([address]) [J96.01]  New onset of congestive heart failure ([address]) [I50.9]. The patient was seen and examined at the bedside. She reports that the main reason for presentation is worsening swelling in her feet for the last 1 month. Breathing seems to be okay for the most part. She has had a small/mild cough with sputum off and on. No chest pain no passing out episodes. Denies any heart problems. Her mother and sister suffered from different heart surgeries when they were young. The patient denies any fevers, chills, cold she does not use oxygen at home. She cannot walk well on her own, can walk only less than 5-10 steps. Patient has not had a cardiologist in the past.  Unable to tell me if she has bleeding in the stool. Or black stools. In the hospital, vital signs stable, creatinine level 1.12 mg/dL, NT proBNP 1607 pg/mL, troponin 7 ng/L.     Patient Active Problem List    Diagnosis Date Noted    Acute hypoxemic respiratory failure ([address]) 08/18/2023    Cervical radiculopathy 01/31/2022    Right knee pain 01/31/2022    Inability to ambulate due to multiple joints 01/31/2022    Falls 01/29/2022      Valentina Weldon  Past Medical History:   Diagnosis Date    Arthritis     Hypothyroidism       Social History     Socioeconomic History    Marital status: Single     Spouse name: None    Number of children: None    Years of education: None    Highest education level: None   Tobacco Use    Smoking status: Every Day     Packs/day: 0.50     Types: Cigarettes    Smokeless tobacco: Never   Substance and Sexual Activity    Alcohol use: Yes    Drug use: Not Currently     Types: Marijuana (Weed)     Allergies   Allergen Reactions    Egg Solids, Whole Diarrhea      History reviewed. No pertinent family history. Current Facility-Administered Medications   Medication Dose Route Frequency    gabapentin (NEURONTIN) capsule 300 mg  300 mg Oral TID    levothyroxine (SYNTHROID) tablet 75 mcg  75 mcg Oral Daily    metoprolol succinate (TOPROL XL) extended release tablet 25 mg  25 mg Oral Daily    pantoprazole (PROTONIX) tablet 40 mg  40 mg Oral Daily    ipratropium 0.5 mg-albuterol 2.5 mg (DUONEB) nebulizer solution 1 Dose  1 Dose Inhalation Q4H PRN    sodium chloride flush 0.9 % injection 5-40 mL  5-40 mL IntraVENous 2 times per day    sodium chloride flush 0.9 % injection 5-40 mL  5-40 mL IntraVENous PRN    0.9 % sodium chloride infusion   IntraVENous PRN    enoxaparin (LOVENOX) injection 40 mg  40 mg SubCUTAneous BID    ondansetron (ZOFRAN-ODT) disintegrating tablet 4 mg  4 mg Oral Q8H PRN    Or    ondansetron (ZOFRAN) injection 4 mg  4 mg IntraVENous Q6H PRN    polyethylene glycol (GLYCOLAX) packet 17 g  17 g Oral Daily PRN    acetaminophen (TYLENOL) tablet 650 mg  650 mg Oral Q6H PRN    Or    acetaminophen (TYLENOL) suppository 650 mg  650 mg Rectal Q6H PRN    nicotine polacrilex (COMMIT) lozenge 2 mg  2 mg Oral Q1H PRN    bumetanide (BUMEX) injection 1 mg  1 mg IntraVENous BID AC    miconazole (MICOTIN) 2 % cream   Topical BID          REVIEW OF SYSTEMS  Review of Systems   All other systems reviewed and are negative. Objective:      Vitals:    08/18/23 1006   BP: 134/70   Pulse:    Resp:    Temp:    SpO2:        Physical Exam:     Physical Exam  Constitutional:       General: She is not in acute distress.   HENT:      Head: Normocephalic and

## 2023-08-18 NOTE — ED NOTES
Pts O2 sats kept dropping into the mid 80's while sleeping. PA left and no attending was assigned, so I asked the admitting hospitalist to bump her O2 to 4 l/min per Nasal Cannula from 2 l/min. MD gave verbal order.  Once patient's O2 was bumped up, her sats improved     Danny Bhatt RN  08/18/23 3362

## 2023-08-18 NOTE — CARE COORDINATION
Care Management Initial Assessment       RUR: 10%  Readmission? No  1st IM letter given? No  1st  letter given: No        08/18/23 1522   Service Assessment   Patient Orientation Alert and Oriented   Cognition Alert   History Provided By Patient   Primary [address]   Patient's Healthcare Decision Maker is: Legal Next of [address] [facility]   PCP Verified by CM Yes   Last Visit to PCP Within last year   Prior Functional Level Assistance with the following:;Bathing;Dressing; Toileting;Cooking;Housework; Shopping;Mobility   Current Functional Level Assistance with the following:;Bathing;Dressing; Toileting;Cooking;Housework; Shopping;Mobility   Can patient return to prior living arrangement Yes   Ability to make needs known: Good   Family able to assist with home care needs: Yes   Would you like for me to discuss the discharge plan with any other family members/significant others, and if so, who? Yes   Financial Resources Medicaid   Social/Functional History   Lives With Parent   Type of [address] [place] [address]  One level   Home Access Ramped entrance   [facility] Needs assistance   [facility] Needs assistance   Ambulation Assistance Needs assistance   Transfer Assistance Needs assistance   Active  No   Occupation Unemployed   Discharge Planning   Type of [address] Parent   Current Services Prior To Admission [address]   DME Ordered? No   Potential Assistance Purchasing Medications No   Type of Home Care Services PT;OT   Patient expects to be discharged to: House       Chart reviewed. Assessment completed at bedside. Pt will need transport assistance at d/c. She will return home with her mother who is her primary caregiver. Pt reports she sleeps on the sofa and is typically non-ambulatory at home. She does not have home O2.  Mother's phone number is

## 2023-08-18 NOTE — ED NOTES
Report has been called and morning labs were sent per request of [address], MARIELY Potts RN  08/18/23 9374

## 2023-08-18 NOTE — PLAN OF CARE
Problem: Discharge Planning  Goal: Discharge to home or other facility with appropriate resources  Outcome: Progressing  Flowsheets (Taken 8/18/2023 1690 by Karen Mccollum RN)  Discharge to home or other facility with appropriate resources:   Identify barriers to discharge with patient and caregiver   Identify discharge learning needs (meds, wound care, etc)   Arrange for needed discharge resources and transportation as appropriate   Refer to discharge planning if patient needs post-hospital services based on physician order or complex needs related to functional status, cognitive ability or social support system     Problem: Safety - Adult  Goal: Free from fall injury  Outcome: Progressing

## 2023-08-18 NOTE — ED TRIAGE NOTES
Pt brought in by EMS from home. Pt non-ambulatory with bilateral leg swelling. Pt says she has no history of CHF or any other cardiac hx. Pt says she doesn't take a fluid pill. Pt denies chest pain and SOB.

## 2023-08-18 NOTE — H&P
Hospitalist Admission Note    NAME:  Kristina Tang   :  1980   MRN:  [id number]     Date/Time:  2023 2:47 AM    Patient PCP: Halie Found    ______________________________________________________________________  Given the patient's current clinical presentation, I have a high level of concern for decompensation if discharged from the emergency department. Complex decision making was performed, which includes reviewing the patient's available past medical records, laboratory results, and x-ray films. My assessment of this patient's clinical condition and my plan of care is as follows. Assessment / Plan: Active Problems:  Acute hypoxemic respiratory failure  New onset heart failure  Essential hypertension  Suspect underlying COPD  Tobacco abuse  Obesity class III by BMI 62.34  Suspect MICHELET/OHS  Peripheral neuropathy  GERD    Plan:  Acute hypoxemic respiratory failure  New onset heart failure  Essential hypertension  Admit to telemetry monitoring  Cardiology consulted, greatly appreciate their expertise  Obtain TTE  Labs: Lipids, hemoglobin A1c, TSH  Repeat Bumex at 06 100  Trend BMP with reflex magnesium-replace electrolytes as needed  Continue PTA metoprolol    Suspect underlying COPD  Tobacco abuse  5 minutes tobacco cessation counseling provided with emphasis on adverse cardiopulmonary effects of ongoing tobacco use. After discussion patient reports \"I do not like anyone to tell me to do and \"if I want to. \"  Declines nicotine replacement therapy    Obesity class III by BMI 62.34  Suspect MICHELET/OHS  Recommend medically assisted weight loss in outpatient setting  Recommend outpatient sleep study    Peripheral neuropathy  Continue PTA gabapentin    GERD  Continue PTA Protonix    Medical Decision Making:   I personally reviewed labs: Yes, as listed below  I personally reviewed imaging: CXR  Toxic drug monitoring: IV Bumex  Discussed case with: ED provider.  After discussion I am in without  intravenous contrast administration. Sagittal and coronal reformats were  generated. CT dose reduction was achieved through use of a standardized  protocol tailored for this examination and automatic exposure control for dose  modulation. FINDINGS:    The alignment is remarkable for a C-shaped curvature, the apex directed to the  right. . There is no fracture or compression deformity. The odontoid process is  intact. The craniocervical junction is within normal limits. The large  protrusions identified at C3-4, C4-5, and C5-6 do not have a significant osseous  component. The incidentally imaged soft tissues are within normal limits. C2-C3: There is no spinal canal or neural foraminal stenosis. C3-C4: Congenitally short pedicles. Central disc protrusion. Mild cord  flattening. Central canal is narrowed to 4.5 mm. Mild bilateral neural foraminal  stenosis secondary to uncovertebral joint hypertrophy (series 3, image 40). C4-C5: Congenitally short pedicles. Large central disc protrusion resulting in  cord flattening with an anterior to posterior diameter of about 4.2 mm. Bilateral uncovertebral joint hypertrophy results in mild bilateral osseous  neural foraminal stenosis (series 3, image 47), but there is moderate right and  severe left neural foraminal stenosis secondary to the associated soft tissue  component. C5-C6: Congenitally short pedicles. Large central protrusion, narrowing the  central canal to about 3 mm. Uncovertebral joint hypertrophy is asymmetric to  the left. There is also asymmetric left-sided facet arthropathy. This results in  effective mild right and moderate severe left neural foraminal stenosis. (Series  3, image 54). C6-C7: There is no obvious neural foraminal stenosis.  The congenitally short  pedicles, in combination with the central protrusion, narrows the central canal  to an effective 8.2 mm.    C7-T1: There is no spinal canal or neural foraminal

## 2023-08-19 LAB
ANION GAP SERPL CALC-SCNC: 0 MMOL/L (ref 5–15)
BASOPHILS # BLD: 0 K/UL (ref 0–0.1)
BASOPHILS NFR BLD: 0 % (ref 0–1)
BUN SERPL-MCNC: 10 MG/DL (ref 6–20)
BUN/CREAT SERPL: 11 (ref 12–20)
CALCIUM SERPL-MCNC: 8.1 MG/DL (ref 8.5–10.1)
CHLORIDE SERPL-SCNC: 103 MMOL/L (ref 97–108)
CO2 SERPL-SCNC: 33 MMOL/L (ref 21–32)
CREAT SERPL-MCNC: 0.93 MG/DL (ref 0.55–1.02)
DIFFERENTIAL METHOD BLD: ABNORMAL
EOSINOPHIL # BLD: 0.1 K/UL (ref 0–0.4)
EOSINOPHIL NFR BLD: 1 % (ref 0–7)
ERYTHROCYTE [DISTWIDTH] IN BLOOD BY AUTOMATED COUNT: 16.5 % (ref 11.5–14.5)
GLUCOSE SERPL-MCNC: 99 MG/DL (ref 65–100)
HCT VFR BLD AUTO: 44.6 % (ref 35–47)
HGB BLD-MCNC: 13.8 G/DL (ref 11.5–16)
IMM GRANULOCYTES # BLD AUTO: 0.1 K/UL (ref 0–0.04)
IMM GRANULOCYTES NFR BLD AUTO: 1 % (ref 0–0.5)
LYMPHOCYTES # BLD: 1.8 K/UL (ref 0.8–3.5)
LYMPHOCYTES NFR BLD: 19 % (ref 12–49)
MCH RBC QN AUTO: 29.9 PG (ref 26–34)
MCHC RBC AUTO-ENTMCNC: 30.9 G/DL (ref 30–36.5)
MCV RBC AUTO: 96.7 FL (ref 80–99)
MONOCYTES # BLD: 0.4 K/UL (ref 0–1)
MONOCYTES NFR BLD: 4 % (ref 5–13)
NEUTS SEG # BLD: 7.1 K/UL (ref 1.8–8)
NEUTS SEG NFR BLD: 75 % (ref 32–75)
NRBC # BLD: 0 K/UL (ref 0–0.01)
NRBC BLD-RTO: 0 PER 100 WBC
PLATELET # BLD AUTO: 196 K/UL (ref 150–400)
PMV BLD AUTO: 9.9 FL (ref 8.9–12.9)
POTASSIUM SERPL-SCNC: 4.3 MMOL/L (ref 3.5–5.1)
RBC # BLD AUTO: 4.61 M/UL (ref 3.8–5.2)
RBC MORPH BLD: ABNORMAL
SODIUM SERPL-SCNC: 136 MMOL/L (ref 136–145)
WBC # BLD AUTO: 9.5 K/UL (ref 3.6–11)

## 2023-08-19 PROCEDURE — 85025 COMPLETE CBC W/AUTO DIFF WBC: CPT

## 2023-08-19 PROCEDURE — 36415 COLL VENOUS BLD VENIPUNCTURE: CPT

## 2023-08-19 PROCEDURE — 6370000000 HC RX 637 (ALT 250 FOR IP): Performed by: STUDENT IN AN ORGANIZED HEALTH CARE EDUCATION/TRAINING PROGRAM

## 2023-08-19 PROCEDURE — 1100000003 HC PRIVATE W/ TELEMETRY

## 2023-08-19 PROCEDURE — 2700000000 HC OXYGEN THERAPY PER DAY

## 2023-08-19 PROCEDURE — 6360000002 HC RX W HCPCS: Performed by: STUDENT IN AN ORGANIZED HEALTH CARE EDUCATION/TRAINING PROGRAM

## 2023-08-19 PROCEDURE — 80048 BASIC METABOLIC PNL TOTAL CA: CPT

## 2023-08-19 PROCEDURE — 2500000003 HC RX 250 WO HCPCS: Performed by: STUDENT IN AN ORGANIZED HEALTH CARE EDUCATION/TRAINING PROGRAM

## 2023-08-19 PROCEDURE — 2580000003 HC RX 258: Performed by: STUDENT IN AN ORGANIZED HEALTH CARE EDUCATION/TRAINING PROGRAM

## 2023-08-19 RX ADMIN — BUMETANIDE 1 MG: 0.25 INJECTION INTRAMUSCULAR; INTRAVENOUS at 06:07

## 2023-08-19 RX ADMIN — ENOXAPARIN SODIUM 40 MG: 100 INJECTION SUBCUTANEOUS at 10:00

## 2023-08-19 RX ADMIN — SODIUM CHLORIDE, PRESERVATIVE FREE 10 ML: 5 INJECTION INTRAVENOUS at 10:00

## 2023-08-19 RX ADMIN — MICONAZOLE NITRATE: 20 CREAM TOPICAL at 10:00

## 2023-08-19 RX ADMIN — BUMETANIDE 1 MG: 0.25 INJECTION INTRAMUSCULAR; INTRAVENOUS at 17:02

## 2023-08-19 RX ADMIN — PANTOPRAZOLE SODIUM 40 MG: 40 TABLET, DELAYED RELEASE ORAL at 10:00

## 2023-08-19 RX ADMIN — ENOXAPARIN SODIUM 40 MG: 100 INJECTION SUBCUTANEOUS at 20:44

## 2023-08-19 RX ADMIN — LEVOTHYROXINE SODIUM 75 MCG: 0.07 TABLET ORAL at 06:00

## 2023-08-19 RX ADMIN — GABAPENTIN 300 MG: 300 CAPSULE ORAL at 10:00

## 2023-08-19 RX ADMIN — METOPROLOL SUCCINATE 25 MG: 25 TABLET, FILM COATED, EXTENDED RELEASE ORAL at 10:00

## 2023-08-19 RX ADMIN — GABAPENTIN 300 MG: 300 CAPSULE ORAL at 20:44

## 2023-08-19 RX ADMIN — MICONAZOLE NITRATE: 20 CREAM TOPICAL at 20:44

## 2023-08-19 RX ADMIN — SODIUM CHLORIDE, PRESERVATIVE FREE 10 ML: 5 INJECTION INTRAVENOUS at 20:44

## 2023-08-19 RX ADMIN — GABAPENTIN 300 MG: 300 CAPSULE ORAL at 13:23

## 2023-08-19 NOTE — PLAN OF CARE
Problem: Discharge Planning  Goal: Discharge to home or other facility with appropriate resources  8/18/2023 2314 by Edna Galvin RN  Outcome: Progressing  8/18/2023 1332 by Wang Lowe RN  Outcome: Progressing  Flowsheets (Taken 8/18/2023 0010 by Corby Leiva RN)  Discharge to home or other facility with appropriate resources:   Identify barriers to discharge with patient and caregiver   Identify discharge learning needs (meds, wound care, etc)   Arrange for needed discharge resources and transportation as appropriate   Refer to discharge planning if patient needs post-hospital services based on physician order or complex needs related to functional status, cognitive ability or social support system     Problem: Safety - Adult  Goal: Free from fall injury  8/18/2023 2314 by Edna Galvin RN  Outcome: Progressing  8/18/2023 1332 by Wang Lowe RN  Outcome: Progressing

## 2023-08-19 NOTE — PROGRESS NOTES
Bedside shift change report given to Arnold Hughes LPN (oncoming nurse) by Henrique Ceron  (offgoing nurse). Report included the following information Nurse Handoff Report, Intake/Output, MAR, Recent Results, and Cardiac Rhythm NSR .    2200H RN instructed pt regarding fluid restriction of 1800ml per day, pt verbalized \"if I'm thirsty I will drink what I want\". RN explained the importance of fluid restriction as part of her treatment plan. Pt stated \"I will still drink and okay I will let you know\"    2257H RN took a picture of pt buttocks (noted with excoriation MASD on mid). Permission obtained to patient. Pt slept most of the night. Q2H turns done. Pt had good urine output. Labs drawn. At handoff report, pt is sleeping in bed with visible rise and fall of chest.on NC at 2LPM  With external catheter in placed.

## 2023-08-19 NOTE — PROGRESS NOTES
critical care TIME:  55  Minutes    Total CRITICAL CARE TIME Spent:   Minutes non procedure based      Comments   >50% of visit spent in counseling and coordination of care     ________________________________________________________________________  Pablito Fitzgerald MD     Procedures: see electronic medical records for all procedures/Xrays and details which were not copied into this note but were reviewed prior to creation of Plan. LABS:  I reviewed today's most current labs and imaging studies. Pertinent labs include:  Recent Labs     08/17/23 2244 08/18/23  0450 08/19/23  0323   WBC 11.0 10.0 9.5   HGB 15.2 13.5 13.8   HCT 49.3* 44.0 44.6    212 196     Recent Labs     08/17/23 2244 08/18/23 0450 08/19/23  0323    139 136   K 3.8 3.8 4.3    107 103   CO2 28 30 33*   GLUCOSE 124* 123* 99   BUN 11 11 10   CREATININE 1.27* 1.12* 0.93   CALCIUM 8.5 8.1* 8.1*   MG 2.2  --   --    LABALBU 3.0*  --   --    BILITOT 0.3  --   --    AST 12*  --   --    ALT 12  --   --        Signed:  Pablito Fitzgerald MD

## 2023-08-20 LAB
ANION GAP SERPL CALC-SCNC: 2 MMOL/L (ref 5–15)
BASOPHILS # BLD: 0 K/UL (ref 0–0.1)
BASOPHILS NFR BLD: 0 % (ref 0–1)
BUN SERPL-MCNC: 9 MG/DL (ref 6–20)
BUN/CREAT SERPL: 10 (ref 12–20)
CALCIUM SERPL-MCNC: 8.2 MG/DL (ref 8.5–10.1)
CHLORIDE SERPL-SCNC: 101 MMOL/L (ref 97–108)
CO2 SERPL-SCNC: 31 MMOL/L (ref 21–32)
CREAT SERPL-MCNC: 0.92 MG/DL (ref 0.55–1.02)
DIFFERENTIAL METHOD BLD: ABNORMAL
EOSINOPHIL # BLD: 0.1 K/UL (ref 0–0.4)
EOSINOPHIL NFR BLD: 1 % (ref 0–7)
ERYTHROCYTE [DISTWIDTH] IN BLOOD BY AUTOMATED COUNT: 16.4 % (ref 11.5–14.5)
GLUCOSE SERPL-MCNC: 91 MG/DL (ref 65–100)
HCT VFR BLD AUTO: 45.5 % (ref 35–47)
HGB BLD-MCNC: 13.8 G/DL (ref 11.5–16)
IMM GRANULOCYTES # BLD AUTO: 0.1 K/UL (ref 0–0.04)
IMM GRANULOCYTES NFR BLD AUTO: 1 % (ref 0–0.5)
LYMPHOCYTES # BLD: 1.8 K/UL (ref 0.8–3.5)
LYMPHOCYTES NFR BLD: 21 % (ref 12–49)
MCH RBC QN AUTO: 29.4 PG (ref 26–34)
MCHC RBC AUTO-ENTMCNC: 30.3 G/DL (ref 30–36.5)
MCV RBC AUTO: 96.8 FL (ref 80–99)
MONOCYTES # BLD: 0.4 K/UL (ref 0–1)
MONOCYTES NFR BLD: 4 % (ref 5–13)
NEUTS SEG # BLD: 6.6 K/UL (ref 1.8–8)
NEUTS SEG NFR BLD: 73 % (ref 32–75)
NRBC # BLD: 0 K/UL (ref 0–0.01)
NRBC BLD-RTO: 0 PER 100 WBC
PLATELET # BLD AUTO: 213 K/UL (ref 150–400)
PMV BLD AUTO: 10.1 FL (ref 8.9–12.9)
POTASSIUM SERPL-SCNC: 4 MMOL/L (ref 3.5–5.1)
RBC # BLD AUTO: 4.7 M/UL (ref 3.8–5.2)
SODIUM SERPL-SCNC: 134 MMOL/L (ref 136–145)
WBC # BLD AUTO: 9 K/UL (ref 3.6–11)

## 2023-08-20 PROCEDURE — 2700000000 HC OXYGEN THERAPY PER DAY

## 2023-08-20 PROCEDURE — 6360000002 HC RX W HCPCS: Performed by: STUDENT IN AN ORGANIZED HEALTH CARE EDUCATION/TRAINING PROGRAM

## 2023-08-20 PROCEDURE — 1100000003 HC PRIVATE W/ TELEMETRY

## 2023-08-20 PROCEDURE — 6370000000 HC RX 637 (ALT 250 FOR IP): Performed by: STUDENT IN AN ORGANIZED HEALTH CARE EDUCATION/TRAINING PROGRAM

## 2023-08-20 PROCEDURE — 97530 THERAPEUTIC ACTIVITIES: CPT

## 2023-08-20 PROCEDURE — 2500000003 HC RX 250 WO HCPCS: Performed by: STUDENT IN AN ORGANIZED HEALTH CARE EDUCATION/TRAINING PROGRAM

## 2023-08-20 PROCEDURE — 2580000003 HC RX 258: Performed by: STUDENT IN AN ORGANIZED HEALTH CARE EDUCATION/TRAINING PROGRAM

## 2023-08-20 PROCEDURE — 97535 SELF CARE MNGMENT TRAINING: CPT

## 2023-08-20 PROCEDURE — 85025 COMPLETE CBC W/AUTO DIFF WBC: CPT

## 2023-08-20 PROCEDURE — 80048 BASIC METABOLIC PNL TOTAL CA: CPT

## 2023-08-20 PROCEDURE — 97165 OT EVAL LOW COMPLEX 30 MIN: CPT

## 2023-08-20 PROCEDURE — 97161 PT EVAL LOW COMPLEX 20 MIN: CPT

## 2023-08-20 PROCEDURE — 36415 COLL VENOUS BLD VENIPUNCTURE: CPT

## 2023-08-20 RX ADMIN — GABAPENTIN 300 MG: 300 CAPSULE ORAL at 21:32

## 2023-08-20 RX ADMIN — BUMETANIDE 1 MG: 0.25 INJECTION INTRAMUSCULAR; INTRAVENOUS at 15:54

## 2023-08-20 RX ADMIN — METOPROLOL SUCCINATE 25 MG: 25 TABLET, FILM COATED, EXTENDED RELEASE ORAL at 10:30

## 2023-08-20 RX ADMIN — MICONAZOLE NITRATE: 20 CREAM TOPICAL at 21:38

## 2023-08-20 RX ADMIN — MICONAZOLE NITRATE: 20 CREAM TOPICAL at 10:30

## 2023-08-20 RX ADMIN — GABAPENTIN 300 MG: 300 CAPSULE ORAL at 10:30

## 2023-08-20 RX ADMIN — SODIUM CHLORIDE, PRESERVATIVE FREE 10 ML: 5 INJECTION INTRAVENOUS at 21:33

## 2023-08-20 RX ADMIN — ENOXAPARIN SODIUM 40 MG: 100 INJECTION SUBCUTANEOUS at 10:30

## 2023-08-20 RX ADMIN — ENOXAPARIN SODIUM 40 MG: 100 INJECTION SUBCUTANEOUS at 21:33

## 2023-08-20 RX ADMIN — GABAPENTIN 300 MG: 300 CAPSULE ORAL at 14:14

## 2023-08-20 RX ADMIN — PANTOPRAZOLE SODIUM 40 MG: 40 TABLET, DELAYED RELEASE ORAL at 10:30

## 2023-08-20 RX ADMIN — LEVOTHYROXINE SODIUM 75 MCG: 0.07 TABLET ORAL at 06:10

## 2023-08-20 RX ADMIN — SODIUM CHLORIDE, PRESERVATIVE FREE 10 ML: 5 INJECTION INTRAVENOUS at 10:30

## 2023-08-20 RX ADMIN — BUMETANIDE 1 MG: 0.25 INJECTION INTRAMUSCULAR; INTRAVENOUS at 06:36

## 2023-08-20 NOTE — PROGRESS NOTES
8031R RN informed NP Melanie Reece reg pt  tele order. 0340H As per NP, obtain orders from Morning provider.

## 2023-08-20 NOTE — PROGRESS NOTES
Hospitalist Progress Note    NAME:   Destini Rivera   : 1980   MRN: [id number]     Date/Time: 2023 10:04 AM  Patient PCP: Gabriela Murphy    Estimated discharge date:  Barriers: Diuresis, PT OT, oxygen weaned down      Assessment / Plan: Active Problems:  Acute hypoxemic respiratory failure  New onset heart failure  Essential hypertension  Suspect underlying COPD  Tobacco abuse  Obesity class III by BMI 62.34  Suspect MICHELET/OHS  Peripheral neuropathy  GERD     Plan:  Acute hypoxemic respiratory failure  New onset heart failure  Essential hypertension  Labs: Lipids, hemoglobin A1c, TSH  magnesium-replace electrolytes as needed  Continue PTA metoprolol  Echo done and showed diastolic dysfunction:   Left Ventricle: Normal left ventricular systolic function with a visually estimated EF of 55 - 60%. Left ventricle size is normal. Increased wall thickness. Normal wall motion. Continue with diuresis  PT/OT  6 minute walk test      Suspect underlying COPD  Tobacco abuse  5 minutes tobacco cessation counseling provided with emphasis on adverse cardiopulmonary effects of ongoing tobacco use. After discussion patient reports \"I do not like anyone to tell me to do and \"if I want to. \"  Declines nicotine replacement therapy     Obesity class III by BMI 62.34  Suspect MICHELET/OHS  Recommend medically assisted weight loss in outpatient setting  Recommend outpatient sleep study     Peripheral neuropathy  Continue PTA gabapentin     GERD  Continue PTA Protonix       Medical Decision Making:   I personally reviewed labs:CBC, BMP   I personally reviewed imaging:yes  I personally reviewed EKG:yes  Toxic drug monitoring: yes  Discussed case with: PT,RN, CM        Code Status: Full code  DVT Prophylaxis: Lovenox  GI Prophylaxis: Protonix    Subjective:     Chief Complaint / Reason for Physician Visit  \"Patient seen and examined today, worked with physical therapy today out of bed to chair she feels better had good urine

## 2023-08-20 NOTE — PROGRESS NOTES
Bedside shift change report given to Amina Santana LPN  (oncoming nurse) by Deidre Fonseca RN  (offgoing nurse). Report included the following information Nurse Handoff Report, Intake/Output, MAR, and Cardiac Rhythm NSR . Pt had a calm night. Incontinence and pat care done. Good urine output.      At handoff report, pt is sleeping in bed with visible rise and fall of chest.

## 2023-08-21 VITALS
HEART RATE: 82 BPM | RESPIRATION RATE: 22 BRPM | DIASTOLIC BLOOD PRESSURE: 63 MMHG | HEIGHT: 62 IN | BODY MASS INDEX: 53.92 KG/M2 | WEIGHT: 293 LBS | SYSTOLIC BLOOD PRESSURE: 105 MMHG | OXYGEN SATURATION: 94 % | TEMPERATURE: 97.5 F

## 2023-08-21 LAB
ANION GAP SERPL CALC-SCNC: 1 MMOL/L (ref 5–15)
BASOPHILS # BLD: 0 K/UL (ref 0–0.1)
BASOPHILS NFR BLD: 0 % (ref 0–1)
BUN SERPL-MCNC: 14 MG/DL (ref 6–20)
BUN/CREAT SERPL: 14 (ref 12–20)
CALCIUM SERPL-MCNC: 8.5 MG/DL (ref 8.5–10.1)
CHLORIDE SERPL-SCNC: 102 MMOL/L (ref 97–108)
CO2 SERPL-SCNC: 32 MMOL/L (ref 21–32)
CREAT SERPL-MCNC: 0.98 MG/DL (ref 0.55–1.02)
DIFFERENTIAL METHOD BLD: ABNORMAL
EOSINOPHIL # BLD: 0.2 K/UL (ref 0–0.4)
EOSINOPHIL NFR BLD: 2 % (ref 0–7)
ERYTHROCYTE [DISTWIDTH] IN BLOOD BY AUTOMATED COUNT: 16.5 % (ref 11.5–14.5)
GLUCOSE SERPL-MCNC: 113 MG/DL (ref 65–100)
HCT VFR BLD AUTO: 45.9 % (ref 35–47)
HGB BLD-MCNC: 14.2 G/DL (ref 11.5–16)
IMM GRANULOCYTES # BLD AUTO: 0.1 K/UL (ref 0–0.04)
IMM GRANULOCYTES NFR BLD AUTO: 1 % (ref 0–0.5)
LYMPHOCYTES # BLD: 1.5 K/UL (ref 0.8–3.5)
LYMPHOCYTES NFR BLD: 17 % (ref 12–49)
MAGNESIUM SERPL-MCNC: 2.3 MG/DL (ref 1.6–2.4)
MCH RBC QN AUTO: 29.8 PG (ref 26–34)
MCHC RBC AUTO-ENTMCNC: 30.9 G/DL (ref 30–36.5)
MCV RBC AUTO: 96.2 FL (ref 80–99)
MONOCYTES # BLD: 0.4 K/UL (ref 0–1)
MONOCYTES NFR BLD: 5 % (ref 5–13)
NEUTS SEG # BLD: 6.5 K/UL (ref 1.8–8)
NEUTS SEG NFR BLD: 75 % (ref 32–75)
NRBC # BLD: 0 K/UL (ref 0–0.01)
NRBC BLD-RTO: 0 PER 100 WBC
PLATELET # BLD AUTO: 205 K/UL (ref 150–400)
PMV BLD AUTO: 9.8 FL (ref 8.9–12.9)
POTASSIUM SERPL-SCNC: 4.3 MMOL/L (ref 3.5–5.1)
RBC # BLD AUTO: 4.77 M/UL (ref 3.8–5.2)
SODIUM SERPL-SCNC: 135 MMOL/L (ref 136–145)
WBC # BLD AUTO: 8.6 K/UL (ref 3.6–11)

## 2023-08-21 PROCEDURE — 85025 COMPLETE CBC W/AUTO DIFF WBC: CPT

## 2023-08-21 PROCEDURE — 36415 COLL VENOUS BLD VENIPUNCTURE: CPT

## 2023-08-21 PROCEDURE — 6360000002 HC RX W HCPCS: Performed by: STUDENT IN AN ORGANIZED HEALTH CARE EDUCATION/TRAINING PROGRAM

## 2023-08-21 PROCEDURE — 6370000000 HC RX 637 (ALT 250 FOR IP): Performed by: STUDENT IN AN ORGANIZED HEALTH CARE EDUCATION/TRAINING PROGRAM

## 2023-08-21 PROCEDURE — 83735 ASSAY OF MAGNESIUM: CPT

## 2023-08-21 PROCEDURE — 2700000000 HC OXYGEN THERAPY PER DAY

## 2023-08-21 PROCEDURE — 80048 BASIC METABOLIC PNL TOTAL CA: CPT

## 2023-08-21 PROCEDURE — 94760 N-INVAS EAR/PLS OXIMETRY 1: CPT

## 2023-08-21 PROCEDURE — 2500000003 HC RX 250 WO HCPCS: Performed by: STUDENT IN AN ORGANIZED HEALTH CARE EDUCATION/TRAINING PROGRAM

## 2023-08-21 RX ORDER — ENOXAPARIN SODIUM 100 MG/ML
30 INJECTION SUBCUTANEOUS 2 TIMES DAILY
Status: DISCONTINUED | OUTPATIENT
Start: 2023-08-21 | End: 2023-08-21 | Stop reason: HOSPADM

## 2023-08-21 RX ORDER — BUMETANIDE 1 MG/1
1 TABLET ORAL 2 TIMES DAILY
Qty: 30 TABLET | Refills: 0 | Status: SHIPPED | OUTPATIENT
Start: 2023-08-21

## 2023-08-21 RX ADMIN — MICONAZOLE NITRATE: 20 CREAM TOPICAL at 10:13

## 2023-08-21 RX ADMIN — BUMETANIDE 1 MG: 0.25 INJECTION INTRAMUSCULAR; INTRAVENOUS at 06:09

## 2023-08-21 RX ADMIN — GABAPENTIN 300 MG: 300 CAPSULE ORAL at 10:10

## 2023-08-21 RX ADMIN — METOPROLOL SUCCINATE 25 MG: 25 TABLET, FILM COATED, EXTENDED RELEASE ORAL at 10:10

## 2023-08-21 RX ADMIN — ENOXAPARIN SODIUM 40 MG: 100 INJECTION SUBCUTANEOUS at 10:10

## 2023-08-21 RX ADMIN — GABAPENTIN 300 MG: 300 CAPSULE ORAL at 15:24

## 2023-08-21 RX ADMIN — BUMETANIDE 1 MG: 0.25 INJECTION INTRAMUSCULAR; INTRAVENOUS at 15:25

## 2023-08-21 RX ADMIN — LEVOTHYROXINE SODIUM 75 MCG: 0.07 TABLET ORAL at 06:10

## 2023-08-21 RX ADMIN — PANTOPRAZOLE SODIUM 40 MG: 40 TABLET, DELAYED RELEASE ORAL at 10:10

## 2023-08-21 NOTE — PROGRESS NOTES
P&T-Approved DVT Prophylaxis Dosing    Per P&T Committee-approved protocol lovenox 40 mg sq BID has been adjusted to lovenox 30 mg sq BID based on weight and renal function as shown in the table below.          Onel Sutherland, [address] [place] [address]

## 2023-08-21 NOTE — DISCHARGE SUMMARY
response.    ______________________________________________________________________  DISCHARGE SUMMARY/HOSPITAL COURSE:  for full details see H&P, daily progress notes, labs, consult notes. Acute hypoxemic respiratory failure  New onset heart failure  Essential hypertension  Labs: Lipids, hemoglobin A1c, TSH  magnesium-replace electrolytes as needed  Continue PTA metoprolol  Echo done and showed diastolic dysfunction:   Left Ventricle: Normal left ventricular systolic function with a visually estimated EF of 55 - 60%. Left ventricle size is normal. Increased wall thickness. Normal wall motion. Continue with diuresis discharged home on Bumex 1 mg 2 times a day and to follow with her PCP in 20 BMP 1 week    Suspect underlying COPD  Tobacco abuse  5 minutes tobacco cessation counseling provided with emphasis on adverse cardiopulmonary effects of ongoing tobacco use. After discussion patient reports \"I do not like anyone to tell me to do and \"if I want to. \"  Declines nicotine replacement therapy     Obesity class III by BMI 62.34  Suspect MICHELET/OHS  Recommend medically assisted weight loss in outpatient setting  Recommend outpatient sleep study     Peripheral neuropathy  Continue PTA gabapentin     GERD  Continue PTA Protonix      _______________________________________________________________________  Patient seen and examined by me on discharge day. Pertinent Findings:  Gen:    Not in distress  Chest: Clear lungs  CVS:   Regular rhythm.   No edema  Abd:  Soft, not distended, not tender  Neuro:  Alert,   _______________________________________________________________________  DISCHARGE MEDICATIONS:      Medication List        START taking these medications      bumetanide 1 MG tablet  Commonly known as: Bumex  Take 1 tablet by mouth 2 times daily            CONTINUE taking these medications      gabapentin 300 MG capsule  Commonly known as: NEURONTIN     levothyroxine 75 MCG tablet  Commonly known as: SYNTHROID metoprolol succinate 25 MG extended release tablet  Commonly known as: TOPROL XL     pantoprazole 40 MG tablet  Commonly known as: PROTONIX     senna-docusate 8.6-50 MG per tablet  Commonly known as: PERICOLACE               Where to Get Your Medications        These medications were sent to [address] [place] [address], [address] [place] [address] Drive  [address], [address]      Phone: [phone]   bumetanide 1 MG tablet           Patient Follow Up Instructions: Activity: activity as tolerated  Diet: cardiac diet  Wound Care: none needed                          Follow-up with  in 1 week. Follow-up tests/labs BMP    Follow-up Information    None       ________________________________________________________________    Risk of deterioration: Low    Condition at Discharge:  Stable  __________________________________________________________________    Disposition  Home with family, no needs    ____________________________________________________________________    Code Status: Full Code  ___________________________________________________________________      Total time in minutes spent coordinating this discharge (includes going over instructions, follow-up, prescriptions, and preparing report for sign off to her PCP) :  35 minutes    Signed:   Rahel Izaguirre MD

## 2023-08-30 NOTE — PROGRESS NOTES
Physician Progress Note      PATIENT:               Ramona Maria  CSN #:                  [id number]  :                       1980  ADMIT DATE:       2023 10:25 PM  [facility] DATE:        2023 4:42 PM  RESPONDING  PROVIDER #:        Fariha Marroquin MD          QUERY TEXT:    42yoF pt admitted with respiratory failure and new onset HF. If possible,   please document in progress notes and discharge summary further specificity   regarding the type and acuity of CHF:    The medical record reflects the following:  Risk Factors: Obesity BMI 62.34 kg/m2, suspect MICHELET/OHS, HTN, suspect COPD,   tobacco abuse  Clinical Indicators: presents with worsening foot swelling for a month with   slight dyspnea on exertion and mild orthopnea. LE  edema noted, ProBNP ,607,   'Echo done and showed diastolic dysfunction' in the DC summary.  Cardiology PN:  difficulty following the fluid restriction orders. Her   lvef is wnl. Cont with diuresis. Goal K>4, Mg>2. Treatment:  diurese IV Bumex, continue Metoprolol, tele monitoring, strict   I&O, weight loss management in OP setting, discharged on Bumex. Options provided:  -- Acute Diastolic CHF/HFpEF  -- Chronic Diastolic CHF/Hope  -- Other - I will add my own diagnosis  -- Disagree - Not applicable / Not valid  -- Disagree - Clinically unable to determine / Unknown  -- Refer to Clinical Documentation Reviewer    PROVIDER RESPONSE TEXT:    This patient is in acute diastolic CHF/HFpEF. Query created by: Kiara Dejesus on 2023 11:58 AM      QUERY TEXT:    42yoF patient admitted with Acute hypoxemic respiratory failure and new onset   HF. In order to support the diagnosis of acute respiratory failure, please   include additional clinical indicators in your documentation. Or please   document if the diagnosis of acute respiratory failure has been ruled out   after further study. The medical record reflects the following:  Risk Factors:  Morbid obesity BMI 57,

## 2023-11-22 ENCOUNTER — HOSPITAL ENCOUNTER (INPATIENT)
Facility: HOSPITAL | Age: 43
LOS: 13 days | Discharge: NURSING, REHAB OR LONG-TERM CARE | DRG: 383 | End: 2023-12-05
Attending: EMERGENCY MEDICINE | Admitting: INTERNAL MEDICINE
Payer: MEDICAID

## 2023-11-22 ENCOUNTER — APPOINTMENT (OUTPATIENT)
Facility: HOSPITAL | Age: 43
DRG: 383 | End: 2023-11-22
Payer: MEDICAID

## 2023-11-22 DIAGNOSIS — L03.116 CELLULITIS OF LEFT LOWER EXTREMITY: ICD-10-CM

## 2023-11-22 DIAGNOSIS — R00.0 SINUS TACHYCARDIA: ICD-10-CM

## 2023-11-22 DIAGNOSIS — J96.01 ACUTE HYPOXEMIC RESPIRATORY FAILURE (HCC): ICD-10-CM

## 2023-11-22 DIAGNOSIS — R60.0 BILATERAL LOWER EXTREMITY EDEMA: Primary | ICD-10-CM

## 2023-11-22 PROBLEM — L03.90 CELLULITIS: Status: ACTIVE | Noted: 2023-11-22

## 2023-11-22 LAB
ANION GAP SERPL CALC-SCNC: 7 MMOL/L (ref 5–15)
BASOPHILS # BLD: 0 K/UL (ref 0–0.1)
BASOPHILS NFR BLD: 0 % (ref 0–1)
BUN SERPL-MCNC: 12 MG/DL (ref 6–20)
BUN/CREAT SERPL: 11 (ref 12–20)
CALCIUM SERPL-MCNC: 8.7 MG/DL (ref 8.5–10.1)
CHLORIDE SERPL-SCNC: 101 MMOL/L (ref 97–108)
CO2 SERPL-SCNC: 28 MMOL/L (ref 21–32)
COMMENT:: NORMAL
CREAT SERPL-MCNC: 1.08 MG/DL (ref 0.55–1.02)
DIFFERENTIAL METHOD BLD: ABNORMAL
EOSINOPHIL # BLD: 0.1 K/UL (ref 0–0.4)
EOSINOPHIL NFR BLD: 1 % (ref 0–7)
ERYTHROCYTE [DISTWIDTH] IN BLOOD BY AUTOMATED COUNT: 18.6 % (ref 11.5–14.5)
GLUCOSE SERPL-MCNC: 100 MG/DL (ref 65–100)
HCT VFR BLD AUTO: 44.9 % (ref 35–47)
HGB BLD-MCNC: 14 G/DL (ref 11.5–16)
IMM GRANULOCYTES # BLD AUTO: 0.1 K/UL (ref 0–0.04)
IMM GRANULOCYTES NFR BLD AUTO: 1 % (ref 0–0.5)
LACTATE BLD-SCNC: 1.74 MMOL/L (ref 0.4–2)
LYMPHOCYTES # BLD: 1 K/UL (ref 0.8–3.5)
LYMPHOCYTES NFR BLD: 8 % (ref 12–49)
MCH RBC QN AUTO: 30.8 PG (ref 26–34)
MCHC RBC AUTO-ENTMCNC: 31.2 G/DL (ref 30–36.5)
MCV RBC AUTO: 98.7 FL (ref 80–99)
MONOCYTES # BLD: 0.4 K/UL (ref 0–1)
MONOCYTES NFR BLD: 3 % (ref 5–13)
NEUTS SEG # BLD: 10.2 K/UL (ref 1.8–8)
NEUTS SEG NFR BLD: 87 % (ref 32–75)
NRBC # BLD: 0 K/UL (ref 0–0.01)
NRBC BLD-RTO: 0 PER 100 WBC
NT PRO BNP: 510 PG/ML
PLATELET # BLD AUTO: 157 K/UL (ref 150–400)
PMV BLD AUTO: 10.9 FL (ref 8.9–12.9)
POTASSIUM SERPL-SCNC: 4.2 MMOL/L (ref 3.5–5.1)
RBC # BLD AUTO: 4.55 M/UL (ref 3.8–5.2)
SODIUM SERPL-SCNC: 136 MMOL/L (ref 136–145)
SPECIMEN HOLD: NORMAL
TROPONIN I SERPL HS-MCNC: 17 NG/L (ref 0–51)
WBC # BLD AUTO: 11.7 K/UL (ref 3.6–11)

## 2023-11-22 PROCEDURE — 84484 ASSAY OF TROPONIN QUANT: CPT

## 2023-11-22 PROCEDURE — 6360000002 HC RX W HCPCS: Performed by: INTERNAL MEDICINE

## 2023-11-22 PROCEDURE — 83880 ASSAY OF NATRIURETIC PEPTIDE: CPT

## 2023-11-22 PROCEDURE — 80048 BASIC METABOLIC PNL TOTAL CA: CPT

## 2023-11-22 PROCEDURE — 85025 COMPLETE CBC W/AUTO DIFF WBC: CPT

## 2023-11-22 PROCEDURE — 2500000003 HC RX 250 WO HCPCS: Performed by: EMERGENCY MEDICINE

## 2023-11-22 PROCEDURE — 6370000000 HC RX 637 (ALT 250 FOR IP): Performed by: INTERNAL MEDICINE

## 2023-11-22 PROCEDURE — 93971 EXTREMITY STUDY: CPT

## 2023-11-22 PROCEDURE — 87040 BLOOD CULTURE FOR BACTERIA: CPT

## 2023-11-22 PROCEDURE — 2580000003 HC RX 258: Performed by: INTERNAL MEDICINE

## 2023-11-22 PROCEDURE — 83605 ASSAY OF LACTIC ACID: CPT

## 2023-11-22 PROCEDURE — 71045 X-RAY EXAM CHEST 1 VIEW: CPT

## 2023-11-22 PROCEDURE — 96374 THER/PROPH/DIAG INJ IV PUSH: CPT

## 2023-11-22 PROCEDURE — 6360000002 HC RX W HCPCS: Performed by: EMERGENCY MEDICINE

## 2023-11-22 PROCEDURE — 99285 EMERGENCY DEPT VISIT HI MDM: CPT

## 2023-11-22 PROCEDURE — 93005 ELECTROCARDIOGRAM TRACING: CPT | Performed by: EMERGENCY MEDICINE

## 2023-11-22 PROCEDURE — 36415 COLL VENOUS BLD VENIPUNCTURE: CPT

## 2023-11-22 PROCEDURE — 1100000003 HC PRIVATE W/ TELEMETRY

## 2023-11-22 PROCEDURE — 2580000003 HC RX 258: Performed by: EMERGENCY MEDICINE

## 2023-11-22 PROCEDURE — 6370000000 HC RX 637 (ALT 250 FOR IP): Performed by: EMERGENCY MEDICINE

## 2023-11-22 RX ORDER — POTASSIUM CHLORIDE 7.45 MG/ML
10 INJECTION INTRAVENOUS PRN
Status: DISCONTINUED | OUTPATIENT
Start: 2023-11-22 | End: 2023-12-05 | Stop reason: HOSPADM

## 2023-11-22 RX ORDER — POLYETHYLENE GLYCOL 3350 17 G/17G
17 POWDER, FOR SOLUTION ORAL DAILY PRN
Status: DISCONTINUED | OUTPATIENT
Start: 2023-11-22 | End: 2023-12-05 | Stop reason: HOSPADM

## 2023-11-22 RX ORDER — METOPROLOL SUCCINATE 25 MG/1
25 TABLET, EXTENDED RELEASE ORAL DAILY
Status: DISCONTINUED | OUTPATIENT
Start: 2023-11-23 | End: 2023-12-05 | Stop reason: HOSPADM

## 2023-11-22 RX ORDER — ENOXAPARIN SODIUM 100 MG/ML
30 INJECTION SUBCUTANEOUS 2 TIMES DAILY
Status: DISCONTINUED | OUTPATIENT
Start: 2023-11-22 | End: 2023-12-05 | Stop reason: HOSPADM

## 2023-11-22 RX ORDER — POTASSIUM CHLORIDE 20 MEQ/1
40 TABLET, EXTENDED RELEASE ORAL PRN
Status: DISCONTINUED | OUTPATIENT
Start: 2023-11-22 | End: 2023-12-05 | Stop reason: HOSPADM

## 2023-11-22 RX ORDER — ONDANSETRON 4 MG/1
4 TABLET, ORALLY DISINTEGRATING ORAL EVERY 8 HOURS PRN
Status: DISCONTINUED | OUTPATIENT
Start: 2023-11-22 | End: 2023-12-05 | Stop reason: HOSPADM

## 2023-11-22 RX ORDER — GABAPENTIN 300 MG/1
300 CAPSULE ORAL 3 TIMES DAILY
Status: DISCONTINUED | OUTPATIENT
Start: 2023-11-22 | End: 2023-12-05 | Stop reason: HOSPADM

## 2023-11-22 RX ORDER — BUMETANIDE 1 MG/1
1 TABLET ORAL 2 TIMES DAILY
Status: DISCONTINUED | OUTPATIENT
Start: 2023-11-23 | End: 2023-12-05 | Stop reason: HOSPADM

## 2023-11-22 RX ORDER — SODIUM CHLORIDE 0.9 % (FLUSH) 0.9 %
5-40 SYRINGE (ML) INJECTION PRN
Status: DISCONTINUED | OUTPATIENT
Start: 2023-11-22 | End: 2023-12-05 | Stop reason: HOSPADM

## 2023-11-22 RX ORDER — SODIUM CHLORIDE 9 MG/ML
INJECTION, SOLUTION INTRAVENOUS PRN
Status: DISCONTINUED | OUTPATIENT
Start: 2023-11-22 | End: 2023-12-05 | Stop reason: HOSPADM

## 2023-11-22 RX ORDER — OXYCODONE HYDROCHLORIDE AND ACETAMINOPHEN 5; 325 MG/1; MG/1
1 TABLET ORAL
Status: COMPLETED | OUTPATIENT
Start: 2023-11-22 | End: 2023-11-22

## 2023-11-22 RX ORDER — ACETAMINOPHEN 325 MG/1
650 TABLET ORAL EVERY 6 HOURS PRN
Status: DISCONTINUED | OUTPATIENT
Start: 2023-11-22 | End: 2023-12-05 | Stop reason: HOSPADM

## 2023-11-22 RX ORDER — GUAIFENESIN 200 MG/10ML
200 LIQUID ORAL EVERY 4 HOURS PRN
Status: DISCONTINUED | OUTPATIENT
Start: 2023-11-22 | End: 2023-12-05 | Stop reason: HOSPADM

## 2023-11-22 RX ORDER — ONDANSETRON 2 MG/ML
4 INJECTION INTRAMUSCULAR; INTRAVENOUS EVERY 6 HOURS PRN
Status: DISCONTINUED | OUTPATIENT
Start: 2023-11-22 | End: 2023-12-05 | Stop reason: HOSPADM

## 2023-11-22 RX ORDER — ACETAMINOPHEN 650 MG/1
650 SUPPOSITORY RECTAL EVERY 6 HOURS PRN
Status: DISCONTINUED | OUTPATIENT
Start: 2023-11-22 | End: 2023-12-05 | Stop reason: HOSPADM

## 2023-11-22 RX ORDER — LEVOTHYROXINE SODIUM 0.07 MG/1
75 TABLET ORAL DAILY
Status: DISCONTINUED | OUTPATIENT
Start: 2023-11-23 | End: 2023-12-05 | Stop reason: HOSPADM

## 2023-11-22 RX ORDER — CASTOR OIL AND BALSAM, PERU 788; 87 MG/G; MG/G
OINTMENT TOPICAL 2 TIMES DAILY
Status: DISCONTINUED | OUTPATIENT
Start: 2023-11-22 | End: 2023-12-05 | Stop reason: HOSPADM

## 2023-11-22 RX ORDER — MAGNESIUM SULFATE IN WATER 40 MG/ML
2000 INJECTION, SOLUTION INTRAVENOUS PRN
Status: DISCONTINUED | OUTPATIENT
Start: 2023-11-22 | End: 2023-12-05 | Stop reason: HOSPADM

## 2023-11-22 RX ORDER — PANTOPRAZOLE SODIUM 40 MG/1
40 TABLET, DELAYED RELEASE ORAL DAILY
Status: DISCONTINUED | OUTPATIENT
Start: 2023-11-23 | End: 2023-12-05 | Stop reason: HOSPADM

## 2023-11-22 RX ORDER — SODIUM CHLORIDE 0.9 % (FLUSH) 0.9 %
5-40 SYRINGE (ML) INJECTION EVERY 12 HOURS SCHEDULED
Status: DISCONTINUED | OUTPATIENT
Start: 2023-11-22 | End: 2023-12-05 | Stop reason: HOSPADM

## 2023-11-22 RX ORDER — SENNA AND DOCUSATE SODIUM 50; 8.6 MG/1; MG/1
1 TABLET, FILM COATED ORAL DAILY
Status: DISCONTINUED | OUTPATIENT
Start: 2023-11-23 | End: 2023-12-05 | Stop reason: HOSPADM

## 2023-11-22 RX ORDER — BUMETANIDE 0.25 MG/ML
0.5 INJECTION INTRAMUSCULAR; INTRAVENOUS ONCE
Status: COMPLETED | OUTPATIENT
Start: 2023-11-22 | End: 2023-11-22

## 2023-11-22 RX ADMIN — PIPERACILLIN AND TAZOBACTAM 3375 MG: 3; .375 INJECTION, POWDER, LYOPHILIZED, FOR SOLUTION INTRAVENOUS at 21:01

## 2023-11-22 RX ADMIN — PIPERACILLIN AND TAZOBACTAM 4500 MG: 4; .5 INJECTION, POWDER, LYOPHILIZED, FOR SOLUTION INTRAVENOUS at 16:37

## 2023-11-22 RX ADMIN — BUMETANIDE 0.5 MG: 0.25 INJECTION INTRAMUSCULAR; INTRAVENOUS at 14:08

## 2023-11-22 RX ADMIN — SODIUM CHLORIDE, PRESERVATIVE FREE 10 ML: 5 INJECTION INTRAVENOUS at 21:02

## 2023-11-22 RX ADMIN — OXYCODONE HYDROCHLORIDE AND ACETAMINOPHEN 1 TABLET: 5; 325 TABLET ORAL at 14:08

## 2023-11-22 RX ADMIN — GABAPENTIN 300 MG: 300 CAPSULE ORAL at 20:58

## 2023-11-22 RX ADMIN — CASTOR OIL AND BALSAM, PERU: 788; 87 OINTMENT TOPICAL at 21:02

## 2023-11-22 RX ADMIN — ENOXAPARIN SODIUM 30 MG: 100 INJECTION SUBCUTANEOUS at 20:58

## 2023-11-22 RX ADMIN — Medication 2500 MG: at 16:37

## 2023-11-22 ASSESSMENT — PAIN SCALES - GENERAL
PAINLEVEL_OUTOF10: 0
PAINLEVEL_OUTOF10: 4

## 2023-11-22 ASSESSMENT — PAIN DESCRIPTION - ORIENTATION: ORIENTATION: LEFT;LOWER

## 2023-11-22 ASSESSMENT — PAIN - FUNCTIONAL ASSESSMENT: PAIN_FUNCTIONAL_ASSESSMENT: 0-10

## 2023-11-22 ASSESSMENT — PAIN DESCRIPTION - LOCATION: LOCATION: LEG

## 2023-11-22 NOTE — ED PROVIDER NOTES
Findings: Erythema present. Neurological:      General: No focal deficit present.    Psychiatric:         Mood and Affect: Mood normal.          DIAGNOSTIC RESULTS   LABS:     Recent Results (from the past 24 hour(s))   CBC with Auto Differential    Collection Time: 11/22/23  1:41 PM   Result Value Ref Range    WBC 11.7 (H) 3.6 - 11.0 K/uL    RBC 4.55 3.80 - 5.20 M/uL    Hemoglobin 14.0 11.5 - 16.0 g/dL    Hematocrit 44.9 35.0 - 47.0 %    MCV 98.7 80.0 - 99.0 FL    MCH 30.8 26.0 - 34.0 PG    MCHC 31.2 30.0 - 36.5 g/dL    RDW 18.6 (H) 11.5 - 14.5 %    Platelets 402 640 - 275 K/uL    MPV 10.9 8.9 - 12.9 FL    Nucleated RBCs 0.0 0  WBC    nRBC 0.00 0.00 - 0.01 K/uL    Neutrophils % 87 (H) 32 - 75 %    Lymphocytes % 8 (L) 12 - 49 %    Monocytes % 3 (L) 5 - 13 %    Eosinophils % 1 0 - 7 %    Basophils % 0 0 - 1 %    Immature Granulocytes 1 (H) 0.0 - 0.5 %    Neutrophils Absolute 10.2 (H) 1.8 - 8.0 K/UL    Lymphocytes Absolute 1.0 0.8 - 3.5 K/UL    Monocytes Absolute 0.4 0.0 - 1.0 K/UL    Eosinophils Absolute 0.1 0.0 - 0.4 K/UL    Basophils Absolute 0.0 0.0 - 0.1 K/UL    Absolute Immature Granulocyte 0.1 (H) 0.00 - 0.04 K/UL    Differential Type AUTOMATED     BMP    Collection Time: 11/22/23  1:41 PM   Result Value Ref Range    Sodium 136 136 - 145 mmol/L    Potassium 4.2 3.5 - 5.1 mmol/L    Chloride 101 97 - 108 mmol/L    CO2 28 21 - 32 mmol/L    Anion Gap 7 5 - 15 mmol/L    Glucose 100 65 - 100 mg/dL    BUN 12 6 - 20 MG/DL    Creatinine 1.08 (H) 0.55 - 1.02 MG/DL    Bun/Cre Ratio 11 (L) 12 - 20      Est, Glom Filt Rate >60 >60 ml/min/1.73m2    Calcium 8.7 8.5 - 10.1 MG/DL   Troponin    Collection Time: 11/22/23  1:41 PM   Result Value Ref Range    Troponin, High Sensitivity 17 0 - 51 ng/L   Brain Natriuretic Peptide    Collection Time: 11/22/23  1:41 PM   Result Value Ref Range    NT Pro- (H) <125 PG/ML   Extra Tubes Hold    Collection Time: 11/22/23  1:41 PM   Result Value Ref Range    Specimen HOld

## 2023-11-22 NOTE — H&P
Hospitalist Admission Note    NAME:   Corey Brock   : 1980   MRN: [id number]     Date/Time: 2023 5:31 PM    Patient PCP: Amalia Sen    ______________________________________________________________________  Given the patient's current clinical presentation, I have a high level of concern for decompensation if discharged from the emergency department. Complex decision making was performed, which includes reviewing the patient's available past medical records, laboratory results, and x-ray films. My assessment of this patient's clinical condition and my plan of care is as follows. Assessment / Plan:  Sepsis:  Left lower extremity cellulitis:  S/p vanco and zosyn in ED  Continue vanco and zosyn  Will follow blood culture      Chronic diastolic heart failure:  Continue home dose of bumex 1 mg po BID    Hypothyroidism:  Continue levothyroxine    GERD:  Continue pantoprazole                      Medical Decision Making:   I personally reviewed labs: cbc- elevated white count of 11.7,  BMP- no significant findings. Pro BNP elevated, troponin negative. I personally reviewed imaging: duplex US of lower extremity- negative for DVT, CXR- mild pulmonary edema  I personally reviewed EKG:interpreted by me- sinus rhythm with PVCs  Toxic drug monitoring: vanco level while patient is on vancomycin  Discussed case with: ED provider. After discussion I am in agreement that acuity of patient's medical condition necessitates hospital stay. Code Status: full  DVT Prophylaxis:lovenox   Baseline:     Subjective:   CHIEF COMPLAINT: left leg swelling and redness    HISTORY OF PRESENT ILLNESS:     Corey Brock is a 39 y.o.  female with PMHx significant for hypothyroidism, presented to ED with c/o left leg swelling and redness of 2 days. Denies pain. Reports subjective fevers yesterday. Patient reports that she ran out of bumex 3 weeks ago, had it refilled today and took her today's dose.

## 2023-11-23 LAB
ANION GAP SERPL CALC-SCNC: 3 MMOL/L (ref 5–15)
BUN SERPL-MCNC: 10 MG/DL (ref 6–20)
BUN/CREAT SERPL: 13 (ref 12–20)
CALCIUM SERPL-MCNC: 6.6 MG/DL (ref 8.5–10.1)
CHLORIDE SERPL-SCNC: 111 MMOL/L (ref 97–108)
CO2 SERPL-SCNC: 25 MMOL/L (ref 21–32)
CREAT SERPL-MCNC: 0.75 MG/DL (ref 0.55–1.02)
EKG ATRIAL RATE: 99 BPM
EKG DIAGNOSIS: NORMAL
EKG P AXIS: 62 DEGREES
EKG P-R INTERVAL: 130 MS
EKG Q-T INTERVAL: 348 MS
EKG QRS DURATION: 92 MS
EKG QTC CALCULATION (BAZETT): 446 MS
EKG R AXIS: 80 DEGREES
EKG T AXIS: 41 DEGREES
EKG VENTRICULAR RATE: 99 BPM
GLUCOSE SERPL-MCNC: 90 MG/DL (ref 65–100)
MAGNESIUM SERPL-MCNC: 1.8 MG/DL (ref 1.6–2.4)
POTASSIUM SERPL-SCNC: 3.4 MMOL/L (ref 3.5–5.1)
SODIUM SERPL-SCNC: 139 MMOL/L (ref 136–145)

## 2023-11-23 PROCEDURE — 2580000003 HC RX 258: Performed by: INTERNAL MEDICINE

## 2023-11-23 PROCEDURE — 6360000002 HC RX W HCPCS: Performed by: INTERNAL MEDICINE

## 2023-11-23 PROCEDURE — 80048 BASIC METABOLIC PNL TOTAL CA: CPT

## 2023-11-23 PROCEDURE — 2700000000 HC OXYGEN THERAPY PER DAY

## 2023-11-23 PROCEDURE — 83735 ASSAY OF MAGNESIUM: CPT

## 2023-11-23 PROCEDURE — 6370000000 HC RX 637 (ALT 250 FOR IP): Performed by: INTERNAL MEDICINE

## 2023-11-23 PROCEDURE — 36415 COLL VENOUS BLD VENIPUNCTURE: CPT

## 2023-11-23 PROCEDURE — 94760 N-INVAS EAR/PLS OXIMETRY 1: CPT

## 2023-11-23 PROCEDURE — 1100000003 HC PRIVATE W/ TELEMETRY

## 2023-11-23 RX ADMIN — PIPERACILLIN AND TAZOBACTAM 3375 MG: 3; .375 INJECTION, POWDER, LYOPHILIZED, FOR SOLUTION INTRAVENOUS at 06:23

## 2023-11-23 RX ADMIN — ENOXAPARIN SODIUM 30 MG: 100 INJECTION SUBCUTANEOUS at 22:30

## 2023-11-23 RX ADMIN — CASTOR OIL AND BALSAM, PERU: 788; 87 OINTMENT TOPICAL at 22:31

## 2023-11-23 RX ADMIN — GABAPENTIN 300 MG: 300 CAPSULE ORAL at 22:30

## 2023-11-23 RX ADMIN — GABAPENTIN 300 MG: 300 CAPSULE ORAL at 16:12

## 2023-11-23 RX ADMIN — DOCUSATE SODIUM 50 MG AND SENNOSIDES 8.6 MG 1 TABLET: 8.6; 5 TABLET, FILM COATED ORAL at 10:46

## 2023-11-23 RX ADMIN — CASTOR OIL AND BALSAM, PERU: 788; 87 OINTMENT TOPICAL at 10:48

## 2023-11-23 RX ADMIN — ENOXAPARIN SODIUM 30 MG: 100 INJECTION SUBCUTANEOUS at 10:46

## 2023-11-23 RX ADMIN — PIPERACILLIN AND TAZOBACTAM 3375 MG: 3; .375 INJECTION, POWDER, LYOPHILIZED, FOR SOLUTION INTRAVENOUS at 16:12

## 2023-11-23 RX ADMIN — SODIUM CHLORIDE, PRESERVATIVE FREE 10 ML: 5 INJECTION INTRAVENOUS at 10:48

## 2023-11-23 RX ADMIN — LEVOTHYROXINE SODIUM 75 MCG: 0.07 TABLET ORAL at 06:24

## 2023-11-23 RX ADMIN — VANCOMYCIN HYDROCHLORIDE 750 MG: 750 INJECTION, POWDER, LYOPHILIZED, FOR SOLUTION INTRAVENOUS at 16:13

## 2023-11-23 RX ADMIN — PIPERACILLIN AND TAZOBACTAM 3375 MG: 3; .375 INJECTION, POWDER, LYOPHILIZED, FOR SOLUTION INTRAVENOUS at 22:30

## 2023-11-23 RX ADMIN — VANCOMYCIN HYDROCHLORIDE 750 MG: 750 INJECTION, POWDER, LYOPHILIZED, FOR SOLUTION INTRAVENOUS at 06:20

## 2023-11-23 RX ADMIN — PANTOPRAZOLE SODIUM 40 MG: 40 TABLET, DELAYED RELEASE ORAL at 10:46

## 2023-11-23 RX ADMIN — SODIUM CHLORIDE, PRESERVATIVE FREE 10 ML: 5 INJECTION INTRAVENOUS at 22:31

## 2023-11-23 RX ADMIN — BUMETANIDE 1 MG: 1 TABLET ORAL at 10:46

## 2023-11-23 RX ADMIN — GABAPENTIN 300 MG: 300 CAPSULE ORAL at 10:47

## 2023-11-23 RX ADMIN — BUMETANIDE 1 MG: 1 TABLET ORAL at 22:30

## 2023-11-23 ASSESSMENT — PAIN SCALES - GENERAL
PAINLEVEL_OUTOF10: 0
PAINLEVEL_OUTOF10: 0

## 2023-11-24 ENCOUNTER — APPOINTMENT (OUTPATIENT)
Facility: HOSPITAL | Age: 43
DRG: 383 | End: 2023-11-24
Payer: MEDICAID

## 2023-11-24 LAB
ANION GAP SERPL CALC-SCNC: 4 MMOL/L (ref 5–15)
BUN SERPL-MCNC: 9 MG/DL (ref 6–20)
BUN/CREAT SERPL: 10 (ref 12–20)
CALCIUM SERPL-MCNC: 8.2 MG/DL (ref 8.5–10.1)
CHLORIDE SERPL-SCNC: 102 MMOL/L (ref 97–108)
CO2 SERPL-SCNC: 29 MMOL/L (ref 21–32)
CREAT SERPL-MCNC: 0.93 MG/DL (ref 0.55–1.02)
GLUCOSE SERPL-MCNC: 105 MG/DL (ref 65–100)
MAGNESIUM SERPL-MCNC: 2.2 MG/DL (ref 1.6–2.4)
POTASSIUM SERPL-SCNC: 3.2 MMOL/L (ref 3.5–5.1)
SODIUM SERPL-SCNC: 135 MMOL/L (ref 136–145)

## 2023-11-24 PROCEDURE — 73701 CT LOWER EXTREMITY W/DYE: CPT

## 2023-11-24 PROCEDURE — 36415 COLL VENOUS BLD VENIPUNCTURE: CPT

## 2023-11-24 PROCEDURE — 80048 BASIC METABOLIC PNL TOTAL CA: CPT

## 2023-11-24 PROCEDURE — 6360000002 HC RX W HCPCS: Performed by: INTERNAL MEDICINE

## 2023-11-24 PROCEDURE — 94760 N-INVAS EAR/PLS OXIMETRY 1: CPT

## 2023-11-24 PROCEDURE — 6370000000 HC RX 637 (ALT 250 FOR IP): Performed by: INTERNAL MEDICINE

## 2023-11-24 PROCEDURE — 83735 ASSAY OF MAGNESIUM: CPT

## 2023-11-24 PROCEDURE — 2580000003 HC RX 258: Performed by: INTERNAL MEDICINE

## 2023-11-24 PROCEDURE — 1100000003 HC PRIVATE W/ TELEMETRY

## 2023-11-24 PROCEDURE — 2700000000 HC OXYGEN THERAPY PER DAY

## 2023-11-24 PROCEDURE — 6360000004 HC RX CONTRAST MEDICATION

## 2023-11-24 PROCEDURE — 6370000000 HC RX 637 (ALT 250 FOR IP): Performed by: NURSE PRACTITIONER

## 2023-11-24 RX ORDER — POTASSIUM CHLORIDE 20 MEQ/1
40 TABLET, EXTENDED RELEASE ORAL ONCE
Status: COMPLETED | OUTPATIENT
Start: 2023-11-24 | End: 2023-11-24

## 2023-11-24 RX ADMIN — PIPERACILLIN AND TAZOBACTAM 3375 MG: 3; .375 INJECTION, POWDER, LYOPHILIZED, FOR SOLUTION INTRAVENOUS at 14:16

## 2023-11-24 RX ADMIN — PANTOPRAZOLE SODIUM 40 MG: 40 TABLET, DELAYED RELEASE ORAL at 09:43

## 2023-11-24 RX ADMIN — POTASSIUM CHLORIDE 40 MEQ: 1500 TABLET, EXTENDED RELEASE ORAL at 05:41

## 2023-11-24 RX ADMIN — GABAPENTIN 300 MG: 300 CAPSULE ORAL at 09:43

## 2023-11-24 RX ADMIN — GABAPENTIN 300 MG: 300 CAPSULE ORAL at 14:16

## 2023-11-24 RX ADMIN — ENOXAPARIN SODIUM 30 MG: 100 INJECTION SUBCUTANEOUS at 23:02

## 2023-11-24 RX ADMIN — DOCUSATE SODIUM 50 MG AND SENNOSIDES 8.6 MG 1 TABLET: 8.6; 5 TABLET, FILM COATED ORAL at 09:43

## 2023-11-24 RX ADMIN — BUMETANIDE 1 MG: 1 TABLET ORAL at 09:43

## 2023-11-24 RX ADMIN — CASTOR OIL AND BALSAM, PERU: 788; 87 OINTMENT TOPICAL at 09:45

## 2023-11-24 RX ADMIN — IOPAMIDOL 100 ML: 755 INJECTION, SOLUTION INTRAVENOUS at 15:59

## 2023-11-24 RX ADMIN — ENOXAPARIN SODIUM 30 MG: 100 INJECTION SUBCUTANEOUS at 09:43

## 2023-11-24 RX ADMIN — CASTOR OIL AND BALSAM, PERU: 788; 87 OINTMENT TOPICAL at 23:04

## 2023-11-24 RX ADMIN — ZINC OXIDE: 400 PASTE TOPICAL at 09:45

## 2023-11-24 RX ADMIN — VANCOMYCIN HYDROCHLORIDE 750 MG: 750 INJECTION, POWDER, LYOPHILIZED, FOR SOLUTION INTRAVENOUS at 16:34

## 2023-11-24 RX ADMIN — GABAPENTIN 300 MG: 300 CAPSULE ORAL at 23:02

## 2023-11-24 RX ADMIN — VANCOMYCIN HYDROCHLORIDE 750 MG: 750 INJECTION, POWDER, LYOPHILIZED, FOR SOLUTION INTRAVENOUS at 04:29

## 2023-11-24 RX ADMIN — BUMETANIDE 1 MG: 1 TABLET ORAL at 23:02

## 2023-11-24 RX ADMIN — LEVOTHYROXINE SODIUM 75 MCG: 0.07 TABLET ORAL at 05:41

## 2023-11-24 RX ADMIN — PIPERACILLIN AND TAZOBACTAM 3375 MG: 3; .375 INJECTION, POWDER, LYOPHILIZED, FOR SOLUTION INTRAVENOUS at 05:41

## 2023-11-24 RX ADMIN — SODIUM CHLORIDE, PRESERVATIVE FREE 10 ML: 5 INJECTION INTRAVENOUS at 23:02

## 2023-11-24 RX ADMIN — SODIUM CHLORIDE, PRESERVATIVE FREE 10 ML: 5 INJECTION INTRAVENOUS at 09:43

## 2023-11-24 RX ADMIN — PIPERACILLIN AND TAZOBACTAM 3375 MG: 3; .375 INJECTION, POWDER, LYOPHILIZED, FOR SOLUTION INTRAVENOUS at 23:01

## 2023-11-24 ASSESSMENT — PAIN SCALES - GENERAL: PAINLEVEL_OUTOF10: 0

## 2023-11-24 NOTE — CARE COORDINATION
Chart reviewed. Attempted to complete assessment. Pt currently LOTUS for CT. Will defer and continue to follow.     Garcia Whalen MSW   Care Manager, [facility] [place] [facility]

## 2023-11-25 LAB
ANION GAP SERPL CALC-SCNC: 2 MMOL/L (ref 5–15)
BUN SERPL-MCNC: 10 MG/DL (ref 6–20)
BUN/CREAT SERPL: 12 (ref 12–20)
CALCIUM SERPL-MCNC: 8.2 MG/DL (ref 8.5–10.1)
CHLORIDE SERPL-SCNC: 102 MMOL/L (ref 97–108)
CO2 SERPL-SCNC: 32 MMOL/L (ref 21–32)
COMMENT:: NORMAL
CREAT SERPL-MCNC: 0.85 MG/DL (ref 0.55–1.02)
GLUCOSE SERPL-MCNC: 127 MG/DL (ref 65–100)
POTASSIUM SERPL-SCNC: 3.6 MMOL/L (ref 3.5–5.1)
SODIUM SERPL-SCNC: 136 MMOL/L (ref 136–145)
SPECIMEN HOLD: NORMAL
VANCOMYCIN SERPL-MCNC: 9 UG/ML

## 2023-11-25 PROCEDURE — 2580000003 HC RX 258: Performed by: STUDENT IN AN ORGANIZED HEALTH CARE EDUCATION/TRAINING PROGRAM

## 2023-11-25 PROCEDURE — 2700000000 HC OXYGEN THERAPY PER DAY

## 2023-11-25 PROCEDURE — 36415 COLL VENOUS BLD VENIPUNCTURE: CPT

## 2023-11-25 PROCEDURE — 6360000002 HC RX W HCPCS: Performed by: STUDENT IN AN ORGANIZED HEALTH CARE EDUCATION/TRAINING PROGRAM

## 2023-11-25 PROCEDURE — 94760 N-INVAS EAR/PLS OXIMETRY 1: CPT

## 2023-11-25 PROCEDURE — 2580000003 HC RX 258: Performed by: INTERNAL MEDICINE

## 2023-11-25 PROCEDURE — 6370000000 HC RX 637 (ALT 250 FOR IP): Performed by: INTERNAL MEDICINE

## 2023-11-25 PROCEDURE — 1100000003 HC PRIVATE W/ TELEMETRY

## 2023-11-25 PROCEDURE — 6360000002 HC RX W HCPCS: Performed by: INTERNAL MEDICINE

## 2023-11-25 PROCEDURE — 80202 ASSAY OF VANCOMYCIN: CPT

## 2023-11-25 PROCEDURE — 80048 BASIC METABOLIC PNL TOTAL CA: CPT

## 2023-11-25 RX ADMIN — ENOXAPARIN SODIUM 30 MG: 100 INJECTION SUBCUTANEOUS at 21:08

## 2023-11-25 RX ADMIN — SODIUM CHLORIDE, PRESERVATIVE FREE 10 ML: 5 INJECTION INTRAVENOUS at 08:48

## 2023-11-25 RX ADMIN — CASTOR OIL AND BALSAM, PERU: 788; 87 OINTMENT TOPICAL at 21:09

## 2023-11-25 RX ADMIN — BUMETANIDE 1 MG: 1 TABLET ORAL at 08:48

## 2023-11-25 RX ADMIN — PANTOPRAZOLE SODIUM 40 MG: 40 TABLET, DELAYED RELEASE ORAL at 08:48

## 2023-11-25 RX ADMIN — PIPERACILLIN AND TAZOBACTAM 3375 MG: 3; .375 INJECTION, POWDER, LYOPHILIZED, FOR SOLUTION INTRAVENOUS at 06:55

## 2023-11-25 RX ADMIN — VANCOMYCIN HYDROCHLORIDE 750 MG: 750 INJECTION, POWDER, LYOPHILIZED, FOR SOLUTION INTRAVENOUS at 05:13

## 2023-11-25 RX ADMIN — BUMETANIDE 1 MG: 1 TABLET ORAL at 21:08

## 2023-11-25 RX ADMIN — GABAPENTIN 300 MG: 300 CAPSULE ORAL at 08:48

## 2023-11-25 RX ADMIN — GABAPENTIN 300 MG: 300 CAPSULE ORAL at 21:08

## 2023-11-25 RX ADMIN — VANCOMYCIN HYDROCHLORIDE 1250 MG: 10 INJECTION, POWDER, LYOPHILIZED, FOR SOLUTION INTRAVENOUS at 21:08

## 2023-11-25 RX ADMIN — GABAPENTIN 300 MG: 300 CAPSULE ORAL at 14:05

## 2023-11-25 RX ADMIN — ENOXAPARIN SODIUM 30 MG: 100 INJECTION SUBCUTANEOUS at 08:48

## 2023-11-25 RX ADMIN — PIPERACILLIN AND TAZOBACTAM 3375 MG: 3; .375 INJECTION, POWDER, LYOPHILIZED, FOR SOLUTION INTRAVENOUS at 15:56

## 2023-11-25 RX ADMIN — CASTOR OIL AND BALSAM, PERU: 788; 87 OINTMENT TOPICAL at 08:49

## 2023-11-25 RX ADMIN — LEVOTHYROXINE SODIUM 75 MCG: 0.07 TABLET ORAL at 07:01

## 2023-11-26 LAB
ANION GAP SERPL CALC-SCNC: 3 MMOL/L (ref 5–15)
BUN SERPL-MCNC: 8 MG/DL (ref 6–20)
BUN/CREAT SERPL: 9 (ref 12–20)
CALCIUM SERPL-MCNC: 8.1 MG/DL (ref 8.5–10.1)
CHLORIDE SERPL-SCNC: 100 MMOL/L (ref 97–108)
CO2 SERPL-SCNC: 34 MMOL/L (ref 21–32)
CREAT SERPL-MCNC: 0.91 MG/DL (ref 0.55–1.02)
GLUCOSE SERPL-MCNC: 108 MG/DL (ref 65–100)
POTASSIUM SERPL-SCNC: 3.8 MMOL/L (ref 3.5–5.1)
SODIUM SERPL-SCNC: 137 MMOL/L (ref 136–145)

## 2023-11-26 PROCEDURE — 6360000002 HC RX W HCPCS: Performed by: INTERNAL MEDICINE

## 2023-11-26 PROCEDURE — 94760 N-INVAS EAR/PLS OXIMETRY 1: CPT

## 2023-11-26 PROCEDURE — 6360000002 HC RX W HCPCS: Performed by: STUDENT IN AN ORGANIZED HEALTH CARE EDUCATION/TRAINING PROGRAM

## 2023-11-26 PROCEDURE — 80048 BASIC METABOLIC PNL TOTAL CA: CPT

## 2023-11-26 PROCEDURE — 36415 COLL VENOUS BLD VENIPUNCTURE: CPT

## 2023-11-26 PROCEDURE — 6370000000 HC RX 637 (ALT 250 FOR IP): Performed by: INTERNAL MEDICINE

## 2023-11-26 PROCEDURE — 2700000000 HC OXYGEN THERAPY PER DAY

## 2023-11-26 PROCEDURE — 2580000003 HC RX 258: Performed by: INTERNAL MEDICINE

## 2023-11-26 PROCEDURE — 2580000003 HC RX 258: Performed by: STUDENT IN AN ORGANIZED HEALTH CARE EDUCATION/TRAINING PROGRAM

## 2023-11-26 PROCEDURE — 1100000003 HC PRIVATE W/ TELEMETRY

## 2023-11-26 RX ADMIN — DOCUSATE SODIUM 50 MG AND SENNOSIDES 8.6 MG 1 TABLET: 8.6; 5 TABLET, FILM COATED ORAL at 09:55

## 2023-11-26 RX ADMIN — BUMETANIDE 1 MG: 1 TABLET ORAL at 21:31

## 2023-11-26 RX ADMIN — PIPERACILLIN AND TAZOBACTAM 3375 MG: 3; .375 INJECTION, POWDER, LYOPHILIZED, FOR SOLUTION INTRAVENOUS at 00:22

## 2023-11-26 RX ADMIN — VANCOMYCIN HYDROCHLORIDE 1250 MG: 10 INJECTION, POWDER, LYOPHILIZED, FOR SOLUTION INTRAVENOUS at 04:13

## 2023-11-26 RX ADMIN — BUMETANIDE 1 MG: 1 TABLET ORAL at 09:55

## 2023-11-26 RX ADMIN — ENOXAPARIN SODIUM 30 MG: 100 INJECTION SUBCUTANEOUS at 21:31

## 2023-11-26 RX ADMIN — ENOXAPARIN SODIUM 30 MG: 100 INJECTION SUBCUTANEOUS at 09:55

## 2023-11-26 RX ADMIN — PIPERACILLIN AND TAZOBACTAM 3375 MG: 3; .375 INJECTION, POWDER, LYOPHILIZED, FOR SOLUTION INTRAVENOUS at 15:35

## 2023-11-26 RX ADMIN — CASTOR OIL AND BALSAM, PERU: 788; 87 OINTMENT TOPICAL at 21:33

## 2023-11-26 RX ADMIN — PIPERACILLIN AND TAZOBACTAM 3375 MG: 3; .375 INJECTION, POWDER, LYOPHILIZED, FOR SOLUTION INTRAVENOUS at 23:45

## 2023-11-26 RX ADMIN — VANCOMYCIN HYDROCHLORIDE 1250 MG: 10 INJECTION, POWDER, LYOPHILIZED, FOR SOLUTION INTRAVENOUS at 15:35

## 2023-11-26 RX ADMIN — GABAPENTIN 300 MG: 300 CAPSULE ORAL at 21:31

## 2023-11-26 RX ADMIN — GABAPENTIN 300 MG: 300 CAPSULE ORAL at 15:35

## 2023-11-26 RX ADMIN — SODIUM CHLORIDE, PRESERVATIVE FREE 10 ML: 5 INJECTION INTRAVENOUS at 09:55

## 2023-11-26 RX ADMIN — CASTOR OIL AND BALSAM, PERU: 788; 87 OINTMENT TOPICAL at 09:56

## 2023-11-26 RX ADMIN — GABAPENTIN 300 MG: 300 CAPSULE ORAL at 09:55

## 2023-11-26 RX ADMIN — LEVOTHYROXINE SODIUM 75 MCG: 0.07 TABLET ORAL at 06:40

## 2023-11-26 RX ADMIN — PIPERACILLIN AND TAZOBACTAM 3375 MG: 3; .375 INJECTION, POWDER, LYOPHILIZED, FOR SOLUTION INTRAVENOUS at 06:40

## 2023-11-26 RX ADMIN — PANTOPRAZOLE SODIUM 40 MG: 40 TABLET, DELAYED RELEASE ORAL at 09:55

## 2023-11-26 RX ADMIN — SODIUM CHLORIDE, PRESERVATIVE FREE 10 ML: 5 INJECTION INTRAVENOUS at 23:45

## 2023-11-26 ASSESSMENT — PAIN SCALES - GENERAL
PAINLEVEL_OUTOF10: 0

## 2023-11-26 NOTE — CARE COORDINATION
Care Management Initial Assessment       RUR: 12%  Readmission? No  1st IM letter given? No  1st  letter given: No          11/26/23 1527   Service Assessment   Patient Orientation Alert and Oriented   Cognition Alert   History Provided By Patient   Primary Caregiver Other (Comment)  (mother)   [address] is: Legal Next of Kin   PCP Verified by CM Yes   Last Visit to PCP Within last year   Prior Functional Level Assistance with the following:;Bathing;Dressing; Toileting;Cooking;Housework; Shopping   Current Functional Level Assistance with the following:;Bathing;Dressing;Cooking;Housework; Shopping   Can patient return to prior living arrangement Yes   Ability to make needs known: Good   Family able to assist with home care needs: Yes   Would you like for me to discuss the discharge plan with any other family members/significant others, and if so, who? No   Financial Resources Jameel Maria None   Social/Functional History   Lives With Parent   Type of [address] [place] [address]  One level   Home Access Ramped entrance   [facility]   ADL Assistance Needs assistance   [facility] Needs assistance   Ambulation Assistance Non-ambulatory   Transfer Assistance Needs assistance   Active  No   Occupation On disability   Discharge Planning   Type of [address] Parent   Current Services Prior To Admission None   [address]   DME Ordered? No   Potential Assistance Purchasing Medications No   Type of Home Care Services None   Patient expects to be discharged to: House       Chart reviewed. Assessment completed. Verified contact information and demographics. Pt lives with her 68year old mother who is her primary caregiver. Pt is non ambulatory at baseline. She uses a wheelchair for mobility and has wheelchair ramp to enter the home.  Hx of

## 2023-11-27 LAB
ANION GAP SERPL CALC-SCNC: 5 MMOL/L (ref 5–15)
BUN SERPL-MCNC: 9 MG/DL (ref 6–20)
BUN/CREAT SERPL: 10 (ref 12–20)
CALCIUM SERPL-MCNC: 8.5 MG/DL (ref 8.5–10.1)
CHLORIDE SERPL-SCNC: 100 MMOL/L (ref 97–108)
CO2 SERPL-SCNC: 34 MMOL/L (ref 21–32)
CREAT SERPL-MCNC: 0.9 MG/DL (ref 0.55–1.02)
ERYTHROCYTE [DISTWIDTH] IN BLOOD BY AUTOMATED COUNT: 18.1 % (ref 11.5–14.5)
GLUCOSE SERPL-MCNC: 113 MG/DL (ref 65–100)
HCT VFR BLD AUTO: 43.9 % (ref 35–47)
HGB BLD-MCNC: 13.5 G/DL (ref 11.5–16)
MAGNESIUM SERPL-MCNC: 2.3 MG/DL (ref 1.6–2.4)
MCH RBC QN AUTO: 31.7 PG (ref 26–34)
MCHC RBC AUTO-ENTMCNC: 30.8 G/DL (ref 30–36.5)
MCV RBC AUTO: 103.1 FL (ref 80–99)
NRBC # BLD: 0.02 K/UL (ref 0–0.01)
NRBC BLD-RTO: 0.2 PER 100 WBC
PLATELET # BLD AUTO: 282 K/UL (ref 150–400)
PMV BLD AUTO: 10 FL (ref 8.9–12.9)
POTASSIUM SERPL-SCNC: 3.4 MMOL/L (ref 3.5–5.1)
RBC # BLD AUTO: 4.26 M/UL (ref 3.8–5.2)
SODIUM SERPL-SCNC: 139 MMOL/L (ref 136–145)
VANCOMYCIN SERPL-MCNC: 17 UG/ML
WBC # BLD AUTO: 8.4 K/UL (ref 3.6–11)

## 2023-11-27 PROCEDURE — 2500000003 HC RX 250 WO HCPCS: Performed by: STUDENT IN AN ORGANIZED HEALTH CARE EDUCATION/TRAINING PROGRAM

## 2023-11-27 PROCEDURE — 2580000003 HC RX 258: Performed by: INTERNAL MEDICINE

## 2023-11-27 PROCEDURE — 80048 BASIC METABOLIC PNL TOTAL CA: CPT

## 2023-11-27 PROCEDURE — 6360000002 HC RX W HCPCS: Performed by: INTERNAL MEDICINE

## 2023-11-27 PROCEDURE — 80202 ASSAY OF VANCOMYCIN: CPT

## 2023-11-27 PROCEDURE — 83735 ASSAY OF MAGNESIUM: CPT

## 2023-11-27 PROCEDURE — C1751 CATH, INF, PER/CENT/MIDLINE: HCPCS

## 2023-11-27 PROCEDURE — 94760 N-INVAS EAR/PLS OXIMETRY 1: CPT

## 2023-11-27 PROCEDURE — 76937 US GUIDE VASCULAR ACCESS: CPT

## 2023-11-27 PROCEDURE — 2580000003 HC RX 258: Performed by: STUDENT IN AN ORGANIZED HEALTH CARE EDUCATION/TRAINING PROGRAM

## 2023-11-27 PROCEDURE — 2700000000 HC OXYGEN THERAPY PER DAY

## 2023-11-27 PROCEDURE — 6370000000 HC RX 637 (ALT 250 FOR IP): Performed by: INTERNAL MEDICINE

## 2023-11-27 PROCEDURE — 6360000002 HC RX W HCPCS: Performed by: STUDENT IN AN ORGANIZED HEALTH CARE EDUCATION/TRAINING PROGRAM

## 2023-11-27 PROCEDURE — 85027 COMPLETE CBC AUTOMATED: CPT

## 2023-11-27 PROCEDURE — 97530 THERAPEUTIC ACTIVITIES: CPT

## 2023-11-27 PROCEDURE — 1100000003 HC PRIVATE W/ TELEMETRY

## 2023-11-27 PROCEDURE — 36415 COLL VENOUS BLD VENIPUNCTURE: CPT

## 2023-11-27 PROCEDURE — 97162 PT EVAL MOD COMPLEX 30 MIN: CPT

## 2023-11-27 RX ADMIN — PIPERACILLIN AND TAZOBACTAM 3375 MG: 3; .375 INJECTION, POWDER, LYOPHILIZED, FOR SOLUTION INTRAVENOUS at 06:55

## 2023-11-27 RX ADMIN — GABAPENTIN 300 MG: 300 CAPSULE ORAL at 21:28

## 2023-11-27 RX ADMIN — GABAPENTIN 300 MG: 300 CAPSULE ORAL at 15:20

## 2023-11-27 RX ADMIN — SODIUM CHLORIDE, PRESERVATIVE FREE 10 ML: 5 INJECTION INTRAVENOUS at 10:24

## 2023-11-27 RX ADMIN — GABAPENTIN 300 MG: 300 CAPSULE ORAL at 10:23

## 2023-11-27 RX ADMIN — SODIUM CHLORIDE, PRESERVATIVE FREE 10 ML: 5 INJECTION INTRAVENOUS at 21:26

## 2023-11-27 RX ADMIN — VANCOMYCIN HYDROCHLORIDE 1250 MG: 10 INJECTION, POWDER, LYOPHILIZED, FOR SOLUTION INTRAVENOUS at 17:05

## 2023-11-27 RX ADMIN — DOCUSATE SODIUM 50 MG AND SENNOSIDES 8.6 MG 1 TABLET: 8.6; 5 TABLET, FILM COATED ORAL at 10:28

## 2023-11-27 RX ADMIN — MICONAZOLE NITRATE: 2 POWDER TOPICAL at 21:29

## 2023-11-27 RX ADMIN — CASTOR OIL AND BALSAM, PERU: 788; 87 OINTMENT TOPICAL at 10:26

## 2023-11-27 RX ADMIN — ENOXAPARIN SODIUM 30 MG: 100 INJECTION SUBCUTANEOUS at 21:00

## 2023-11-27 RX ADMIN — CASTOR OIL AND BALSAM, PERU: 788; 87 OINTMENT TOPICAL at 21:30

## 2023-11-27 RX ADMIN — ENOXAPARIN SODIUM 30 MG: 100 INJECTION SUBCUTANEOUS at 10:23

## 2023-11-27 RX ADMIN — LEVOTHYROXINE SODIUM 75 MCG: 0.07 TABLET ORAL at 06:55

## 2023-11-27 RX ADMIN — PANTOPRAZOLE SODIUM 40 MG: 40 TABLET, DELAYED RELEASE ORAL at 10:23

## 2023-11-27 RX ADMIN — PIPERACILLIN AND TAZOBACTAM 3375 MG: 3; .375 INJECTION, POWDER, LYOPHILIZED, FOR SOLUTION INTRAVENOUS at 23:00

## 2023-11-27 RX ADMIN — VANCOMYCIN HYDROCHLORIDE 1250 MG: 10 INJECTION, POWDER, LYOPHILIZED, FOR SOLUTION INTRAVENOUS at 03:31

## 2023-11-27 RX ADMIN — BUMETANIDE 1 MG: 1 TABLET ORAL at 10:23

## 2023-11-27 RX ADMIN — BUMETANIDE 1 MG: 1 TABLET ORAL at 21:28

## 2023-11-27 RX ADMIN — PIPERACILLIN AND TAZOBACTAM 3375 MG: 3; .375 INJECTION, POWDER, LYOPHILIZED, FOR SOLUTION INTRAVENOUS at 15:20

## 2023-11-27 ASSESSMENT — PAIN SCALES - GENERAL: PAINLEVEL_OUTOF10: 0

## 2023-11-28 ENCOUNTER — APPOINTMENT (OUTPATIENT)
Facility: HOSPITAL | Age: 43
DRG: 383 | End: 2023-11-28
Payer: MEDICAID

## 2023-11-28 LAB
ANION GAP SERPL CALC-SCNC: 4 MMOL/L (ref 5–15)
BACTERIA SPEC CULT: NORMAL
BACTERIA SPEC CULT: NORMAL
BASOPHILS # BLD: 0 K/UL (ref 0–0.1)
BASOPHILS NFR BLD: 0 % (ref 0–1)
BUN SERPL-MCNC: 8 MG/DL (ref 6–20)
BUN/CREAT SERPL: 8 (ref 12–20)
CALCIUM SERPL-MCNC: 8.3 MG/DL (ref 8.5–10.1)
CHLORIDE SERPL-SCNC: 100 MMOL/L (ref 97–108)
CO2 SERPL-SCNC: 34 MMOL/L (ref 21–32)
COMMENT:: NORMAL
CREAT SERPL-MCNC: 1 MG/DL (ref 0.55–1.02)
DIFFERENTIAL METHOD BLD: ABNORMAL
EOSINOPHIL # BLD: 0.2 K/UL (ref 0–0.4)
EOSINOPHIL NFR BLD: 2 % (ref 0–7)
ERYTHROCYTE [DISTWIDTH] IN BLOOD BY AUTOMATED COUNT: 18.3 % (ref 11.5–14.5)
FLUAV AG NPH QL IA: NEGATIVE
FLUBV AG NOSE QL IA: NEGATIVE
GLUCOSE SERPL-MCNC: 131 MG/DL (ref 65–100)
HCT VFR BLD AUTO: 45.7 % (ref 35–47)
HGB BLD-MCNC: 13.9 G/DL (ref 11.5–16)
IMM GRANULOCYTES # BLD AUTO: 0 K/UL (ref 0–0.04)
IMM GRANULOCYTES NFR BLD AUTO: 0 % (ref 0–0.5)
LYMPHOCYTES # BLD: 1.5 K/UL (ref 0.8–3.5)
LYMPHOCYTES NFR BLD: 14 % (ref 12–49)
MAGNESIUM SERPL-MCNC: 2.3 MG/DL (ref 1.6–2.4)
MCH RBC QN AUTO: 31.3 PG (ref 26–34)
MCHC RBC AUTO-ENTMCNC: 30.4 G/DL (ref 30–36.5)
MCV RBC AUTO: 102.9 FL (ref 80–99)
MONOCYTES # BLD: 0.6 K/UL (ref 0–1)
MONOCYTES NFR BLD: 6 % (ref 5–13)
NEUTS SEG # BLD: 8.3 K/UL (ref 1.8–8)
NEUTS SEG NFR BLD: 78 % (ref 32–75)
NRBC # BLD: 0.03 K/UL (ref 0–0.01)
NRBC BLD-RTO: 0.3 PER 100 WBC
PLATELET # BLD AUTO: 306 K/UL (ref 150–400)
PMV BLD AUTO: 10.1 FL (ref 8.9–12.9)
POTASSIUM SERPL-SCNC: 3.3 MMOL/L (ref 3.5–5.1)
RBC # BLD AUTO: 4.44 M/UL (ref 3.8–5.2)
RBC MORPH BLD: ABNORMAL
SARS-COV-2 RDRP RESP QL NAA+PROBE: NOT DETECTED
SERVICE CMNT-IMP: NORMAL
SERVICE CMNT-IMP: NORMAL
SODIUM SERPL-SCNC: 138 MMOL/L (ref 136–145)
SOURCE: NORMAL
SPECIMEN HOLD: NORMAL
WBC # BLD AUTO: 10.6 K/UL (ref 3.6–11)

## 2023-11-28 PROCEDURE — 97530 THERAPEUTIC ACTIVITIES: CPT

## 2023-11-28 PROCEDURE — 97110 THERAPEUTIC EXERCISES: CPT

## 2023-11-28 PROCEDURE — 6370000000 HC RX 637 (ALT 250 FOR IP): Performed by: INTERNAL MEDICINE

## 2023-11-28 PROCEDURE — 1100000003 HC PRIVATE W/ TELEMETRY

## 2023-11-28 PROCEDURE — 6360000002 HC RX W HCPCS: Performed by: STUDENT IN AN ORGANIZED HEALTH CARE EDUCATION/TRAINING PROGRAM

## 2023-11-28 PROCEDURE — 87635 SARS-COV-2 COVID-19 AMP PRB: CPT

## 2023-11-28 PROCEDURE — 87804 INFLUENZA ASSAY W/OPTIC: CPT

## 2023-11-28 PROCEDURE — 6360000002 HC RX W HCPCS: Performed by: INTERNAL MEDICINE

## 2023-11-28 PROCEDURE — 6370000000 HC RX 637 (ALT 250 FOR IP)

## 2023-11-28 PROCEDURE — 71045 X-RAY EXAM CHEST 1 VIEW: CPT

## 2023-11-28 PROCEDURE — 36415 COLL VENOUS BLD VENIPUNCTURE: CPT

## 2023-11-28 PROCEDURE — 83735 ASSAY OF MAGNESIUM: CPT

## 2023-11-28 PROCEDURE — 80048 BASIC METABOLIC PNL TOTAL CA: CPT

## 2023-11-28 PROCEDURE — 85025 COMPLETE CBC W/AUTO DIFF WBC: CPT

## 2023-11-28 PROCEDURE — 2580000003 HC RX 258: Performed by: INTERNAL MEDICINE

## 2023-11-28 PROCEDURE — 97535 SELF CARE MNGMENT TRAINING: CPT

## 2023-11-28 PROCEDURE — 97165 OT EVAL LOW COMPLEX 30 MIN: CPT

## 2023-11-28 PROCEDURE — 2580000003 HC RX 258: Performed by: STUDENT IN AN ORGANIZED HEALTH CARE EDUCATION/TRAINING PROGRAM

## 2023-11-28 RX ORDER — SULFAMETHOXAZOLE AND TRIMETHOPRIM 400; 80 MG/1; MG/1
2 TABLET ORAL EVERY 12 HOURS SCHEDULED
Status: COMPLETED | OUTPATIENT
Start: 2023-11-28 | End: 2023-11-30

## 2023-11-28 RX ADMIN — MICONAZOLE NITRATE: 2 POWDER TOPICAL at 18:52

## 2023-11-28 RX ADMIN — CASTOR OIL AND BALSAM, PERU: 788; 87 OINTMENT TOPICAL at 22:43

## 2023-11-28 RX ADMIN — VANCOMYCIN HYDROCHLORIDE 1250 MG: 10 INJECTION, POWDER, LYOPHILIZED, FOR SOLUTION INTRAVENOUS at 04:00

## 2023-11-28 RX ADMIN — GABAPENTIN 300 MG: 300 CAPSULE ORAL at 10:22

## 2023-11-28 RX ADMIN — CASTOR OIL AND BALSAM, PERU: 788; 87 OINTMENT TOPICAL at 10:21

## 2023-11-28 RX ADMIN — BUMETANIDE 1 MG: 1 TABLET ORAL at 22:42

## 2023-11-28 RX ADMIN — BUMETANIDE 1 MG: 1 TABLET ORAL at 10:22

## 2023-11-28 RX ADMIN — METOPROLOL SUCCINATE 25 MG: 25 TABLET, EXTENDED RELEASE ORAL at 10:22

## 2023-11-28 RX ADMIN — GABAPENTIN 300 MG: 300 CAPSULE ORAL at 21:00

## 2023-11-28 RX ADMIN — SULFAMETHOXAZOLE AND TRIMETHOPRIM 2 TABLET: 400; 80 TABLET ORAL at 21:00

## 2023-11-28 RX ADMIN — SODIUM CHLORIDE, PRESERVATIVE FREE 10 ML: 5 INJECTION INTRAVENOUS at 22:42

## 2023-11-28 RX ADMIN — GABAPENTIN 300 MG: 300 CAPSULE ORAL at 15:23

## 2023-11-28 RX ADMIN — MICONAZOLE NITRATE: 2 POWDER TOPICAL at 22:44

## 2023-11-28 RX ADMIN — DOCUSATE SODIUM 50 MG AND SENNOSIDES 8.6 MG 1 TABLET: 8.6; 5 TABLET, FILM COATED ORAL at 10:22

## 2023-11-28 RX ADMIN — ENOXAPARIN SODIUM 30 MG: 100 INJECTION SUBCUTANEOUS at 21:00

## 2023-11-28 RX ADMIN — LEVOTHYROXINE SODIUM 75 MCG: 0.07 TABLET ORAL at 07:59

## 2023-11-28 RX ADMIN — PANTOPRAZOLE SODIUM 40 MG: 40 TABLET, DELAYED RELEASE ORAL at 10:22

## 2023-11-28 RX ADMIN — ENOXAPARIN SODIUM 30 MG: 100 INJECTION SUBCUTANEOUS at 10:22

## 2023-11-28 RX ADMIN — PIPERACILLIN AND TAZOBACTAM 3375 MG: 3; .375 INJECTION, POWDER, LYOPHILIZED, FOR SOLUTION INTRAVENOUS at 15:19

## 2023-11-28 RX ADMIN — SODIUM CHLORIDE, PRESERVATIVE FREE 10 ML: 5 INJECTION INTRAVENOUS at 10:21

## 2023-11-28 RX ADMIN — PIPERACILLIN AND TAZOBACTAM 3375 MG: 3; .375 INJECTION, POWDER, LYOPHILIZED, FOR SOLUTION INTRAVENOUS at 08:01

## 2023-11-28 NOTE — CARE COORDINATION
Transition of Care Plan:    RUR: 14%  Prior Level of Functioning: Needs assistance with ADLs  Disposition: SNF Placement- [facility] ([facility] choice) and [facility] (2nd choice)  Follow up appointments: Follow up with PCP and/or Specialist   DME needed: will use at facility   Transportation at discharge: BLS transport   IM/IMM Medicare/ letter given: 2nd IM Medicare Letter given   Is patient a  and connected with VA? N/A   If yes, was [employer] transfer form completed and VA notified? N/A  Caregiver Contact: Roger Stanton (parent) [phone]  Discharge Caregiver contacted prior to discharge? Family to be contacted   Care Conference needed? Not at this time  Barriers to discharge: Placement (acceptance and auth)      CM: Jens Herrera is currently working with pt in the Medical Telemetry Unit. CM informed that pt is being recommended for snf placement at the time of d/c. Pt is known to been at snf in the past: [facility], [facility] in the past.      CM completed room visit with pt, to review recommendations. Pt is agreeable to recommendations of placement at the time of d/c. Pt reported that she would like for a referral to be sent to both [facility] (1st) and [facility] (2nd). CM placed referral, via epic. Pt pending acceptance and insurance auth. CM will continue to follow.     ELIAS Brown CM  [phone]

## 2023-11-29 ENCOUNTER — APPOINTMENT (OUTPATIENT)
Facility: HOSPITAL | Age: 43
DRG: 383 | End: 2023-11-29
Payer: MEDICAID

## 2023-11-29 LAB
ANION GAP SERPL CALC-SCNC: 4 MMOL/L (ref 5–15)
ARTERIAL PATENCY WRIST A: YES
BASE EXCESS BLDA CALC-SCNC: 5.6 MMOL/L
BASOPHILS # BLD: 0.1 K/UL (ref 0–0.1)
BASOPHILS NFR BLD: 1 % (ref 0–1)
BDY SITE: ABNORMAL
BUN SERPL-MCNC: 8 MG/DL (ref 6–20)
BUN/CREAT SERPL: 8 (ref 12–20)
CALCIUM SERPL-MCNC: 8.3 MG/DL (ref 8.5–10.1)
CHLORIDE SERPL-SCNC: 101 MMOL/L (ref 97–108)
CO2 SERPL-SCNC: 32 MMOL/L (ref 21–32)
CREAT SERPL-MCNC: 1 MG/DL (ref 0.55–1.02)
D DIMER PPP FEU-MCNC: 2.89 MG/L FEU (ref 0–0.65)
DIFFERENTIAL METHOD BLD: ABNORMAL
EOSINOPHIL # BLD: 0.8 K/UL (ref 0–0.4)
EOSINOPHIL NFR BLD: 7 % (ref 0–7)
ERYTHROCYTE [DISTWIDTH] IN BLOOD BY AUTOMATED COUNT: 18.3 % (ref 11.5–14.5)
GAS FLOW.O2 O2 DELIVERY SYS: 3.5 L/MIN
GLUCOSE SERPL-MCNC: 125 MG/DL (ref 65–100)
HCO3 BLDA-SCNC: 32 MMOL/L (ref 22–26)
HCT VFR BLD AUTO: 41 % (ref 35–47)
HGB BLD-MCNC: 12.8 G/DL (ref 11.5–16)
IMM GRANULOCYTES # BLD AUTO: 0 K/UL (ref 0–0.04)
IMM GRANULOCYTES NFR BLD AUTO: 0 % (ref 0–0.5)
LYMPHOCYTES # BLD: 1.6 K/UL (ref 0.8–3.5)
LYMPHOCYTES NFR BLD: 14 % (ref 12–49)
MCH RBC QN AUTO: 30.9 PG (ref 26–34)
MCHC RBC AUTO-ENTMCNC: 31.2 G/DL (ref 30–36.5)
MCV RBC AUTO: 99 FL (ref 80–99)
METAMYELOCYTES NFR BLD MANUAL: 2 %
MONOCYTES # BLD: 0.8 K/UL (ref 0–1)
MONOCYTES NFR BLD: 7 % (ref 5–13)
NEUTS BAND NFR BLD MANUAL: 2 %
NEUTS SEG # BLD: 7.9 K/UL (ref 1.8–8)
NEUTS SEG NFR BLD: 67 % (ref 32–75)
NRBC # BLD: 0.03 K/UL (ref 0–0.01)
NRBC BLD-RTO: 0.3 PER 100 WBC
PCO2 BLDA: 56 MMHG (ref 35–45)
PH BLDA: 7.38 (ref 7.35–7.45)
PLATELET # BLD AUTO: 320 K/UL (ref 150–400)
PMV BLD AUTO: 10 FL (ref 8.9–12.9)
PO2 BLDA: 73 MMHG (ref 80–100)
POTASSIUM SERPL-SCNC: 3.1 MMOL/L (ref 3.5–5.1)
PROCALCITONIN SERPL-MCNC: 0.1 NG/ML
RBC # BLD AUTO: 4.14 M/UL (ref 3.8–5.2)
RBC MORPH BLD: ABNORMAL
RBC MORPH BLD: ABNORMAL
SAO2 % BLD: 94 % (ref 92–97)
SAO2% DEVICE SAO2% SENSOR NAME: ABNORMAL
SODIUM SERPL-SCNC: 137 MMOL/L (ref 136–145)
SPECIMEN SITE: ABNORMAL
WBC # BLD AUTO: 11.4 K/UL (ref 3.6–11)

## 2023-11-29 PROCEDURE — 2580000003 HC RX 258: Performed by: INTERNAL MEDICINE

## 2023-11-29 PROCEDURE — 36600 WITHDRAWAL OF ARTERIAL BLOOD: CPT

## 2023-11-29 PROCEDURE — 80048 BASIC METABOLIC PNL TOTAL CA: CPT

## 2023-11-29 PROCEDURE — 6370000000 HC RX 637 (ALT 250 FOR IP)

## 2023-11-29 PROCEDURE — 84145 PROCALCITONIN (PCT): CPT

## 2023-11-29 PROCEDURE — 85379 FIBRIN DEGRADATION QUANT: CPT

## 2023-11-29 PROCEDURE — 6360000004 HC RX CONTRAST MEDICATION: Performed by: STUDENT IN AN ORGANIZED HEALTH CARE EDUCATION/TRAINING PROGRAM

## 2023-11-29 PROCEDURE — 93970 EXTREMITY STUDY: CPT

## 2023-11-29 PROCEDURE — 82803 BLOOD GASES ANY COMBINATION: CPT

## 2023-11-29 PROCEDURE — 6360000002 HC RX W HCPCS: Performed by: INTERNAL MEDICINE

## 2023-11-29 PROCEDURE — 71275 CT ANGIOGRAPHY CHEST: CPT

## 2023-11-29 PROCEDURE — 2500000003 HC RX 250 WO HCPCS: Performed by: STUDENT IN AN ORGANIZED HEALTH CARE EDUCATION/TRAINING PROGRAM

## 2023-11-29 PROCEDURE — 1100000003 HC PRIVATE W/ TELEMETRY

## 2023-11-29 PROCEDURE — 36415 COLL VENOUS BLD VENIPUNCTURE: CPT

## 2023-11-29 PROCEDURE — 6370000000 HC RX 637 (ALT 250 FOR IP): Performed by: INTERNAL MEDICINE

## 2023-11-29 PROCEDURE — 85025 COMPLETE CBC W/AUTO DIFF WBC: CPT

## 2023-11-29 RX ORDER — IPRATROPIUM BROMIDE AND ALBUTEROL SULFATE 2.5; .5 MG/3ML; MG/3ML
1 SOLUTION RESPIRATORY (INHALATION)
Status: DISCONTINUED | OUTPATIENT
Start: 2023-11-29 | End: 2023-11-30

## 2023-11-29 RX ADMIN — DOCUSATE SODIUM 50 MG AND SENNOSIDES 8.6 MG 1 TABLET: 8.6; 5 TABLET, FILM COATED ORAL at 08:36

## 2023-11-29 RX ADMIN — MICONAZOLE NITRATE: 2 POWDER TOPICAL at 09:36

## 2023-11-29 RX ADMIN — SULFAMETHOXAZOLE AND TRIMETHOPRIM 2 TABLET: 400; 80 TABLET ORAL at 22:22

## 2023-11-29 RX ADMIN — SODIUM CHLORIDE, PRESERVATIVE FREE 10 ML: 5 INJECTION INTRAVENOUS at 22:31

## 2023-11-29 RX ADMIN — GABAPENTIN 300 MG: 300 CAPSULE ORAL at 13:54

## 2023-11-29 RX ADMIN — PANTOPRAZOLE SODIUM 40 MG: 40 TABLET, DELAYED RELEASE ORAL at 08:37

## 2023-11-29 RX ADMIN — SULFAMETHOXAZOLE AND TRIMETHOPRIM 2 TABLET: 400; 80 TABLET ORAL at 09:36

## 2023-11-29 RX ADMIN — GABAPENTIN 300 MG: 300 CAPSULE ORAL at 22:22

## 2023-11-29 RX ADMIN — CASTOR OIL AND BALSAM, PERU: 788; 87 OINTMENT TOPICAL at 22:21

## 2023-11-29 RX ADMIN — ENOXAPARIN SODIUM 30 MG: 100 INJECTION SUBCUTANEOUS at 08:36

## 2023-11-29 RX ADMIN — IOPAMIDOL 100 ML: 755 INJECTION, SOLUTION INTRAVENOUS at 10:54

## 2023-11-29 RX ADMIN — LEVOTHYROXINE SODIUM 75 MCG: 0.07 TABLET ORAL at 05:59

## 2023-11-29 RX ADMIN — BUMETANIDE 1 MG: 1 TABLET ORAL at 08:37

## 2023-11-29 RX ADMIN — METOPROLOL SUCCINATE 25 MG: 25 TABLET, EXTENDED RELEASE ORAL at 08:36

## 2023-11-29 RX ADMIN — ENOXAPARIN SODIUM 30 MG: 100 INJECTION SUBCUTANEOUS at 22:22

## 2023-11-29 RX ADMIN — GABAPENTIN 300 MG: 300 CAPSULE ORAL at 08:36

## 2023-11-29 RX ADMIN — MICONAZOLE NITRATE: 2 POWDER TOPICAL at 22:28

## 2023-11-29 RX ADMIN — SODIUM CHLORIDE, PRESERVATIVE FREE 10 ML: 5 INJECTION INTRAVENOUS at 08:37

## 2023-11-29 RX ADMIN — CASTOR OIL AND BALSAM, PERU: 788; 87 OINTMENT TOPICAL at 09:36

## 2023-11-29 ASSESSMENT — PAIN SCALES - GENERAL: PAINLEVEL_OUTOF10: 0

## 2023-11-30 ENCOUNTER — APPOINTMENT (OUTPATIENT)
Facility: HOSPITAL | Age: 43
DRG: 383 | End: 2023-11-30
Payer: MEDICAID

## 2023-11-30 LAB
ANION GAP SERPL CALC-SCNC: 2 MMOL/L (ref 5–15)
BASOPHILS # BLD: 0.1 K/UL (ref 0–0.1)
BASOPHILS NFR BLD: 1 % (ref 0–1)
BUN SERPL-MCNC: 9 MG/DL (ref 6–20)
BUN/CREAT SERPL: 9 (ref 12–20)
CALCIUM SERPL-MCNC: 8.3 MG/DL (ref 8.5–10.1)
CHLORIDE SERPL-SCNC: 100 MMOL/L (ref 97–108)
CO2 SERPL-SCNC: 34 MMOL/L (ref 21–32)
CREAT SERPL-MCNC: 1.04 MG/DL (ref 0.55–1.02)
DIFFERENTIAL METHOD BLD: ABNORMAL
ECHO BSA: 2.46 M2
ECHO LA DIAMETER INDEX: 1.97 CM/M2
ECHO LA DIAMETER: 4.5 CM
ECHO LV FRACTIONAL SHORTENING: 33 % (ref 28–44)
ECHO LV INTERNAL DIMENSION DIASTOLE INDEX: 2.1 CM/M2
ECHO LV INTERNAL DIMENSION DIASTOLIC: 4.8 CM (ref 3.9–5.3)
ECHO LV INTERNAL DIMENSION SYSTOLIC INDEX: 1.4 CM/M2
ECHO LV INTERNAL DIMENSION SYSTOLIC: 3.2 CM
ECHO LV IVSD: 1.2 CM (ref 0.6–0.9)
ECHO LV MASS 2D: 219.1 G (ref 67–162)
ECHO LV MASS INDEX 2D: 95.7 G/M2 (ref 43–95)
ECHO LV POSTERIOR WALL DIASTOLIC: 1.2 CM (ref 0.6–0.9)
ECHO LV RELATIVE WALL THICKNESS RATIO: 0.5
ECHO PV MAX VELOCITY: 1.2 M/S
ECHO PV PEAK GRADIENT: 6 MMHG
EOSINOPHIL # BLD: 0.4 K/UL (ref 0–0.4)
EOSINOPHIL NFR BLD: 3 % (ref 0–7)
ERYTHROCYTE [DISTWIDTH] IN BLOOD BY AUTOMATED COUNT: 18.4 % (ref 11.5–14.5)
GLUCOSE BLD STRIP.AUTO-MCNC: 80 MG/DL (ref 65–117)
GLUCOSE SERPL-MCNC: 92 MG/DL (ref 65–100)
HCT VFR BLD AUTO: 42.7 % (ref 35–47)
HGB BLD-MCNC: 12.9 G/DL (ref 11.5–16)
IMM GRANULOCYTES # BLD AUTO: 0.3 K/UL (ref 0–0.04)
IMM GRANULOCYTES NFR BLD AUTO: 2 % (ref 0–0.5)
LYMPHOCYTES # BLD: 1.3 K/UL (ref 0.8–3.5)
LYMPHOCYTES NFR BLD: 10 % (ref 12–49)
MCH RBC QN AUTO: 30.4 PG (ref 26–34)
MCHC RBC AUTO-ENTMCNC: 30.2 G/DL (ref 30–36.5)
MCV RBC AUTO: 100.5 FL (ref 80–99)
MONOCYTES # BLD: 0.5 K/UL (ref 0–1)
MONOCYTES NFR BLD: 4 % (ref 5–13)
NEUTS SEG # BLD: 10.7 K/UL (ref 1.8–8)
NEUTS SEG NFR BLD: 80 % (ref 32–75)
NRBC # BLD: 0.02 K/UL (ref 0–0.01)
NRBC BLD-RTO: 0.2 PER 100 WBC
PLATELET # BLD AUTO: 297 K/UL (ref 150–400)
PMV BLD AUTO: 9.8 FL (ref 8.9–12.9)
POTASSIUM SERPL-SCNC: 3.9 MMOL/L (ref 3.5–5.1)
RBC # BLD AUTO: 4.25 M/UL (ref 3.8–5.2)
RBC MORPH BLD: ABNORMAL
SERVICE CMNT-IMP: NORMAL
SODIUM SERPL-SCNC: 136 MMOL/L (ref 136–145)
WBC # BLD AUTO: 13.3 K/UL (ref 3.6–11)

## 2023-11-30 PROCEDURE — 6370000000 HC RX 637 (ALT 250 FOR IP): Performed by: INTERNAL MEDICINE

## 2023-11-30 PROCEDURE — 1100000003 HC PRIVATE W/ TELEMETRY

## 2023-11-30 PROCEDURE — 2700000000 HC OXYGEN THERAPY PER DAY

## 2023-11-30 PROCEDURE — 6360000002 HC RX W HCPCS: Performed by: INTERNAL MEDICINE

## 2023-11-30 PROCEDURE — 94760 N-INVAS EAR/PLS OXIMETRY 1: CPT

## 2023-11-30 PROCEDURE — 70450 CT HEAD/BRAIN W/O DYE: CPT

## 2023-11-30 PROCEDURE — 36415 COLL VENOUS BLD VENIPUNCTURE: CPT

## 2023-11-30 PROCEDURE — 92610 EVALUATE SWALLOWING FUNCTION: CPT

## 2023-11-30 PROCEDURE — 6370000000 HC RX 637 (ALT 250 FOR IP)

## 2023-11-30 PROCEDURE — 99222 1ST HOSP IP/OBS MODERATE 55: CPT | Performed by: INTERNAL MEDICINE

## 2023-11-30 PROCEDURE — 93308 TTE F-UP OR LMTD: CPT

## 2023-11-30 PROCEDURE — 2580000003 HC RX 258: Performed by: INTERNAL MEDICINE

## 2023-11-30 PROCEDURE — 80048 BASIC METABOLIC PNL TOTAL CA: CPT

## 2023-11-30 PROCEDURE — 92522 EVALUATE SPEECH PRODUCTION: CPT

## 2023-11-30 PROCEDURE — 94640 AIRWAY INHALATION TREATMENT: CPT

## 2023-11-30 PROCEDURE — 82962 GLUCOSE BLOOD TEST: CPT

## 2023-11-30 PROCEDURE — 85025 COMPLETE CBC W/AUTO DIFF WBC: CPT

## 2023-11-30 RX ORDER — IPRATROPIUM BROMIDE AND ALBUTEROL SULFATE 2.5; .5 MG/3ML; MG/3ML
1 SOLUTION RESPIRATORY (INHALATION)
Status: DISCONTINUED | OUTPATIENT
Start: 2023-11-30 | End: 2023-12-02

## 2023-11-30 RX ORDER — SULFAMETHOXAZOLE AND TRIMETHOPRIM 400; 80 MG/1; MG/1
2 TABLET ORAL EVERY 12 HOURS SCHEDULED
Status: COMPLETED | OUTPATIENT
Start: 2023-11-30 | End: 2023-12-02

## 2023-11-30 RX ADMIN — BUMETANIDE 1 MG: 1 TABLET ORAL at 09:14

## 2023-11-30 RX ADMIN — NYSTATIN 500000 UNITS: 100000 SUSPENSION ORAL at 17:55

## 2023-11-30 RX ADMIN — IPRATROPIUM BROMIDE AND ALBUTEROL SULFATE 1 DOSE: .5; 3 SOLUTION RESPIRATORY (INHALATION) at 19:32

## 2023-11-30 RX ADMIN — IPRATROPIUM BROMIDE AND ALBUTEROL SULFATE 1 DOSE: .5; 3 SOLUTION RESPIRATORY (INHALATION) at 09:02

## 2023-11-30 RX ADMIN — NYSTATIN 500000 UNITS: 100000 SUSPENSION ORAL at 21:00

## 2023-11-30 RX ADMIN — CASTOR OIL AND BALSAM, PERU: 788; 87 OINTMENT TOPICAL at 21:00

## 2023-11-30 RX ADMIN — GABAPENTIN 300 MG: 300 CAPSULE ORAL at 15:34

## 2023-11-30 RX ADMIN — SODIUM CHLORIDE, PRESERVATIVE FREE 10 ML: 5 INJECTION INTRAVENOUS at 21:00

## 2023-11-30 RX ADMIN — NYSTATIN 500000 UNITS: 100000 SUSPENSION ORAL at 12:00

## 2023-11-30 RX ADMIN — GABAPENTIN 300 MG: 300 CAPSULE ORAL at 21:00

## 2023-11-30 RX ADMIN — ENOXAPARIN SODIUM 30 MG: 100 INJECTION SUBCUTANEOUS at 21:00

## 2023-11-30 RX ADMIN — SULFAMETHOXAZOLE AND TRIMETHOPRIM 2 TABLET: 400; 80 TABLET ORAL at 09:14

## 2023-11-30 RX ADMIN — SODIUM CHLORIDE, PRESERVATIVE FREE 10 ML: 5 INJECTION INTRAVENOUS at 09:15

## 2023-11-30 RX ADMIN — MICONAZOLE NITRATE: 2 POWDER TOPICAL at 09:18

## 2023-11-30 RX ADMIN — LEVOTHYROXINE SODIUM 75 MCG: 0.07 TABLET ORAL at 06:08

## 2023-11-30 RX ADMIN — MICONAZOLE NITRATE: 2 POWDER TOPICAL at 21:00

## 2023-11-30 RX ADMIN — GABAPENTIN 300 MG: 300 CAPSULE ORAL at 09:14

## 2023-11-30 RX ADMIN — SULFAMETHOXAZOLE AND TRIMETHOPRIM 2 TABLET: 400; 80 TABLET ORAL at 21:00

## 2023-11-30 RX ADMIN — PANTOPRAZOLE SODIUM 40 MG: 40 TABLET, DELAYED RELEASE ORAL at 09:14

## 2023-11-30 RX ADMIN — IPRATROPIUM BROMIDE AND ALBUTEROL SULFATE 1 DOSE: .5; 3 SOLUTION RESPIRATORY (INHALATION) at 12:06

## 2023-11-30 RX ADMIN — CASTOR OIL AND BALSAM, PERU: 788; 87 OINTMENT TOPICAL at 15:33

## 2023-11-30 RX ADMIN — ENOXAPARIN SODIUM 30 MG: 100 INJECTION SUBCUTANEOUS at 09:13

## 2023-11-30 RX ADMIN — DOCUSATE SODIUM 50 MG AND SENNOSIDES 8.6 MG 1 TABLET: 8.6; 5 TABLET, FILM COATED ORAL at 09:13

## 2023-11-30 RX ADMIN — BUMETANIDE 1 MG: 1 TABLET ORAL at 21:00

## 2023-11-30 ASSESSMENT — PAIN SCALES - GENERAL
PAINLEVEL_OUTOF10: 0

## 2023-11-30 NOTE — CONSULTS
Date of Consultation:  November 30, 2023    Referring Physician: Margy Braxton MD     Reason for Consultation:  thick speech    Chief Complaint   Patient presents with    Leg Swelling     Patient arrives from home for bilateral leg swelling with the left leg appearing worse. Patient reports new onset of redness to the left lower extremity. History of Present Illness:   Jacobo Boateng is a 39 y.o. female with history of hypothyroidism and arthritis presenting with cellulitis of the leg. Neurology consulted for \"thick/heavy speech\". Rapid response called for change in speech. Sounded thick to staff. Based on notes, per charge nurse, patient's speech sounds like this at baseline. Patient denies any new vision changes, headache, slurring of her words, language dysfunction, confusion, paresthesias, weakness that is focal, dizziness or vertigo. Denies any dysphagia. Past Medical History:   Diagnosis Date    Arthritis     Hypothyroidism         Past Surgical History:   Procedure Laterality Date    ANKLE FRACTURE SURGERY Left     ORIF        No family history on file. Social History     Tobacco Use    Smoking status: Every Day     Packs/day: .5     Types: Cigarettes    Smokeless tobacco: Never   Substance Use Topics    Alcohol use: Yes        Allergies   Allergen Reactions    Cranberry Anaphylaxis     Source of allergy unknown, pt says it was when she was a baby and has not had cranberries since. Egg Solids, Whole Diarrhea    Fish-Derived Products Anaphylaxis    Lactose Intolerance (Gi) Diarrhea        Prior to Admission medications    Medication Sig Start Date End Date Taking? Authorizing Provider   bumetanide (BUMEX) 1 MG tablet Take 1 tablet by mouth 2 times daily 8/21/23   Nat Novoa MD   levothyroxine (SYNTHROID) 75 MCG tablet Take 1 tablet by mouth Daily    ProviderRachell MD   gabapentin (NEURONTIN) 300 MG capsule Take 1 capsule by mouth 3 times daily.  Max Daily Amount: 900 mg

## 2023-11-30 NOTE — CARE COORDINATION
Transition of Care Plan:     RUR: 14%  Prior Level of Functioning: Needs assistance with ADLs  Disposition: LTC placement vs home with [address]   Follow up appointments: Follow up with PCP and/or Specialist   DME needed: owns DME required for d/c  Transportation at discharge: BLS transport   IM/IMM Medicare/ letter given: 2nd IM Medicare Letter given   Is patient a [place] and connected with VA? N/A              If yes, was [place] transfer form completed and VA notified? N/A  Caregiver Contact: Yanet Monsalve (parent) [phone]  Discharge Caregiver contacted prior to discharge? Family to be contacted   Care Conference needed? Not at this time  Barriers to discharge: medical clearance    Chart reviewed. Rosan Sicard declined referral- no SNF benefit w/ Medicaid plan. Ronnie Charles can offer LTC placement at this time. Update pt via phone today. Pt currently speaking with SLP at bedside. She is currently considering her option of LTC placement vs home- but does feel that she needs rehab. If LTC, medicaid does not cover rehab services at facility. Could consider IPR placement if pt is candidate per therapy and MD. If not, pt will need to return home with her mother. CM can secure [facility] services and offer community resources. Pt requested CM to follow up at a later time, stating she had a lot of things on her mind at this time. Will continue to follow.     ELIAS Garcia   Care Manager, [facility]

## 2023-12-01 LAB
ANION GAP SERPL CALC-SCNC: 3 MMOL/L (ref 5–15)
BASOPHILS # BLD: 0 K/UL (ref 0–0.1)
BASOPHILS NFR BLD: 0 % (ref 0–1)
BUN SERPL-MCNC: 10 MG/DL (ref 6–20)
BUN/CREAT SERPL: 9 (ref 12–20)
CALCIUM SERPL-MCNC: 8.3 MG/DL (ref 8.5–10.1)
CHLORIDE SERPL-SCNC: 100 MMOL/L (ref 97–108)
CO2 SERPL-SCNC: 36 MMOL/L (ref 21–32)
CREAT SERPL-MCNC: 1.06 MG/DL (ref 0.55–1.02)
DIFFERENTIAL METHOD BLD: ABNORMAL
EOSINOPHIL # BLD: 0.5 K/UL (ref 0–0.4)
EOSINOPHIL NFR BLD: 4 % (ref 0–7)
ERYTHROCYTE [DISTWIDTH] IN BLOOD BY AUTOMATED COUNT: 18.4 % (ref 11.5–14.5)
GLUCOSE BLD STRIP.AUTO-MCNC: 103 MG/DL (ref 65–117)
GLUCOSE SERPL-MCNC: 93 MG/DL (ref 65–100)
HCT VFR BLD AUTO: 40.2 % (ref 35–47)
HGB BLD-MCNC: 12.2 G/DL (ref 11.5–16)
IMM GRANULOCYTES # BLD AUTO: 0.1 K/UL (ref 0–0.04)
IMM GRANULOCYTES NFR BLD AUTO: 1 % (ref 0–0.5)
LYMPHOCYTES # BLD: 1.4 K/UL (ref 0.8–3.5)
LYMPHOCYTES NFR BLD: 13 % (ref 12–49)
MCH RBC QN AUTO: 31 PG (ref 26–34)
MCHC RBC AUTO-ENTMCNC: 30.3 G/DL (ref 30–36.5)
MCV RBC AUTO: 102 FL (ref 80–99)
MONOCYTES # BLD: 0.4 K/UL (ref 0–1)
MONOCYTES NFR BLD: 4 % (ref 5–13)
NEUTS SEG # BLD: 8.6 K/UL (ref 1.8–8)
NEUTS SEG NFR BLD: 78 % (ref 32–75)
NRBC # BLD: 0 K/UL (ref 0–0.01)
NRBC BLD-RTO: 0 PER 100 WBC
PLATELET # BLD AUTO: 274 K/UL (ref 150–400)
PMV BLD AUTO: 10.1 FL (ref 8.9–12.9)
POTASSIUM SERPL-SCNC: 3.4 MMOL/L (ref 3.5–5.1)
RBC # BLD AUTO: 3.94 M/UL (ref 3.8–5.2)
SERVICE CMNT-IMP: NORMAL
SODIUM SERPL-SCNC: 139 MMOL/L (ref 136–145)
WBC # BLD AUTO: 11 K/UL (ref 3.6–11)

## 2023-12-01 PROCEDURE — 85025 COMPLETE CBC W/AUTO DIFF WBC: CPT

## 2023-12-01 PROCEDURE — 94760 N-INVAS EAR/PLS OXIMETRY 1: CPT

## 2023-12-01 PROCEDURE — 2580000003 HC RX 258: Performed by: INTERNAL MEDICINE

## 2023-12-01 PROCEDURE — 6360000002 HC RX W HCPCS: Performed by: INTERNAL MEDICINE

## 2023-12-01 PROCEDURE — 6370000000 HC RX 637 (ALT 250 FOR IP): Performed by: INTERNAL MEDICINE

## 2023-12-01 PROCEDURE — 82962 GLUCOSE BLOOD TEST: CPT

## 2023-12-01 PROCEDURE — 94640 AIRWAY INHALATION TREATMENT: CPT

## 2023-12-01 PROCEDURE — 80048 BASIC METABOLIC PNL TOTAL CA: CPT

## 2023-12-01 PROCEDURE — 6370000000 HC RX 637 (ALT 250 FOR IP)

## 2023-12-01 PROCEDURE — 36415 COLL VENOUS BLD VENIPUNCTURE: CPT

## 2023-12-01 PROCEDURE — 2700000000 HC OXYGEN THERAPY PER DAY

## 2023-12-01 PROCEDURE — 1100000003 HC PRIVATE W/ TELEMETRY

## 2023-12-01 RX ADMIN — CASTOR OIL AND BALSAM, PERU: 788; 87 OINTMENT TOPICAL at 09:13

## 2023-12-01 RX ADMIN — DOCUSATE SODIUM 50 MG AND SENNOSIDES 8.6 MG 1 TABLET: 8.6; 5 TABLET, FILM COATED ORAL at 09:12

## 2023-12-01 RX ADMIN — LEVOTHYROXINE SODIUM 75 MCG: 0.07 TABLET ORAL at 05:24

## 2023-12-01 RX ADMIN — SULFAMETHOXAZOLE AND TRIMETHOPRIM 2 TABLET: 400; 80 TABLET ORAL at 22:06

## 2023-12-01 RX ADMIN — SODIUM CHLORIDE, PRESERVATIVE FREE 10 ML: 5 INJECTION INTRAVENOUS at 09:15

## 2023-12-01 RX ADMIN — SODIUM CHLORIDE, PRESERVATIVE FREE 10 ML: 5 INJECTION INTRAVENOUS at 22:08

## 2023-12-01 RX ADMIN — IPRATROPIUM BROMIDE AND ALBUTEROL SULFATE 1 DOSE: .5; 3 SOLUTION RESPIRATORY (INHALATION) at 20:01

## 2023-12-01 RX ADMIN — MICONAZOLE NITRATE: 2 POWDER TOPICAL at 22:08

## 2023-12-01 RX ADMIN — GABAPENTIN 300 MG: 300 CAPSULE ORAL at 13:53

## 2023-12-01 RX ADMIN — POTASSIUM BICARBONATE 40 MEQ: 782 TABLET, EFFERVESCENT ORAL at 13:53

## 2023-12-01 RX ADMIN — ZINC OXIDE: 400 PASTE TOPICAL at 09:14

## 2023-12-01 RX ADMIN — POTASSIUM BICARBONATE 40 MEQ: 782 TABLET, EFFERVESCENT ORAL at 09:11

## 2023-12-01 RX ADMIN — NYSTATIN 500000 UNITS: 100000 SUSPENSION ORAL at 09:11

## 2023-12-01 RX ADMIN — GABAPENTIN 300 MG: 300 CAPSULE ORAL at 22:06

## 2023-12-01 RX ADMIN — NYSTATIN 500000 UNITS: 100000 SUSPENSION ORAL at 13:53

## 2023-12-01 RX ADMIN — IPRATROPIUM BROMIDE AND ALBUTEROL SULFATE 1 DOSE: .5; 3 SOLUTION RESPIRATORY (INHALATION) at 15:13

## 2023-12-01 RX ADMIN — PANTOPRAZOLE SODIUM 40 MG: 40 TABLET, DELAYED RELEASE ORAL at 09:12

## 2023-12-01 RX ADMIN — ENOXAPARIN SODIUM 30 MG: 100 INJECTION SUBCUTANEOUS at 09:12

## 2023-12-01 RX ADMIN — NYSTATIN 500000 UNITS: 100000 SUSPENSION ORAL at 17:07

## 2023-12-01 RX ADMIN — BUMETANIDE 1 MG: 1 TABLET ORAL at 22:06

## 2023-12-01 RX ADMIN — MICONAZOLE NITRATE: 2 POWDER TOPICAL at 09:13

## 2023-12-01 RX ADMIN — NYSTATIN 500000 UNITS: 100000 SUSPENSION ORAL at 22:07

## 2023-12-01 RX ADMIN — METOPROLOL SUCCINATE 25 MG: 25 TABLET, EXTENDED RELEASE ORAL at 09:12

## 2023-12-01 RX ADMIN — ENOXAPARIN SODIUM 30 MG: 100 INJECTION SUBCUTANEOUS at 22:07

## 2023-12-01 RX ADMIN — SULFAMETHOXAZOLE AND TRIMETHOPRIM 2 TABLET: 400; 80 TABLET ORAL at 09:12

## 2023-12-01 RX ADMIN — GABAPENTIN 300 MG: 300 CAPSULE ORAL at 09:12

## 2023-12-01 RX ADMIN — BUMETANIDE 1 MG: 1 TABLET ORAL at 09:12

## 2023-12-01 RX ADMIN — CASTOR OIL AND BALSAM, PERU: 788; 87 OINTMENT TOPICAL at 22:07

## 2023-12-01 ASSESSMENT — PAIN SCALES - GENERAL
PAINLEVEL_OUTOF10: 0
PAINLEVEL_OUTOF10: 0

## 2023-12-01 NOTE — CARE COORDINATION
Transition of Care Plan:     RUR: 14%  Prior Level of Functioning: Needs assistance with ADLs  Disposition: LTC placement vs home with [address]   Follow up appointments: Follow up with PCP and/or Specialist   DME needed: owns DME required for d/c  Transportation at discharge: BLS transport   IM/IMM Medicare/ letter given: 2nd IM Medicare Letter given   Is patient a [place] and connected with VA? N/A              If yes, was [employer] transfer form completed and VA notified? N/A  Caregiver Contact: Nisha  (parent) [phone]  Discharge Caregiver contacted prior to discharge? Family to be contacted   Care Conference needed? Not at this time  Barriers to discharge: LTC placement    Chart reviewed. CM aware of discharge order. Pt now agreeable to LTC placement- is aware of need for daily wound care and other nursing needs (O2). Offered choice- she is agreeable to mass referral being sent in [facility] area. Referrals sent via epic today (12 pending). Awaiting response. Delay entered. Will need to complete LTSS once accepted to facility. Will continue to follow.     ELIAS Lisa   Care Manager, [facility]

## 2023-12-02 LAB
ANION GAP SERPL CALC-SCNC: 0 MMOL/L (ref 5–15)
BASOPHILS # BLD: 0 K/UL (ref 0–0.1)
BASOPHILS NFR BLD: 0 % (ref 0–1)
BUN SERPL-MCNC: 10 MG/DL (ref 6–20)
BUN/CREAT SERPL: 9 (ref 12–20)
CALCIUM SERPL-MCNC: 8.4 MG/DL (ref 8.5–10.1)
CHLORIDE SERPL-SCNC: 98 MMOL/L (ref 97–108)
CO2 SERPL-SCNC: 37 MMOL/L (ref 21–32)
CREAT SERPL-MCNC: 1.12 MG/DL (ref 0.55–1.02)
DIFFERENTIAL METHOD BLD: ABNORMAL
EOSINOPHIL # BLD: 0.4 K/UL (ref 0–0.4)
EOSINOPHIL NFR BLD: 5 % (ref 0–7)
ERYTHROCYTE [DISTWIDTH] IN BLOOD BY AUTOMATED COUNT: 18.3 % (ref 11.5–14.5)
GLUCOSE SERPL-MCNC: 115 MG/DL (ref 65–100)
HCT VFR BLD AUTO: 40.3 % (ref 35–47)
HGB BLD-MCNC: 12.3 G/DL (ref 11.5–16)
IMM GRANULOCYTES # BLD AUTO: 0.1 K/UL (ref 0–0.04)
IMM GRANULOCYTES NFR BLD AUTO: 1 % (ref 0–0.5)
LYMPHOCYTES # BLD: 1.6 K/UL (ref 0.8–3.5)
LYMPHOCYTES NFR BLD: 17 % (ref 12–49)
MCH RBC QN AUTO: 31.1 PG (ref 26–34)
MCHC RBC AUTO-ENTMCNC: 30.5 G/DL (ref 30–36.5)
MCV RBC AUTO: 101.8 FL (ref 80–99)
MONOCYTES # BLD: 0.5 K/UL (ref 0–1)
MONOCYTES NFR BLD: 5 % (ref 5–13)
NEUTS SEG # BLD: 6.8 K/UL (ref 1.8–8)
NEUTS SEG NFR BLD: 72 % (ref 32–75)
NRBC # BLD: 0 K/UL (ref 0–0.01)
NRBC BLD-RTO: 0 PER 100 WBC
PLATELET # BLD AUTO: 253 K/UL (ref 150–400)
PMV BLD AUTO: 9.8 FL (ref 8.9–12.9)
POTASSIUM SERPL-SCNC: 3.9 MMOL/L (ref 3.5–5.1)
RBC # BLD AUTO: 3.96 M/UL (ref 3.8–5.2)
SODIUM SERPL-SCNC: 135 MMOL/L (ref 136–145)
WBC # BLD AUTO: 9.5 K/UL (ref 3.6–11)

## 2023-12-02 PROCEDURE — 6370000000 HC RX 637 (ALT 250 FOR IP): Performed by: INTERNAL MEDICINE

## 2023-12-02 PROCEDURE — 94760 N-INVAS EAR/PLS OXIMETRY 1: CPT

## 2023-12-02 PROCEDURE — 2580000003 HC RX 258: Performed by: INTERNAL MEDICINE

## 2023-12-02 PROCEDURE — 6370000000 HC RX 637 (ALT 250 FOR IP)

## 2023-12-02 PROCEDURE — 6360000002 HC RX W HCPCS: Performed by: INTERNAL MEDICINE

## 2023-12-02 PROCEDURE — 1100000003 HC PRIVATE W/ TELEMETRY

## 2023-12-02 PROCEDURE — 85025 COMPLETE CBC W/AUTO DIFF WBC: CPT

## 2023-12-02 PROCEDURE — 2700000000 HC OXYGEN THERAPY PER DAY

## 2023-12-02 PROCEDURE — 36415 COLL VENOUS BLD VENIPUNCTURE: CPT

## 2023-12-02 PROCEDURE — 80048 BASIC METABOLIC PNL TOTAL CA: CPT

## 2023-12-02 RX ORDER — IPRATROPIUM BROMIDE AND ALBUTEROL SULFATE 2.5; .5 MG/3ML; MG/3ML
1 SOLUTION RESPIRATORY (INHALATION) EVERY 6 HOURS PRN
Status: DISCONTINUED | OUTPATIENT
Start: 2023-12-02 | End: 2023-12-05 | Stop reason: HOSPADM

## 2023-12-02 RX ADMIN — ENOXAPARIN SODIUM 30 MG: 100 INJECTION SUBCUTANEOUS at 22:15

## 2023-12-02 RX ADMIN — SODIUM CHLORIDE, PRESERVATIVE FREE 10 ML: 5 INJECTION INTRAVENOUS at 22:00

## 2023-12-02 RX ADMIN — CASTOR OIL AND BALSAM, PERU: 788; 87 OINTMENT TOPICAL at 22:16

## 2023-12-02 RX ADMIN — NYSTATIN 500000 UNITS: 100000 SUSPENSION ORAL at 09:11

## 2023-12-02 RX ADMIN — ENOXAPARIN SODIUM 30 MG: 100 INJECTION SUBCUTANEOUS at 09:11

## 2023-12-02 RX ADMIN — IPRATROPIUM BROMIDE AND ALBUTEROL SULFATE 1 DOSE: .5; 3 SOLUTION RESPIRATORY (INHALATION) at 08:12

## 2023-12-02 RX ADMIN — NYSTATIN 500000 UNITS: 100000 SUSPENSION ORAL at 22:14

## 2023-12-02 RX ADMIN — SODIUM CHLORIDE, PRESERVATIVE FREE 10 ML: 5 INJECTION INTRAVENOUS at 09:18

## 2023-12-02 RX ADMIN — METOPROLOL SUCCINATE 25 MG: 25 TABLET, EXTENDED RELEASE ORAL at 09:14

## 2023-12-02 RX ADMIN — PANTOPRAZOLE SODIUM 40 MG: 40 TABLET, DELAYED RELEASE ORAL at 09:14

## 2023-12-02 RX ADMIN — BUMETANIDE 1 MG: 1 TABLET ORAL at 09:14

## 2023-12-02 RX ADMIN — MICONAZOLE NITRATE: 2 POWDER TOPICAL at 22:16

## 2023-12-02 RX ADMIN — LEVOTHYROXINE SODIUM 75 MCG: 0.07 TABLET ORAL at 06:02

## 2023-12-02 RX ADMIN — DOCUSATE SODIUM 50 MG AND SENNOSIDES 8.6 MG 1 TABLET: 8.6; 5 TABLET, FILM COATED ORAL at 09:14

## 2023-12-02 RX ADMIN — CASTOR OIL AND BALSAM, PERU: 788; 87 OINTMENT TOPICAL at 09:18

## 2023-12-02 RX ADMIN — MICONAZOLE NITRATE: 2 POWDER TOPICAL at 09:19

## 2023-12-02 RX ADMIN — GABAPENTIN 300 MG: 300 CAPSULE ORAL at 09:14

## 2023-12-02 RX ADMIN — GABAPENTIN 300 MG: 300 CAPSULE ORAL at 22:14

## 2023-12-02 RX ADMIN — ZINC OXIDE: 400 PASTE TOPICAL at 09:19

## 2023-12-02 RX ADMIN — SULFAMETHOXAZOLE AND TRIMETHOPRIM 2 TABLET: 400; 80 TABLET ORAL at 09:11

## 2023-12-02 RX ADMIN — BUMETANIDE 1 MG: 1 TABLET ORAL at 22:14

## 2023-12-02 RX ADMIN — GABAPENTIN 300 MG: 300 CAPSULE ORAL at 13:32

## 2023-12-02 RX ADMIN — NYSTATIN 500000 UNITS: 100000 SUSPENSION ORAL at 13:32

## 2023-12-03 LAB
ANION GAP SERPL CALC-SCNC: 1 MMOL/L (ref 5–15)
BASOPHILS # BLD: 0 K/UL (ref 0–0.1)
BASOPHILS NFR BLD: 0 % (ref 0–1)
BUN SERPL-MCNC: 10 MG/DL (ref 6–20)
BUN/CREAT SERPL: 9 (ref 12–20)
CALCIUM SERPL-MCNC: 8.5 MG/DL (ref 8.5–10.1)
CHLORIDE SERPL-SCNC: 101 MMOL/L (ref 97–108)
CO2 SERPL-SCNC: 33 MMOL/L (ref 21–32)
CREAT SERPL-MCNC: 1.11 MG/DL (ref 0.55–1.02)
DIFFERENTIAL METHOD BLD: ABNORMAL
EOSINOPHIL # BLD: 0.3 K/UL (ref 0–0.4)
EOSINOPHIL NFR BLD: 4 % (ref 0–7)
ERYTHROCYTE [DISTWIDTH] IN BLOOD BY AUTOMATED COUNT: 18.4 % (ref 11.5–14.5)
GLUCOSE SERPL-MCNC: 94 MG/DL (ref 65–100)
HCT VFR BLD AUTO: 40.2 % (ref 35–47)
HGB BLD-MCNC: 12.2 G/DL (ref 11.5–16)
IMM GRANULOCYTES # BLD AUTO: 0.1 K/UL (ref 0–0.04)
IMM GRANULOCYTES NFR BLD AUTO: 1 % (ref 0–0.5)
LYMPHOCYTES # BLD: 1.4 K/UL (ref 0.8–3.5)
LYMPHOCYTES NFR BLD: 18 % (ref 12–49)
MCH RBC QN AUTO: 31.4 PG (ref 26–34)
MCHC RBC AUTO-ENTMCNC: 30.3 G/DL (ref 30–36.5)
MCV RBC AUTO: 103.3 FL (ref 80–99)
MONOCYTES # BLD: 0.4 K/UL (ref 0–1)
MONOCYTES NFR BLD: 5 % (ref 5–13)
NEUTS SEG # BLD: 5.5 K/UL (ref 1.8–8)
NEUTS SEG NFR BLD: 72 % (ref 32–75)
NRBC # BLD: 0 K/UL (ref 0–0.01)
NRBC BLD-RTO: 0 PER 100 WBC
PLATELET # BLD AUTO: 237 K/UL (ref 150–400)
PMV BLD AUTO: 9.9 FL (ref 8.9–12.9)
POTASSIUM SERPL-SCNC: 4.3 MMOL/L (ref 3.5–5.1)
RBC # BLD AUTO: 3.89 M/UL (ref 3.8–5.2)
SODIUM SERPL-SCNC: 135 MMOL/L (ref 136–145)
WBC # BLD AUTO: 7.6 K/UL (ref 3.6–11)

## 2023-12-03 PROCEDURE — 85025 COMPLETE CBC W/AUTO DIFF WBC: CPT

## 2023-12-03 PROCEDURE — 6370000000 HC RX 637 (ALT 250 FOR IP): Performed by: INTERNAL MEDICINE

## 2023-12-03 PROCEDURE — 1100000003 HC PRIVATE W/ TELEMETRY

## 2023-12-03 PROCEDURE — 6370000000 HC RX 637 (ALT 250 FOR IP)

## 2023-12-03 PROCEDURE — 36415 COLL VENOUS BLD VENIPUNCTURE: CPT

## 2023-12-03 PROCEDURE — 6360000002 HC RX W HCPCS: Performed by: INTERNAL MEDICINE

## 2023-12-03 PROCEDURE — 80048 BASIC METABOLIC PNL TOTAL CA: CPT

## 2023-12-03 PROCEDURE — 2580000003 HC RX 258: Performed by: INTERNAL MEDICINE

## 2023-12-03 RX ADMIN — DOCUSATE SODIUM 50 MG AND SENNOSIDES 8.6 MG 1 TABLET: 8.6; 5 TABLET, FILM COATED ORAL at 09:10

## 2023-12-03 RX ADMIN — NYSTATIN 500000 UNITS: 100000 SUSPENSION ORAL at 14:13

## 2023-12-03 RX ADMIN — LEVOTHYROXINE SODIUM 75 MCG: 0.07 TABLET ORAL at 06:26

## 2023-12-03 RX ADMIN — BUMETANIDE 1 MG: 1 TABLET ORAL at 20:33

## 2023-12-03 RX ADMIN — ENOXAPARIN SODIUM 30 MG: 100 INJECTION SUBCUTANEOUS at 09:11

## 2023-12-03 RX ADMIN — GABAPENTIN 300 MG: 300 CAPSULE ORAL at 14:12

## 2023-12-03 RX ADMIN — GABAPENTIN 300 MG: 300 CAPSULE ORAL at 09:10

## 2023-12-03 RX ADMIN — SODIUM CHLORIDE, PRESERVATIVE FREE 10 ML: 5 INJECTION INTRAVENOUS at 09:11

## 2023-12-03 RX ADMIN — MICONAZOLE NITRATE: 2 POWDER TOPICAL at 13:13

## 2023-12-03 RX ADMIN — NYSTATIN 500000 UNITS: 100000 SUSPENSION ORAL at 09:11

## 2023-12-03 RX ADMIN — NYSTATIN 500000 UNITS: 100000 SUSPENSION ORAL at 20:33

## 2023-12-03 RX ADMIN — NYSTATIN 500000 UNITS: 100000 SUSPENSION ORAL at 18:18

## 2023-12-03 RX ADMIN — BUMETANIDE 1 MG: 1 TABLET ORAL at 09:10

## 2023-12-03 RX ADMIN — CASTOR OIL AND BALSAM, PERU: 788; 87 OINTMENT TOPICAL at 20:36

## 2023-12-03 RX ADMIN — GABAPENTIN 300 MG: 300 CAPSULE ORAL at 20:33

## 2023-12-03 RX ADMIN — ENOXAPARIN SODIUM 30 MG: 100 INJECTION SUBCUTANEOUS at 20:34

## 2023-12-03 RX ADMIN — METOPROLOL SUCCINATE 25 MG: 25 TABLET, EXTENDED RELEASE ORAL at 09:10

## 2023-12-03 RX ADMIN — PANTOPRAZOLE SODIUM 40 MG: 40 TABLET, DELAYED RELEASE ORAL at 09:10

## 2023-12-03 RX ADMIN — CASTOR OIL AND BALSAM, PERU: 788; 87 OINTMENT TOPICAL at 13:13

## 2023-12-03 RX ADMIN — SODIUM CHLORIDE, PRESERVATIVE FREE 10 ML: 5 INJECTION INTRAVENOUS at 20:34

## 2023-12-03 RX ADMIN — MICONAZOLE NITRATE: 2 POWDER TOPICAL at 20:36

## 2023-12-04 PROCEDURE — 97530 THERAPEUTIC ACTIVITIES: CPT

## 2023-12-04 PROCEDURE — 97535 SELF CARE MNGMENT TRAINING: CPT

## 2023-12-04 PROCEDURE — 6360000002 HC RX W HCPCS: Performed by: INTERNAL MEDICINE

## 2023-12-04 PROCEDURE — 2700000000 HC OXYGEN THERAPY PER DAY

## 2023-12-04 PROCEDURE — 2580000003 HC RX 258: Performed by: INTERNAL MEDICINE

## 2023-12-04 PROCEDURE — 1100000003 HC PRIVATE W/ TELEMETRY

## 2023-12-04 PROCEDURE — 6370000000 HC RX 637 (ALT 250 FOR IP)

## 2023-12-04 PROCEDURE — 6370000000 HC RX 637 (ALT 250 FOR IP): Performed by: INTERNAL MEDICINE

## 2023-12-04 RX ADMIN — GABAPENTIN 300 MG: 300 CAPSULE ORAL at 09:49

## 2023-12-04 RX ADMIN — DOCUSATE SODIUM 50 MG AND SENNOSIDES 8.6 MG 1 TABLET: 8.6; 5 TABLET, FILM COATED ORAL at 09:50

## 2023-12-04 RX ADMIN — BUMETANIDE 1 MG: 1 TABLET ORAL at 20:59

## 2023-12-04 RX ADMIN — ENOXAPARIN SODIUM 30 MG: 100 INJECTION SUBCUTANEOUS at 09:50

## 2023-12-04 RX ADMIN — PANTOPRAZOLE SODIUM 40 MG: 40 TABLET, DELAYED RELEASE ORAL at 09:50

## 2023-12-04 RX ADMIN — SODIUM CHLORIDE, PRESERVATIVE FREE 10 ML: 5 INJECTION INTRAVENOUS at 20:59

## 2023-12-04 RX ADMIN — GABAPENTIN 300 MG: 300 CAPSULE ORAL at 20:59

## 2023-12-04 RX ADMIN — CASTOR OIL AND BALSAM, PERU: 788; 87 OINTMENT TOPICAL at 21:00

## 2023-12-04 RX ADMIN — BUMETANIDE 1 MG: 1 TABLET ORAL at 09:50

## 2023-12-04 RX ADMIN — MICONAZOLE NITRATE: 2 POWDER TOPICAL at 12:56

## 2023-12-04 RX ADMIN — LEVOTHYROXINE SODIUM 75 MCG: 0.07 TABLET ORAL at 06:00

## 2023-12-04 RX ADMIN — NYSTATIN 500000 UNITS: 100000 SUSPENSION ORAL at 13:55

## 2023-12-04 RX ADMIN — NYSTATIN 500000 UNITS: 100000 SUSPENSION ORAL at 09:50

## 2023-12-04 RX ADMIN — NYSTATIN 500000 UNITS: 100000 SUSPENSION ORAL at 17:23

## 2023-12-04 RX ADMIN — SODIUM CHLORIDE, PRESERVATIVE FREE 10 ML: 5 INJECTION INTRAVENOUS at 09:51

## 2023-12-04 RX ADMIN — MICONAZOLE NITRATE: 2 POWDER TOPICAL at 21:00

## 2023-12-04 RX ADMIN — GABAPENTIN 300 MG: 300 CAPSULE ORAL at 13:55

## 2023-12-04 RX ADMIN — CASTOR OIL AND BALSAM, PERU: 788; 87 OINTMENT TOPICAL at 12:56

## 2023-12-04 RX ADMIN — ENOXAPARIN SODIUM 30 MG: 100 INJECTION SUBCUTANEOUS at 20:59

## 2023-12-04 RX ADMIN — NYSTATIN 500000 UNITS: 100000 SUSPENSION ORAL at 20:59

## 2023-12-04 RX ADMIN — METOPROLOL SUCCINATE 25 MG: 25 TABLET, EXTENDED RELEASE ORAL at 09:50

## 2023-12-04 NOTE — CARE COORDINATION
Transition of Care Plan:    RUR: 13%  Prior Level of Functioning: needs assistance, mother was caregiver at home   Disposition: LTC placement- [facility] ([facility] and Rehab   If SNF or IPR: Date FOC offered: 12/1/23  Date FOC received: 12/1/23  Accepting facility: [facility]) and Rehab  Date authorization started with reference number: no auth, LTC   Date authorization received and expires: N/A  Follow up appointments: deferred to facility   DME needed: available at facility   Transportation at discharge: medical transport [facility]  IM/[facility] Medicare/ letter given: N/A  Is patient a [place] and connected with VA? no   If yes, was [employer] transfer form completed and VA notified? Caregiver Contact: Christiano Basilio (mother)  Discharge Caregiver contacted prior to discharge? To be contacted   Care Conference needed? no  Barriers to discharge:  LTC placement     Chart reviewed. CM aware of discharge order. CM sent additional LTC referrals this AM. Pt accepted to the following facilities for LTC placement: [facility] and [facility], [facility], [facility]. Informed pt of the above. Pt has chosen [address] Per facility, they had to order pt a bariatric bed. If delivered by tomorrow, pt can admit to facility tomorrow. Last LTSS completed in 2/2022. CM will complete new LTSS prior to discharge and submit copy to accepting facility. Pt will require medical transport at time of d/c. Delay entered. Will continue to follow.     Sita Zaragoza MSW   Care Manager, [facility]

## 2023-12-05 VITALS
TEMPERATURE: 97.7 F | WEIGHT: 293 LBS | RESPIRATION RATE: 16 BRPM | BODY MASS INDEX: 53.92 KG/M2 | HEART RATE: 80 BPM | HEIGHT: 62 IN | SYSTOLIC BLOOD PRESSURE: 102 MMHG | DIASTOLIC BLOOD PRESSURE: 67 MMHG | OXYGEN SATURATION: 96 %

## 2023-12-05 PROCEDURE — 6370000000 HC RX 637 (ALT 250 FOR IP): Performed by: INTERNAL MEDICINE

## 2023-12-05 PROCEDURE — 2700000000 HC OXYGEN THERAPY PER DAY

## 2023-12-05 PROCEDURE — 94760 N-INVAS EAR/PLS OXIMETRY 1: CPT

## 2023-12-05 PROCEDURE — 2580000003 HC RX 258: Performed by: INTERNAL MEDICINE

## 2023-12-05 PROCEDURE — 6370000000 HC RX 637 (ALT 250 FOR IP)

## 2023-12-05 PROCEDURE — 6360000002 HC RX W HCPCS: Performed by: INTERNAL MEDICINE

## 2023-12-05 RX ADMIN — NYSTATIN 500000 UNITS: 100000 SUSPENSION ORAL at 08:59

## 2023-12-05 RX ADMIN — LEVOTHYROXINE SODIUM 75 MCG: 0.07 TABLET ORAL at 06:24

## 2023-12-05 RX ADMIN — DOCUSATE SODIUM 50 MG AND SENNOSIDES 8.6 MG 1 TABLET: 8.6; 5 TABLET, FILM COATED ORAL at 08:59

## 2023-12-05 RX ADMIN — GABAPENTIN 300 MG: 300 CAPSULE ORAL at 08:59

## 2023-12-05 RX ADMIN — NYSTATIN 500000 UNITS: 100000 SUSPENSION ORAL at 15:38

## 2023-12-05 RX ADMIN — METOPROLOL SUCCINATE 25 MG: 25 TABLET, EXTENDED RELEASE ORAL at 08:59

## 2023-12-05 RX ADMIN — GABAPENTIN 300 MG: 300 CAPSULE ORAL at 15:39

## 2023-12-05 RX ADMIN — PANTOPRAZOLE SODIUM 40 MG: 40 TABLET, DELAYED RELEASE ORAL at 08:59

## 2023-12-05 RX ADMIN — BUMETANIDE 1 MG: 1 TABLET ORAL at 08:59

## 2023-12-05 RX ADMIN — ENOXAPARIN SODIUM 30 MG: 100 INJECTION SUBCUTANEOUS at 08:59

## 2023-12-05 RX ADMIN — MICONAZOLE NITRATE: 2 POWDER TOPICAL at 09:51

## 2023-12-05 RX ADMIN — CASTOR OIL AND BALSAM, PERU: 788; 87 OINTMENT TOPICAL at 09:50

## 2023-12-05 RX ADMIN — SODIUM CHLORIDE, PRESERVATIVE FREE 10 ML: 5 INJECTION INTRAVENOUS at 08:59

## 2023-12-05 ASSESSMENT — PAIN SCALES - GENERAL: PAINLEVEL_OUTOF10: 0

## 2023-12-05 NOTE — CARE COORDINATION
Transition of Care Plan:     RUR: 13%  Prior Level of Functioning: needs assistance, mother was caregiver at home   Disposition: LTC placement- [facility] ([facility]) and Rehab   If SNF or IPR: Date FOC offered: 23  Date FOC received: 23  Accepting facility: [facility]) and Rehab  Date authorization started with reference number: no auth, LTC   Date authorization received and expires: N/A  Follow up appointments: deferred to facility   DME needed: available at facility   Transportation at discharge: [facility], ETA 5PM  IM/IMM Medicare/ letter given: N/A  Is patient a [place] and connected with VA? no              If yes, was [employer] transfer form completed and VA notified? Caregiver Contact: Fany Juan (mother)  Discharge Caregiver contacted prior to discharge? pt to contact   Care Conference needed? no  Barriers to discharge:  none       23 1624   Services At/After Discharge   Transition of Care Consult (CM Consult) Discharge Planning   Services At/After Discharge Transport; Long term care   [address] Provided? No   Mode of Transport at Discharge [facility] Transport Time of Discharge 1700   Confirm Follow Up Transport Wheelchair [facility]   Condition of Participation: Discharge Planning   The Plan for Transition of Care is related to the following treatment goals: LTC placement   The Patient and/or Patient Representative was provided with a Choice of Provider? Patient   The Patient and/Or Patient Representative agree with the Discharge Plan? Yes   Freedom of Choice list was provided with basic dialogue that supports the patient's individualized plan of care/goals, treatment preferences, and shares the quality data associated with the providers? Yes       CM aware of d/c order. Pt discharging to [facility]) and Rehab for LTC placement. RN to call report to [phone]. CM spoke with liaison, Majel Apgar, to confirm pt can admit today as planned.  ([phone]) [facility]

## 2023-12-05 NOTE — PLAN OF CARE
Problem: Discharge Planning  Goal: Discharge to home or other facility with appropriate resources  12/4/2023 0951 by Marcus Spivey RN  Outcome: Progressing  12/3/2023 2229 by Masha Livingston RN  Outcome: Progressing     Problem: Pain  Goal: Verbalizes/displays adequate comfort level or baseline comfort level  12/4/2023 0951 by Marcus Spivey RN  Outcome: Progressing  12/3/2023 2229 by Masha Livingston RN  Outcome: Progressing     Problem: Skin/Tissue Integrity  Goal: Absence of new skin breakdown  Description: 1. Monitor for areas of redness and/or skin breakdown  2. Assess vascular access sites hourly  3. Every 4-6 hours minimum:  Change oxygen saturation probe site  4. Every 4-6 hours:  If on nasal continuous positive airway pressure, respiratory therapy assess nares and determine need for appliance change or resting period.   12/4/2023 0951 by Marcus Spivey RN  Outcome: Progressing  12/3/2023 2229 by Masha Livingston RN  Outcome: Progressing     Problem: Safety - Adult  Goal: Free from fall injury  12/4/2023 0951 by Marcus Spivey RN  Outcome: Progressing  12/3/2023 2229 by Masha Livingston RN  Outcome: Progressing
Problem: Discharge Planning  Goal: Discharge to home or other facility with appropriate resources  12/5/2023 1732 by Farheen Juárez RN  Outcome: Adequate for Discharge  12/5/2023 0907 by Farheen Juárez RN  Outcome: Progressing     Problem: Pain  Goal: Verbalizes/displays adequate comfort level or baseline comfort level  12/5/2023 1732 by Farheen Juárez RN  Outcome: Adequate for Discharge  12/5/2023 0907 by Farheen Juárez RN  Outcome: Progressing     Problem: Skin/Tissue Integrity  Goal: Absence of new skin breakdown  Description: 1. Monitor for areas of redness and/or skin breakdown  2. Assess vascular access sites hourly  3. Every 4-6 hours minimum:  Change oxygen saturation probe site  4. Every 4-6 hours:  If on nasal continuous positive airway pressure, respiratory therapy assess nares and determine need for appliance change or resting period.   12/5/2023 1732 by Farheen Juárez RN  Outcome: Adequate for Discharge  12/5/2023 0907 by Farheen Juárez RN  Outcome: Progressing     Problem: Safety - Adult  Goal: Free from fall injury  12/5/2023 1732 by Farheen Juárez RN  Outcome: Adequate for Discharge  12/5/2023 0907 by Farheen Juárez RN  Outcome: Progressing
Problem: Discharge Planning  Goal: Discharge to home or other facility with appropriate resources  Outcome: Progressing     Problem: Pain  Goal: Verbalizes/displays adequate comfort level or baseline comfort level  Outcome: Progressing     Problem: Skin/Tissue Integrity  Goal: Absence of new skin breakdown  Description: 1. Monitor for areas of redness and/or skin breakdown  2. Assess vascular access sites hourly  3. Every 4-6 hours minimum:  Change oxygen saturation probe site  4. Every 4-6 hours:  If on nasal continuous positive airway pressure, respiratory therapy assess nares and determine need for appliance change or resting period.   Outcome: Progressing     Problem: Safety - Adult  Goal: Free from fall injury  Outcome: Progressing
Problem: Occupational Therapy - Adult  Goal: By Discharge: Performs self-care activities at highest level of function for planned discharge setting. See evaluation for individualized goals. Description: FUNCTIONAL STATUS PRIOR TO ADMISSION:    Receives Help From: Family, ADL Assistance: Needs assistance, Bath: Maximum assistance, Dressing: Moderate assistance, Grooming: Minimal assistance, Feeding: Independent, Toileting: Needs assistance, Homemaking Assistance: Needs assistance, Ambulation Assistance: Non-ambulatory, Transfer Assistance: Needs assistance, Active : No     HOME SUPPORT: Patient lived mother who is 68years old that provided assistance. Occupational Therapy Goals:  Initiated 11/28/2023  1. Patient will perform grooming with Set-up seated EOB within 7 day(s). 2.  Patient will perform upper body dressing with Supervision within 7 day(s). 3.  Patient will perform lower body dressing with Maximal Assist within 7 day(s). 4.  Patient will perform toilet transfers with Minimal Assist using RW within 7 day(s). 5.  Patient will perform all aspects of toileting with Moderate Assist within 7 day(s). Outcome: Not Progressing     OCCUPATIONAL THERAPY TREATMENT  Patient: Jacobo Boateng (54 y.o. female)  Date: 12/4/2023  Primary Diagnosis: Sinus tachycardia [R00.0]  Cellulitis [L03.90]  Cellulitis of left lower extremity [L03.116]  Bilateral lower extremity edema [R60.0]       Precautions: Fall Risk, Up as Tolerated, Other (comment) (L leg wound and cellulitis)                Chart, occupational therapy assessment, plan of care, and goals were reviewed. ASSESSMENT  Patient continues to benefit from skilled OT services and is not progressing towards goals. Upon arrival of therapy, O2 was checked, SpO2 at 85% while supine. Declining O2 at this time. SpO2 dropped to 80% after transfers at end of session, pt continued to think she did not need O2.  With max encouragement and education, pt agreed
Problem: Pain  Goal: Verbalizes/displays adequate comfort level or baseline comfort level  Outcome: Progressing     Problem: Skin/Tissue Integrity  Goal: Absence of new skin breakdown  Description: 1. Monitor for areas of redness and/or skin breakdown  2. Assess vascular access sites hourly  3. Every 4-6 hours minimum:  Change oxygen saturation probe site  4. Every 4-6 hours:  If on nasal continuous positive airway pressure, respiratory therapy assess nares and determine need for appliance change or resting period.   Outcome: Progressing     Problem: Safety - Adult  Goal: Free from fall injury  Outcome: Progressing
Problem: Physical Therapy - Adult  Goal: By Discharge: Performs mobility at highest level of function for planned discharge setting. See evaluation for individualized goals. Description: FUNCTIONAL STATUS PRIOR TO ADMISSION: The patient  required minimal assistance for basic and instrumental ADLs. and The patient was functional at the wheelchair level and required minimal assistancefor transfers to the chair. Has not ambulated for a few months. HOME SUPPORT PRIOR TO ADMISSION: The patient lived with 67year old mother and required minimal assistance for transfers out of bed to wheelchair. 1 story home with ramp. Physical Therapy Goals  Initiated 11/27/2023  1. Patient will move from supine to sit and sit to supine, scoot up and down, and roll side to side in bed with modified independence within 7 day(s). 2.  Patient will perform sit to stand with minimal assistance within 7 day(s). 3.  Patient will transfer from bed to chair and chair to bed with minimal assistance using the least restrictive device within 7 day(s). 4.  Patient will ambulate with minimal assistance for 5 feet with bariatric RW within 7 day(s). 5.  Patient will perform 5 sit to stands in < 60 seconds with minimal assistance within 7 day(s). Outcome: Not Progressing/PT eval completed  PHYSICAL THERAPY EVALUATION    Patient: Seb Cherry (32 y.o. female)  Date: 11/27/2023  Primary Diagnosis: Sinus tachycardia [R00.0]  Cellulitis [L03.90]  Cellulitis of left lower extremity [L03.116]  Bilateral lower extremity edema [R60.0]       Precautions: Fall Risk, Up as Tolerated, Other (comment) (L leg wound and cellulitis)                    ASSESSMENT :   DEFICITS/IMPAIRMENTS:   The patient is limited by decreased functional mobility, independence in ADLs, high-level IADLs, ROM, strength, body mechanics, activity tolerance, balance, increased pain levels following admission with LLE cellulitis.      Based on the impairments listed above
Problem: Physical Therapy - Adult  Goal: By Discharge: Performs mobility at highest level of function for planned discharge setting. See evaluation for individualized goals. Description: FUNCTIONAL STATUS PRIOR TO ADMISSION: The patient  required minimal assistance for basic and instrumental ADLs. and The patient was functional at the wheelchair level and required minimal assistancefor transfers to the chair. Has not ambulated for a few months. HOME SUPPORT PRIOR TO ADMISSION: The patient lived with 67year old mother and required minimal assistance for transfers out of bed to wheelchair. 1 story home with ramp. Physical Therapy Goals  Initiated 11/27/2023 - Reassessed 12/4/23: current goals still appropriate. 1.  Patient will move from supine to sit and sit to supine, scoot up and down, and roll side to side in bed with modified independence within 7 day(s). 2.  Patient will perform sit to stand with minimal assistance within 7 day(s). 3.  Patient will transfer from bed to chair and chair to bed with minimal assistance using the least restrictive device within 7 day(s). 4.  Patient will ambulate with minimal assistance for 5 feet with bariatric RW within 7 day(s). 5.  Patient will perform 5 sit to stands in < 60 seconds with minimal assistance within 7 day(s). Outcome: Not Progressing   PHYSICAL THERAPY TREATMENT: WEEKLY REASSESSMENT    Patient: Destini Rivera (37 y.o. female)  Date: 12/4/2023  Primary Diagnosis: Sinus tachycardia [R00.0]  Cellulitis [L03.90]  Cellulitis of left lower extremity [L03.116]  Bilateral lower extremity edema [R60.0]       Precautions: Fall Risk, Up as Tolerated, Other (comment) (L leg cellulitis)                    ASSESSMENT :  Patient continues to benefit from skilled PT services and is not progressing towards goals.  Barriers to reaching goals are generalized weakness, decreased activity tolerance, poor standing balance with poor ability to weight shift to allow
Training  Transfer Training: Yes  Interventions: Manual cues; Safety awareness training;Verbal cues; Tactile cues; Weight shifting training/pressure relief  Sit to Stand: Minimum assistance; Moderate assistance;Assist X2  Stand to Sit: Moderate assistance  Bed to Chair: Minimum assistance; Moderate assistance;Assist X2  Balance:  Balance  Sitting: Impaired  Sitting - Static: Good (unsupported)  Sitting - Dynamic: Fair (occasional)  Standing: Impaired  Standing - Static: Fair;Poor;Constant support  Standing - Dynamic: Poor;Constant support   Ambulation/Gait Training:          Therapeutic Exercises:     EXERCISE   Sets   Reps   Active Active Assist   Passive Self ROM   Comments   Ankle Pumps  10 [x] [] [] []    Quad Sets  10 [x] [] [] []    Hamstring Sets   [] [] [] []    Gluteal Sets  10 [x] [] [] []    Short Arc Quads   [] [] [] []    Heel Slides   [] [] [] []    Straight Leg Raises   [] [] [] []    Hip int rotation  10 [x] [] [] []    Long Arc Quads   [] [] [] []    Marching   [] [] [] []       [] [] [] []      Pain Ratin/10 L leg wounds  Pain Intervention(s):   patient medicated for pain prior to session, elevation, and repositioning    Activity Tolerance:   Fair , requires rest breaks, and desaturates with activity and requires oxygen    After treatment:   Patient left in no apparent distress sitting up in chair and Call bell within reach      COMMUNICATION/EDUCATION:   The patient's plan of care was discussed with: occupational therapist and registered nurse    Patient Education  Education Given To: Patient  Education Provided: Role of Therapy;Plan of Care;Home Exercise Program;Precautions; Fall Prevention Strategies;Transfer Training;Energy Conservation  Education Method: Demonstration;Verbal  Barriers to Learning: None  Education Outcome: Verbalized understanding;Continued education needed;Demonstrated understanding (needs to further improve performance)      Bibiana Hernandez, PT  Minutes: 62
responsiveness of the Barthel Index and Functional Page Measure. Journal of Neurology, Neurosurgery, and Psychiatry, 66(4), 974-683. SEGUNDO KentA, JEFE Randall, & Marva Boudreaux M.A. (2004) Assessment of post-stroke quality of life in cost-effectiveness studies: The usefulness of the Barthel Index and the EuroQoL-5D. Quality of Life Research, 13, 427-43                                                                                                                                                                                                                                 Pain Ratin/10   Pain Intervention(s):   pain is at a level acceptable to the patient    Activity Tolerance:   Fair , requires rest breaks, and observed shortness of breath on exertion, 2L O2 throughout session    After treatment:   Patient left in no apparent distress sitting up in chair, Call bell within reach, and Bed/ chair alarm activated    COMMUNICATION/EDUCATION:   The patient's plan of care was discussed with: physical therapist and registered nurse    Patient Education  Education Given To: Patient  Education Provided: Role of Therapy;Plan of Care;ADL Adaptive Strategies;Transfer Training;Energy Conservation;Orientation; Fall Prevention Strategies  Education Method: Verbal  Barriers to Learning: None  Education Outcome: Verbalized understanding;Continued education needed    Thank you for this referral.  Nayeli Boland OT  Minutes: 40    Occupational Therapy Evaluation Charge Determination   History Examination Decision-Making   LOW Complexity : Brief history review  LOW Complexity: 1-3 Performance deficits relating to physical, cognitive, or psychosocial skills that result in activity limitations and/or participation restrictions LOW Complexity: No comorbidities that affect functional and  no verbal  or physical assist needed to complete eval tasks   Based on the above components, the patient evaluation is

## 2024-02-26 ENCOUNTER — OFFICE VISIT (OUTPATIENT)
Facility: CLINIC | Age: 44
End: 2024-02-26
Payer: MEDICAID
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
## 2024-02-26 DIAGNOSIS — R26.2 INABILITY TO AMBULATE DUE TO MULTIPLE JOINTS: ICD-10-CM

## 2024-02-26 DIAGNOSIS — J96.01 ACUTE HYPOXEMIC RESPIRATORY FAILURE (HCC): Primary | ICD-10-CM

## 2024-02-26 DIAGNOSIS — M54.12 CERVICAL RADICULOPATHY: ICD-10-CM

## 2024-02-26 PROCEDURE — 99309 SBSQ NF CARE MODERATE MDM 30: CPT | Performed by: INTERNAL MEDICINE

## 2024-02-27 NOTE — PROGRESS NOTES
PLACE OF SERVICE:  [facility] [address], [place] [address]     SKILLED VISIT    2/26/2024    Chief Complaint : 30-day recertification  Neuropathic pains  Bilateral lower extremity edema  Sleep Apnea  Morbid Obesity  Debility and Weakness      HPI : Patient is a pleasant 43-year-old  female with significant past medical history of morbid obesity, obstructive sleep apnea, hypothyroidism, arthritis was admitted in the hospital recently for increasing bilateral lower extremity edema as well as left foot cellulitis/wound, she is completing physical therapy/skilled nursing in the  facility, she has ongoing neuropathic pains for which she is requiring gabapentin and reviewed and discussed with patient and renewed the prescription today. As a  baseline she does seems to have evidence of some cognitive impairment. In the  hospital she was evaluated for speech problem and seen by neurology and CT head was negative.  pleasant. She has slow reaction response.  Denies chest pain shortness of breath denies headache, nausea or vomiting.  No fall.  Discussed progress with RN.  States pain wheelchair-bound, every few days she does go with her boyfriend for short leave of absence.  No fever chills no urinary complaints.    ROS:  Pertinent ROS is covered under the history of present illness rest of the 14 system review of systems unremarkable mobility.    Past medical history:  Arthritis  Chronic back pain  Peripheral neuropathy  Presumed obesity hypoventilation syndrome  Chronic debility and limited ambulation  Cervical spinal stenosis  GERD  Past surgical history:  Left ankle surgery  C-spine surgery  Family history:  Patient denies any family history of hypertension heart disease diabetes mellitus  heart failure.  Allergies:  Cranberry preps/capsules    Medication:  Reviewed all meds in EMR      CODE STATUS:  Full code      Psychosocial history:  Patient lived with her mother she is

## 2024-02-29 ENCOUNTER — OFFICE VISIT (OUTPATIENT)
Facility: CLINIC | Age: 44
End: 2024-02-29

## 2024-02-29 DIAGNOSIS — J96.01 ACUTE HYPOXEMIC RESPIRATORY FAILURE (HCC): Primary | ICD-10-CM

## 2024-02-29 DIAGNOSIS — M54.12 CERVICAL RADICULOPATHY: ICD-10-CM
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
## 2024-02-29 NOTE — PROGRESS NOTES
PLACE OF SERVICE:  [facility] [address], [place] [address]     SKILLED VISIT      2/29/2024    Chief Complaint :   Asked to see the patient for the concern for pregnancy test     Neuropathic pains  Bilateral lower extremity edema  Sleep Apnea  Morbid Obesity  Debility and Weakness      HPI : Patient is a pleasant 43-year-old  female with significant past medical history of morbid obesity, obstructive sleep apnea, hypothyroidism, arthritis was admitted in the hospital recently for increasing bilateral lower extremity edema as well as left foot cellulitis/wound, she is completing physical therapy/skilled nursing in the  facility, she is wheelchair to bedbound, she has ongoing neuropathic pains for which she is requiring gabapentin  As a  baseline she does seems to have evidence of some cognitive impairment. In the  hospital she was evaluated for speech problem and seen by neurology and CT head was negative.  She stays very pleasant. She has slow reaction response.  Denies chest pain shortness of breath denies headache, nausea or vomiting.  No fall.  Discussed progress with RN.  States pain wheelchair-bound, every few days she does go with her boyfriend for short leave of absence.  No fever chills no urinary complaints.  After her last interaction with her boyfriend 2 weeks ago.  now she is concerned that she should be checked for pregnancy.  Her last menstruation period was 10 years ago, she does not have any vaginal discomfort or discharge.  Denies hematuria.  Nausea vomiting abdominal pain.    ROS:  Pertinent ROS is covered under the history of present illness rest of the 14 system review of systems unremarkable mobility.    Past medical history:  Arthritis  Chronic back pain  Peripheral neuropathy  Presumed obesity hypoventilation syndrome  Chronic debility and limited ambulation  Cervical spinal stenosis  GERD  Past surgical history:  Left ankle surgery  C-spine

## (undated) DEVICE — SURGIFOAM SPNG SZ 100

## (undated) DEVICE — SUTURE MCRYL SZ 4-0 L27IN ABSRB UD L19MM PS-2 1/2 CIR PRIM Y426H

## (undated) DEVICE — TOOL 14MH30 LEGEND 14CM 3MM: Brand: MIDAS REX ™

## (undated) DEVICE — SUTURE VCRL SZ 2-0 L18IN ABSRB UD L26MM CP-2 1/2 CIR REV J762D

## (undated) DEVICE — LAMINECTOMY-MRMC: Brand: MEDLINE INDUSTRIES, INC.

## (undated) DEVICE — SOLUTION IRRIG 1000ML 0.9% SOD CHL USP POUR PLAS BTL

## (undated) DEVICE — TOTAL TRAY, 16FR 10ML SIL FOLEY, URN: Brand: MEDLINE

## (undated) DEVICE — AEGIS 1" DISK 4MM HOLE, PEEL OPEN: Brand: MEDLINE

## (undated) DEVICE — DERMABOND SKIN ADH 0.7ML -- DERMABOND ADVANCED 12/BX

## (undated) DEVICE — SPONGE GZ W4XL4IN COT 12 PLY TYP VII WVN C FLD DSGN

## (undated) DEVICE — SCREW EXT FIX L14MM FOR DISTRCTN

## (undated) DEVICE — MAGNETIC INSTR DRAPE 20X16: Brand: MEDLINE INDUSTRIES, INC.

## (undated) DEVICE — MARKER,SKIN,WI/RULER AND LABELS: Brand: MEDLINE

## (undated) DEVICE — SOL TOP ALC ISO 70% 4OZ --

## (undated) DEVICE — DRAIN KT WND 10FR RND 400ML --

## (undated) DEVICE — SOLUTION IRRIG 1000ML STRL H2O USP PLAS POUR BTL

## (undated) DEVICE — INSULATED BLADE ELECTRODE: Brand: EDGE

## (undated) DEVICE — SPONGE: SPECIALTY PEANUT XR 100/CS: Brand: MEDICAL ACTION INDUSTRIES

## (undated) DEVICE — FLOSEAL HEMOSTATIC MATRIX, 5 ML: Brand: FLOSEAL

## (undated) DEVICE — DRAPE,LAPAROTOMY,PED,STERILE: Brand: MEDLINE

## (undated) DEVICE — INTENDED FOR TISSUE SEPARATION, AND OTHER PROCEDURES THAT REQUIRE A SHARP SURGICAL BLADE TO PUNCTURE OR CUT.: Brand: BARD-PARKER ® CARBON RIB-BACK BLADES

## (undated) DEVICE — BIPOLAR IRRIGATOR INTEGRATED TUBING AND BIPOLAR CORD SET, DISPOSABLE

## (undated) DEVICE — C-ARM: Brand: UNBRANDED

## (undated) DEVICE — SUTURE ETHLN SZ 2-0 L18IN NONABSORBABLE BLK L19MM PS-2 PRIM 593H

## (undated) DEVICE — DRAPE MICSCP W46XL120IN POLY DRAWSTRAP W STEREO OBS TB AND

## (undated) DEVICE — GLOVE SURG SZ 65 L12IN FNGR THK94MIL STD WHT LTX FREE

## (undated) DEVICE — XTW CERVICAL COLLAR: Brand: DEROYAL

## (undated) DEVICE — SOLUTION SURG PREP 26 CC PURPREP